# Patient Record
Sex: FEMALE | Race: WHITE | NOT HISPANIC OR LATINO | Employment: FULL TIME | URBAN - METROPOLITAN AREA
[De-identification: names, ages, dates, MRNs, and addresses within clinical notes are randomized per-mention and may not be internally consistent; named-entity substitution may affect disease eponyms.]

---

## 2014-07-11 LAB
HPV ABSTRACT: NORMAL
PAP SMEAR - HIM PATIENT REPORTED: NORMAL

## 2015-07-20 LAB — PAP-ABSTRACT: NORMAL

## 2017-01-04 ENCOUNTER — TRANSFERRED RECORDS (OUTPATIENT)
Dept: HEALTH INFORMATION MANAGEMENT | Facility: CLINIC | Age: 22
End: 2017-01-04

## 2017-01-04 LAB
HEP C HIM: NORMAL
PAP-ABSTRACT: NORMAL

## 2018-01-18 LAB — PHQ9 SCORE: 0

## 2018-01-22 ENCOUNTER — TRANSFERRED RECORDS (OUTPATIENT)
Dept: HEALTH INFORMATION MANAGEMENT | Facility: CLINIC | Age: 23
End: 2018-01-22

## 2019-01-23 ENCOUNTER — OFFICE VISIT (OUTPATIENT)
Dept: OBGYN | Facility: CLINIC | Age: 24
End: 2019-01-23
Payer: COMMERCIAL

## 2019-01-23 VITALS
DIASTOLIC BLOOD PRESSURE: 60 MMHG | WEIGHT: 128 LBS | HEART RATE: 59 BPM | SYSTOLIC BLOOD PRESSURE: 96 MMHG | TEMPERATURE: 98.6 F

## 2019-01-23 DIAGNOSIS — Z00.00 ANNUAL PHYSICAL EXAM: Primary | ICD-10-CM

## 2019-01-23 PROCEDURE — 99385 PREV VISIT NEW AGE 18-39: CPT | Performed by: OBSTETRICS & GYNECOLOGY

## 2019-01-23 PROCEDURE — G0145 SCR C/V CYTO,THINLAYER,RESCR: HCPCS | Performed by: OBSTETRICS & GYNECOLOGY

## 2019-01-23 SDOH — HEALTH STABILITY: MENTAL HEALTH: HOW MANY STANDARD DRINKS CONTAINING ALCOHOL DO YOU HAVE ON A TYPICAL DAY?: 1 OR 2

## 2019-01-23 SDOH — HEALTH STABILITY: MENTAL HEALTH: HOW OFTEN DO YOU HAVE A DRINK CONTAINING ALCOHOL?: MONTHLY OR LESS

## 2019-01-23 SDOH — HEALTH STABILITY: MENTAL HEALTH: HOW OFTEN DO YOU HAVE 6 OR MORE DRINKS ON ONE OCCASION?: NEVER

## 2019-01-23 NOTE — NURSING NOTE
Chief Complaint   Patient presents with     Physical       Initial BP 96/60 (BP Location: Left arm, Patient Position: Sitting, Cuff Size: Adult Regular)   Pulse 59   Temp 98.6  F (37  C) (Oral)   Wt 58.1 kg (128 lb)  There is no height or weight on file to calculate BMI.  BP completed using cuff size: regular    Questioned patient about current smoking habits.  Pt. has never smoked.          The following HM Due: Pap Smear    The following patient reported/Care Every where data was sent to:  P ABSTRACT QUALITY INITIATIVES [08606]    ANJELICA Burnette

## 2019-01-23 NOTE — LETTER
January 31, 2019      Melody Cash  [address], [place] [address]    Dear MsPrabhakarShailesh,      I am happy to inform you that your recent cervical cancer screening test (PAP smear) was normal.      Preventative screenings such as this help to ensure your health for years to come. You should repeat a pap smear in 3 years, unless otherwise directed.      You will still need to return to the clinic every year for your annual exam and other preventive tests.     If you have additional questions regarding this result, please call our registered nurse, Iraida at [phone].      Sincerely,      Aarti Flowers MD/[facility]

## 2019-01-23 NOTE — PROGRESS NOTES
Melody is a 23 year old  female who presents for annual exam.     Menses are irregular paitient have IUD lasting 0 days.  Menses flow: normal and 0.  No LMP recorded.. Using IUD for contraception.  She is not currently considering pregnancy.  Besides routine health maintenance, she has no other health concerns today .  Mirena IUD placed 2015.  Aware it's due to be replaced next year.    Got Gardasil x3  in high school.  GYNECOLOGIC HISTORY:  Menarche: 12  Age at first intercourse: 17 Number of lifetime partners: less than 6  Melody is not sexually active with 0male partner(s) and is not currently in monogamous relationship .    History sexually transmitted infections:No STD history  STI testing offered?  Declined  SIDNEY exposure: No  History of abnormal Pap smear: NO - age 21-29 PAP every 3 years recommended  Family history of breast CA: No  Family history of uterine/ovarian CA: No    Family history of colon CA: Yes (Please explain): Maternal Grandfather    HEALTH MAINTENANCE:  Cholesterol: (No results found for: CHOL History of abnormal lipids: No  Mammo: 0 . History of abnormal Mammo: Not applicable, 0.  Regular Self Breast Exams: Yes  Calcium/Vitamin D intake: source:  dietary supplement(s) Adequate? Yes  TSH: (No results found for: TSH )  Pap; (No results found for: PAP )    HISTORY:  Obstetric History       T0      L0     SAB0   TAB0   Ectopic0   Multiple0   Live Births0         No past medical history on file.  Past Surgical History:   Procedure Laterality Date     widsom teeth       Family History   Problem Relation Age of Onset     Dementia Mother      Hypertension Maternal Grandfather      Hyperlipidemia Maternal Grandfather      Blood Disease Maternal Grandfather      Cancer Maternal Grandfather      Colon Cancer Maternal Grandfather      Cancer Paternal Grandmother      Coronary Artery Disease Other         PGFA     Diabetes Other         PGFA     Social History     Socioeconomic  History     Marital status: Single     Spouse name: None     Number of children: None     Years of education: None     Highest education level: None   Social Needs     Financial resource strain: None     Food insecurity - worry: None     Food insecurity - inability: None     Transportation needs - medical: None     Transportation needs - non-medical: None   Occupational History     None   Tobacco Use     Smoking status: Never Smoker     Smokeless tobacco: Never Used   Substance and Sexual Activity     Alcohol use: Yes     Frequency: Monthly or less     Drinks per session: 1 or 2     Binge frequency: Never     Comment: socially     Drug use: No     Sexual activity: Not Currently     Partners: Male   Other Topics Concern     None   Social History Narrative     None       Current Outpatient Medications:      calcium carbonate-vitamin D (OS-LIN) 500-400 MG-UNIT tablet, Take 1 tablet by mouth daily, Disp: , Rfl:      levonorgestrel (MIRENA) 20 MCG/24HR IUD, 1 each by Intrauterine route once, Disp: , Rfl:    No Known Allergies    Past medical, surgical, social and family history were reviewed and updated in EPIC.    ROS:   C:     NEGATIVE for fever, chills, change in weight  I:       NEGATIVE for worrisome rashes, moles or lesions  E:     NEGATIVE for vision changes or irritation  E/M: NEGATIVE for ear, mouth and throat problems  R:     NEGATIVE for significant cough or SOB  CV:   NEGATIVE for chest pain, palpitations or peripheral edema  GI:     NEGATIVE for nausea, abdominal pain, heartburn, or change in bowel habits  :   NEGATIVE for frequency, dysuria, hematuria, vaginal discharge, or irregular bleeding  M:     NEGATIVE for significant arthralgias or myalgia  N:      NEGATIVE for weakness, dizziness or paresthesias  E:      NEGATIVE for temperature intolerance, skin/hair changes  P:      NEGATIVE for changes in mood or affect.    EXAM:  BP 96/60 (BP Location: Left arm, Patient Position: Sitting, Cuff Size: Adult  Regular)   Pulse 59   Temp 98.6  F (37  C) (Oral)   Wt 58.1 kg (128 lb)    BMI: There is no height or weight on file to calculate BMI.  Constitutional: healthy, alert and no distress  Head: Normocephalic. No masses, lesions, tenderness or abnormalities  Neck: Neck supple. Trachea midline. No adenopathy. Thyroid symmetric, normal size.   Cardiovascular: RRR.   Respiratory: Negative.   Breast: No nodularity, asymmetry or nipple discharge bilaterally.  Gastrointestinal: Abdomen soft, non-tender, non-distended. No masses, organomegaly.  :  Vulva:  No external lesions, normal female hair distribution, no inguinal adenopathy.    Urethra:  Midline, non-tender, well supported, no discharge  Vagina:  Moist, pink, no abnormal discharge, no lesions  Uterus:  Normal size, non-tender, freely mobile  Ovaries:  No masses appreciated, non-tender, mobile  Rectal Exam: deferred  Musculoskeletal: extremities normal  Skin: no suspicious lesions or rashes  Psychiatric: Affect appropriate, cooperative,mentation appears normal.     COUNSELING:   Reviewed preventive health counseling, as reflected in patient instructions       Contraception       Family planning       Safe sex practices/STD prevention   reports that  has never smoked. she has never used smokeless tobacco.    There is no height or weight on file to calculate BMI.   Normal BMI  FRAX Risk Assessment    ASSESSMENT:  23 year old female with satisfactory annual exam  (Z00.00) Annual physical exam  (primary encounter diagnosis)  Comment: normal exam.  Pap updated.  Declines STI screening.    Plan: Pap imaged thin layer screen reflex to HPV if         ASCUS - recommend age 25 - 29            Plan to replace IUD with annual next Jacob.    Aarti Flowers MD

## 2019-01-25 ENCOUNTER — OFFICE VISIT (OUTPATIENT)
Dept: DERMATOLOGY | Facility: CLINIC | Age: 24
End: 2019-01-25
Payer: COMMERCIAL

## 2019-01-25 DIAGNOSIS — D22.9 MULTIPLE BENIGN NEVI: ICD-10-CM

## 2019-01-25 DIAGNOSIS — Z80.8 FAMILY HISTORY OF MELANOMA: ICD-10-CM

## 2019-01-25 DIAGNOSIS — L81.4 SOLAR LENTIGINOSIS: ICD-10-CM

## 2019-01-25 DIAGNOSIS — Z12.83 SKIN CANCER SCREENING: Primary | ICD-10-CM

## 2019-01-25 ASSESSMENT — PAIN SCALES - GENERAL: PAINLEVEL: NO PAIN (0)

## 2019-01-25 NOTE — LETTER
1/25/2019       RE: Melody Cash  [address]  [place] [place] [facility] [address]     Dear Colleague,    Thank you for referring your patient, Melody Cash, to the [facility] DERMATOLOGY at [facility]. Please see a copy of my visit note below.    [facility] Dermatology Note      Dermatology Problem List:  1. Family hx melanoma, 1/25/19   2. Skin cancer screening, 1/25/19    Encounter Date: Jan 25, 2019    CC:  Chief Complaint   Patient presents with     Skin Check     Melody is here today for a skin cancer exam. She does not have any lesions of concern at this time.     History of Present Illness:  Ms. Melody Cash is a 23 year old female who presents today in self referral regarding skin check as a new patient. There is a family history of melanoma in her paternal uncle. She admits to an average amount of sun exposure and regular sun screen use over her lifetime. Denies tanning bed use. She had a skin check about 3 years ago, during which no concerning lesions were noted. Today she is feeling well, with no specific skin concerns. She would like reassurance on her existing moles.     Past Medical History:   There is no problem list on file for this patient.    No past medical history on file.  Past Surgical History:   Procedure Laterality Date     widsom teeth         Social History:  Patient reports that  has never smoked. she has never used smokeless tobacco. She reports that she drinks alcohol. She reports that she does not use drugs.   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     Family History:  Family History   Problem Relation Age of Onset     Dementia Mother      Hypertension Maternal Grandfather      Hyperlipidemia Maternal Grandfather      Blood Disease Maternal Grandfather      Cancer Maternal Grandfather      Colon Cancer Maternal Grandfather      Cancer Paternal Grandmother      Coronary Artery Disease Other         PGFA     Diabetes Other          PGFA       Medications:  Current Outpatient Medications   Medication Sig Dispense Refill     calcium carbonate-vitamin D (OS-LIN) 500-400 MG-UNIT tablet Take 1 tablet by mouth daily       levonorgestrel (MIRENA) 20 MCG/24HR IUD 1 each by Intrauterine route once         No Known Allergies    Review of Systems:  -Skin/Heme New Pt: The patient denies frequent sun exposure. The patient denies excessive scarring or problems healing except as per HPI. The patient denies excessive bleeding. Skin as per HPI. No additional skin concerns.   -Constitutional: Otherwise feeling well today, in usual state of health.    Physical exam:  Vitals: There were no vitals taken for this visit.  GEN: This is a well developed, well-nourished female in no acute distress, in a pleasant mood.    SKIN: Full skin, which includes the head/face, both arms, chest, back, abdomen,both legs, genitalia and/or groin buttocks, digits and/or nails, was examined. Significant for:   -Few regular brown pigmented macules and papules are identified on the trunk and extremities.   -Rare scattered brown macules on sun exposed areas.  -No other lesions of concern on areas examined.       Impression/Plan:  1. Family hx melanoma     Recommend regular skin exams    2. Solar lentigines    Sun precaution was advised including the use of sun screens of SPF 30 or higher, sun protective clothing, and avoidance of tanning beds.    3. Few clinically benign nevi on the trunk and extremities     ABCDs of melanoma were discussed and self skin checks were advised.     Follow-up in 2 years, earlier for new or changing lesions.     Staff Involved:  Scribe/Staff    Scribe Disclosure:   MELANIE, Maria Isabel Jordan, am serving as a scribe to document services personally performed by Wendy Howell PA-C, based on data collection and the provider's statements to me.    Provider Disclosure:   The documentation recorded by the scribe accurately reflects the services I personally performed  and the decisions made by me.    All risks, benefits and alternatives were discussed with patient.  Patient is in agreement and understands the assessment and plan.  All questions were answered.  Sun Screen Education was given.   Return to Clinic in 2 yrs or sooner as needed.   Wendy Howell PA-C   [facility]erm[facility]       Again, thank you for allowing me to participate in the care of your patient.      Sincerely,    Wendy Howell PA-C

## 2019-01-25 NOTE — LETTER
Date:January 28, 2019      Patient was self referred, no letter generated. Do not send.        [facility] Information

## 2019-01-25 NOTE — NURSING NOTE
Chief Complaint   Patient presents with     Skin Check     Melody is here today for a skin cancer exam. She does not have any lesions of concern at this time.     Deisy Mckay CMA

## 2019-01-25 NOTE — PROGRESS NOTES
[facility] Dermatology Note      Dermatology Problem List:  1. Family hx melanoma, 1/25/19   2. Skin cancer screening, 1/25/19    Encounter Date: Jan 25, 2019    CC:  Chief Complaint   Patient presents with     Skin Check     Melody is here today for a skin cancer exam. She does not have any lesions of concern at this time.     History of Present Illness:  Ms. Melody Cash is a 23 year old female who presents today in self referral regarding skin check as a new patient. There is a family history of melanoma in her paternal uncle. She admits to an average amount of sun exposure and regular sun screen use over her lifetime. Denies tanning bed use. She had a skin check about 3 years ago, during which no concerning lesions were noted. Today she is feeling well, with no specific skin concerns. She would like reassurance on her existing moles.     Past Medical History:   There is no problem list on file for this patient.    No past medical history on file.  Past Surgical History:   Procedure Laterality Date     widsom teeth         Social History:  Patient reports that  has never smoked. she has never used smokeless tobacco. She reports that she drinks alcohol. She reports that she does not use drugs.   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     Family History:  Family History   Problem Relation Age of Onset     Dementia Mother      Hypertension Maternal Grandfather      Hyperlipidemia Maternal Grandfather      Blood Disease Maternal Grandfather      Cancer Maternal Grandfather      Colon Cancer Maternal Grandfather      Cancer Paternal Grandmother      Coronary Artery Disease Other         PGFA     Diabetes Other         PGFA       Medications:  Current Outpatient Medications   Medication Sig Dispense Refill     calcium carbonate-vitamin D (OS-LIN) 500-400 MG-UNIT tablet Take 1 tablet by mouth daily       levonorgestrel (MIRENA) 20 MCG/24HR IUD 1 each by Intrauterine route once          No Known Allergies    Review of Systems:  -Skin/Heme New Pt: The patient denies frequent sun exposure. The patient denies excessive scarring or problems healing except as per HPI. The patient denies excessive bleeding. Skin as per HPI. No additional skin concerns.   -Constitutional: Otherwise feeling well today, in usual state of health.    Physical exam:  Vitals: There were no vitals taken for this visit.  GEN: This is a well developed, well-nourished female in no acute distress, in a pleasant mood.    SKIN: Full skin, which includes the head/face, both arms, chest, back, abdomen,both legs, genitalia and/or groin buttocks, digits and/or nails, was examined. Significant for:   -Few regular brown pigmented macules and papules are identified on the trunk and extremities.   -Rare scattered brown macules on sun exposed areas.  -No other lesions of concern on areas examined.       Impression/Plan:  1. Family hx melanoma     Recommend regular skin exams    2. Solar lentigines    Sun precaution was advised including the use of sun screens of SPF 30 or higher, sun protective clothing, and avoidance of tanning beds.    3. Few clinically benign nevi on the trunk and extremities     ABCDs of melanoma were discussed and self skin checks were advised.     Follow-up in 2 years, earlier for new or changing lesions.     Staff Involved:  Scribe/Staff    Scribe Disclosure:   MELANIE, Maria Isabel Jordan, am serving as a scribe to document services personally performed by Wendy Howell PA-C, based on data collection and the provider's statements to me.    Provider Disclosure:   The documentation recorded by the scribe accurately reflects the services I personally performed and the decisions made by me.    All risks, benefits and alternatives were discussed with patient.  Patient is in agreement and understands the assessment and plan.  All questions were answered.  Sun Screen Education was given.   Return to Clinic in 2 yrs or sooner as  needed.   Wendy Howell PA-C   [facility] Dermat[facility]

## 2019-01-28 LAB
COPATH REPORT: NORMAL
PAP: NORMAL

## 2019-03-28 ENCOUNTER — DOCUMENTATION ONLY (OUTPATIENT)
Dept: CARE COORDINATION | Facility: CLINIC | Age: 24
End: 2019-03-28

## 2019-04-10 ENCOUNTER — OFFICE VISIT (OUTPATIENT)
Dept: DERMATOLOGY | Facility: CLINIC | Age: 24
End: 2019-04-10
Payer: COMMERCIAL

## 2019-04-10 DIAGNOSIS — D48.5 NEOPLASM OF UNCERTAIN BEHAVIOR OF SKIN: Primary | ICD-10-CM

## 2019-04-10 DIAGNOSIS — Z80.8 FAMILY HISTORY OF MELANOMA: ICD-10-CM

## 2019-04-10 ASSESSMENT — PAIN SCALES - GENERAL: PAINLEVEL: NO PAIN (0)

## 2019-04-10 NOTE — LETTER
4/10/2019       RE: Melody Cash  [address]  [place] [facility] [address]     Dear Colleague,    Thank you for referring your patient, Melody Cash, to the [facility] DERMATOLOGY at [facility]. Please see a copy of my visit note below.    [facility] Dermatology Note      Dermatology Problem List:  0. NUB, left helix- s/p bx 4/10/19  1. Family hx melanoma  2. Skin cancer screening, 1/25/19    Encounter Date: Apr 10, 2019    CC:  Chief Complaint   Patient presents with     Derm Problem     Spot check, Melody has one lesion on her ear she would like checked.      History of Present Illness:  Ms. Melody Cash is a 23 year old female, with a FMH of melanoma, who presents today for a skin check. The patient was last seen in the dermatology clinic on 01/25/19 during which her total body skin exam was unremarkable.    Today she reports a mole of concern behind her left ear. This lesion was previously noted to her by another provider. This mole has a more irregular shape than her other moles. She would like reassurance on this spot, due to her family history of melanoma.     Otherwise she is feeling well, without additional skin concerns.     Past Medical History:   There is no problem list on file for this patient.    No past medical history on file.  Past Surgical History:   Procedure Laterality Date     widsom teeth         Social History:  Patient reports that she has never smoked. She has never used smokeless tobacco. She reports that she drinks alcohol. She reports that she does not use drugs.   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     Family History:  Family History   Problem Relation Age of Onset     Dementia Mother      Hypertension Maternal Grandfather      Hyperlipidemia Maternal Grandfather      Blood Disease Maternal Grandfather      Cancer Maternal Grandfather      Colon Cancer Maternal Grandfather      Cancer Paternal Grandmother       Coronary Artery Disease Other         PGFA     Diabetes Other         PGFA       Medications:  Current Outpatient Medications   Medication Sig Dispense Refill     calcium carbonate-vitamin D (OS-LIN) 500-400 MG-UNIT tablet Take 1 tablet by mouth daily       Fe Bisgly-Succ-C-Thre-B12-FA (IRON-150 PO)        levonorgestrel (MIRENA) 20 MCG/24HR IUD 1 each by Intrauterine route once         No Known Allergies    Review of Systems:  -Skin/Heme New Pt: The patient denies frequent sun exposure. The patient denies excessive scarring or problems healing except as per HPI. The patient denies excessive bleeding. Skin as per HPI. No additional skin concerns.   -Constitutional: Otherwise feeling well today, in usual state of health.    Physical exam:  Vitals: There were no vitals taken for this visit.  GEN: This is a well developed, well-nourished female in no acute distress, in a pleasant mood.    SKIN: A focused examination of the face, neck bilateral ears was performed. Significant for:   - There is a  3 mm variegated slightly macule on the left helix.   -No other lesions of concern on areas examined.       Impression/Plan:  1. Family hx melanoma     Recommend regular skin exams    Pt to monitor existing lesions and report any changes.     2. Neoplasm of uncertain behavior to the left helix. Differential diagnosis to r/o dyplasia   Shave biopsy: After discussion of benefits and risks including but not limited to bleeding, infection, scar, incomplete removal, recurrence, and non-diagnostic biopsy, written consent and photographs were obtained. The area was cleaned with isopropyl alcohol. 0.4 mL of 1% lidocaine with epinephrine was injected to obtain adequate anesthesia of the lesion on the left helix. A shave biopsy was performed. Hemostasis was achieved with aluminium chloride. Vaseline and a sterile dressing were applied. The patient tolerated the procedure and no complications were noted. The patient was provided with  verbal and written post care instructions.     Follow-up in 1 year, earlier pending biopsy results or for new or changing lesions.     Staff Involved:  Scribe/Staff    Scribe Disclosure:   I, Maria Isabel Jordan, am serving as a scribe to document services personally performed by Wendy Howell PA-C, based on data collection and the provider's statements to me.    Provider Disclosure:   The documentation recorded by the scribe accurately reflects the services I personally performed and the decisions made by me.    All risks, benefits and alternatives were discussed with patient.  Patient is in agreement and understands the assessment and plan.  All questions were answered.  Sun Screen Education was given.   Return to Clinic in 12 months or sooner as needed.   Wendy Howell PA-C   [facility] Dermatology [facility]       Again, thank you for allowing me to participate in the care of your patient.      Sincerely,    Wendy Howell PA-C

## 2019-04-10 NOTE — PROGRESS NOTES
[facility] Dermatology Note      Dermatology Problem List:  0. NUB, left helix- s/p bx 4/10/19  1. Family hx melanoma  2. Skin cancer screening, 1/25/19    Encounter Date: Apr 10, 2019    CC:  Chief Complaint   Patient presents with     Derm Problem     Spot check, Melody has one lesion on her ear she would like checked.      History of Present Illness:  Ms. Melody Cash is a 23 year old female, with a H of melanoma, who presents today for a skin check. The patient was last seen in the dermatology clinic on 01/25/19 during which her total body skin exam was unremarkable.    Today she reports a mole of concern behind her left ear. This lesion was previously noted to her by another provider. This mole has a more irregular shape than her other moles. She would like reassurance on this spot, due to her family history of melanoma.     Otherwise she is feeling well, without additional skin concerns.     Past Medical History:   There is no problem list on file for this patient.    No past medical history on file.  Past Surgical History:   Procedure Laterality Date     widsom teeth         Social History:  Patient reports that she has never smoked. She has never used smokeless tobacco. She reports that she drinks alcohol. She reports that she does not use drugs.   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     Family History:  Family History   Problem Relation Age of Onset     Dementia Mother      Hypertension Maternal Grandfather      Hyperlipidemia Maternal Grandfather      Blood Disease Maternal Grandfather      Cancer Maternal Grandfather      Colon Cancer Maternal Grandfather      Cancer Paternal Grandmother      Coronary Artery Disease Other         PGFA     Diabetes Other         PGFA       Medications:  Current Outpatient Medications   Medication Sig Dispense Refill     calcium carbonate-vitamin D (OS-LIN) 500-400 MG-UNIT tablet Take 1 tablet by mouth daily       Fe  Bisgly-Succ-C-Thre-B12-FA (IRON-150 PO)        levonorgestrel (MIRENA) 20 MCG/24HR IUD 1 each by Intrauterine route once         No Known Allergies    Review of Systems:  -Skin/Heme New Pt: The patient denies frequent sun exposure. The patient denies excessive scarring or problems healing except as per HPI. The patient denies excessive bleeding. Skin as per HPI. No additional skin concerns.   -Constitutional: Otherwise feeling well today, in usual state of health.    Physical exam:  Vitals: There were no vitals taken for this visit.  GEN: This is a well developed, well-nourished female in no acute distress, in a pleasant mood.    SKIN: A focused examination of the face, neck bilateral ears was performed. Significant for:   - There is a  3 mm variegated slightly macule on the left helix.   -No other lesions of concern on areas examined.       Impression/Plan:  1. Family hx melanoma     Recommend regular skin exams    Pt to monitor existing lesions and report any changes.     2. Neoplasm of uncertain behavior to the left helix. Differential diagnosis to r/o dyplasia   Shave biopsy: After discussion of benefits and risks including but not limited to bleeding, infection, scar, incomplete removal, recurrence, and non-diagnostic biopsy, written consent and photographs were obtained. The area was cleaned with isopropyl alcohol. 0.4 mL of 1% lidocaine with epinephrine was injected to obtain adequate anesthesia of the lesion on the left helix. A shave biopsy was performed. Hemostasis was achieved with aluminium chloride. Vaseline and a sterile dressing were applied. The patient tolerated the procedure and no complications were noted. The patient was provided with verbal and written post care instructions.     Follow-up in 1 year, earlier pending biopsy results or for new or changing lesions.     Staff Involved:  Scribe/Staff    Scribe Disclosure:   Maria Isabel NAVARRO, am serving as a scribe to document services personally  performed by Wendy Howell PA-C, based on data collection and the provider's statements to me.    Provider Disclosure:   The documentation recorded by the scribe accurately reflects the services I personally performed and the decisions made by me.    All risks, benefits and alternatives were discussed with patient.  Patient is in agreement and understands the assessment and plan.  All questions were answered.  Sun Screen Education was given.   Return to Clinic in 12 months or sooner as needed.   Wendy Howell PA-C   [facility]ermat[facility]

## 2019-04-10 NOTE — NURSING NOTE
Dermatology Rooming Note    Melody Cash's goals for this visit include:   Chief Complaint   Patient presents with     Derm Problem     Spot check, Melody has one lesion on her ear she would like checked.      Noemi Odonnell LPN

## 2019-04-10 NOTE — PATIENT INSTRUCTIONS

## 2019-04-10 NOTE — NURSING NOTE
Lidocaine-epinephrine 1-1:605613 % injection   0.4mL once for one use, starting 4/10/2019 ending 4/10/2019,  2mL disp, R-0, injection  Injected by Elizabeth Padilla

## 2019-04-10 NOTE — LETTER
Date:April 11, 2019      Patient was self referred, no letter generated. Do not send.        [facility] Health Information

## 2019-04-12 LAB — COPATH REPORT: NORMAL

## 2019-09-28 ENCOUNTER — HEALTH MAINTENANCE LETTER (OUTPATIENT)
Age: 24
End: 2019-09-28

## 2020-01-06 ENCOUNTER — OFFICE VISIT (OUTPATIENT)
Dept: DERMATOLOGY | Facility: CLINIC | Age: 25
End: 2020-01-06
Payer: COMMERCIAL

## 2020-01-06 DIAGNOSIS — Z80.8 FAMILY HISTORY OF MELANOMA: ICD-10-CM

## 2020-01-06 DIAGNOSIS — Z12.83 SKIN CANCER SCREENING: Primary | ICD-10-CM

## 2020-01-06 DIAGNOSIS — D22.9 MULTIPLE BENIGN NEVI: ICD-10-CM

## 2020-01-06 ASSESSMENT — PAIN SCALES - GENERAL: PAINLEVEL: NO PAIN (0)

## 2020-01-06 NOTE — LETTER
1/6/2020       RE: Melody Cash  [address]  [place] [facility] [address]     Dear Colleague,    Thank you for referring your patient, Melody Cash, to the [facility] DERMATOLOGY at [facility]. Please see a copy of my visit note below.    [facility] Dermatology Note      Dermatology Problem List:  1. Family hx melanoma, paternal uncle  2. Compound melanocytic nevus, left helix s/p Bx 4/10/19  3. Skin cancer screening, 1/6/2020, unremarkable    Encounter Date: Jan 6, 2020    CC:  Chief Complaint   Patient presents with     Skin Check     Melody is here for a skin check. No concerns.      History of Present Illness:  Ms. Melody Cash is a 24 year old female, with a family history of melanoma who presents today for a skin cancer screening. She was last seen in the dermatology clinic on 4/10/19 for a spot check when she had a neoplasm of uncertain behavior biopsied on the left helix which returned as a compound melanocytic nevus.    Today she reports that she has no particular lesions of concern today and that her left helix healed well from the biopsy in April 2019. Denies changing, bleeding, or non-healing lesions anywhere on the body.    Otherwise she is feeling well, without additional skin concerns at this time.    Past Medical History:   There is no problem list on file for this patient.    No past medical history on file.  Past Surgical History:   Procedure Laterality Date     widsom teeth         Social History:  Patient reports that she has never smoked. She has never used smokeless tobacco. She reports current alcohol use. She reports that she does not use drugs.   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     Family History:  Family History   Problem Relation Age of Onset     Dementia Mother      Hypertension Maternal Grandfather      Hyperlipidemia Maternal Grandfather      Blood Disease Maternal Grandfather      Cancer Maternal  Grandfather      Colon Cancer Maternal Grandfather      Cancer Paternal Grandmother      Coronary Artery Disease Other         PGFA     Diabetes Other         PGFA       Medications:  Current Outpatient Medications   Medication Sig Dispense Refill     calcium carbonate-vitamin D (OS-LIN) 500-400 MG-UNIT tablet Take 1 tablet by mouth daily       Fe Bisgly-Succ-C-Thre-B12-FA (IRON-150 PO)        levonorgestrel (MIRENA) 20 MCG/24HR IUD 1 each by Intrauterine route once         No Known Allergies    Review of Systems:  -Skin/Heme New Pt: The patient denies frequent sun exposure. The patient denies excessive scarring or problems healing except as per HPI. The patient denies excessive bleeding. Skin as per HPI. No additional skin concerns.   -Constitutional: Otherwise feeling well today, in usual state of health.    Physical exam:  Vitals: There were no vitals taken for this visit.  GEN: This is a well developed, well-nourished female in no acute distress, in a pleasant mood.    SKIN: Full skin, which includes the head/face, both arms, chest, back, abdomen,both legs, genitalia and/or groin buttocks, digits and/or nails, was examined.    - Multiple regular brown pigmented macules and papules are identified on the face, trunk, and extremities.   - No other lesions of concern on areas examined.       Impression/Plan:  1. Family hx melanoma (paternal uncle)    Recommend regular skin exams     Pt to monitor existing lesions and report any changes.     2. Multiple clinically benign nevi, face, trunk, and extremities    ABCD's of melanoma were reviewed with patient and handout provided.     Recommend sunscreens SPF #30 or greater, protective clothing and avoidance of tanning beds.    Benign nature reassured, no further intervention required at this time.     Follow-up in 2 years, earlier for new or changing lesions.    Staff Involved:  Scribe/Staff    Scribe Disclosure:   IJose, am serving as a scribe to document  services personally performed by Wendy Howell PA-C, based on data collection and the provider's statements to me.    Provider Disclosure:   The documentation recorded by the scribe accurately reflects the services I personally performed and the decisions made by me.    All risks, benefits and alternatives were discussed with patient.  Patient is in agreement and understands the assessment and plan.  All questions were answered.  Sun Screen Education was given.   Return to Clinic in 1-2 yrs or sooner as needed.   Wendy Howell PA-C   [facility]

## 2020-01-06 NOTE — NURSING NOTE
Chief Complaint   Patient presents with     Skin Check     Melody is here for a skin check. No concerns.      Tiffany Unger LPN

## 2020-01-06 NOTE — PROGRESS NOTES
[facility] Dermatology Note      Dermatology Problem List:  1. Family hx melanoma, paternal uncle  2. Compound melanocytic nevus, left helix s/p Bx 4/10/19  3. Skin cancer screening, 1/6/2020, unremarkable    Encounter Date: Jan 6, 2020    CC:  Chief Complaint   Patient presents with     Skin Check     Melody is here for a skin check. No concerns.      History of Present Illness:  Ms. Melody Cash is a 24 year old female, with a family history of melanoma who presents today for a skin cancer screening. She was last seen in the dermatology clinic on 4/10/19 for a spot check when she had a neoplasm of uncertain behavior biopsied on the left helix which returned as a compound melanocytic nevus.    Today she reports that she has no particular lesions of concern today and that her left helix healed well from the biopsy in April 2019. Denies changing, bleeding, or non-healing lesions anywhere on the body.    Otherwise she is feeling well, without additional skin concerns at this time.    Past Medical History:   There is no problem list on file for this patient.    No past medical history on file.  Past Surgical History:   Procedure Laterality Date     widsom teeth         Social History:  Patient reports that she has never smoked. She has never used smokeless tobacco. She reports current alcohol use. She reports that she does not use drugs.   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     Family History:  Family History   Problem Relation Age of Onset     Dementia Mother      Hypertension Maternal Grandfather      Hyperlipidemia Maternal Grandfather      Blood Disease Maternal Grandfather      Cancer Maternal Grandfather      Colon Cancer Maternal Grandfather      Cancer Paternal Grandmother      Coronary Artery Disease Other         PGFA     Diabetes Other         PGFA       Medications:  Current Outpatient Medications   Medication Sig Dispense Refill     calcium carbonate-vitamin  D (OS-LIN) 500-400 MG-UNIT tablet Take 1 tablet by mouth daily       Fe Bisgly-Succ-C-Thre-B12-FA (IRON-150 PO)        levonorgestrel (MIRENA) 20 MCG/24HR IUD 1 each by Intrauterine route once         No Known Allergies    Review of Systems:  -Skin/Heme New Pt: The patient denies frequent sun exposure. The patient denies excessive scarring or problems healing except as per HPI. The patient denies excessive bleeding. Skin as per HPI. No additional skin concerns.   -Constitutional: Otherwise feeling well today, in usual state of health.    Physical exam:  Vitals: There were no vitals taken for this visit.  GEN: This is a well developed, well-nourished female in no acute distress, in a pleasant mood.    SKIN: Full skin, which includes the head/face, both arms, chest, back, abdomen,both legs, genitalia and/or groin buttocks, digits and/or nails, was examined.    - Multiple regular brown pigmented macules and papules are identified on the face, trunk, and extremities.   - No other lesions of concern on areas examined.       Impression/Plan:  1. Family hx melanoma (paternal uncle)    Recommend regular skin exams     Pt to monitor existing lesions and report any changes.     2. Multiple clinically benign nevi, face, trunk, and extremities    ABCD's of melanoma were reviewed with patient and handout provided.     Recommend sunscreens SPF #30 or greater, protective clothing and avoidance of tanning beds.    Benign nature reassured, no further intervention required at this time.     Follow-up in 2 years, earlier for new or changing lesions.    Staff Involved:  Scribe/Staff    Scribe Disclosure:   I, Jose Ruano, am serving as a scribe to document services personally performed by Wendy Howell PA-C, based on data collection and the provider's statements to me.    Provider Disclosure:   The documentation recorded by the scribe accurately reflects the services I personally performed and the decisions made by me.    All  risks, benefits and alternatives were discussed with patient.  Patient is in agreement and understands the assessment and plan.  All questions were answered.  Sun Screen Education was given.   Return to Clinic in 1-2 yrs or sooner as needed.   Wendy Howell PA-C   [facility]

## 2020-01-09 ENCOUNTER — OFFICE VISIT (OUTPATIENT)
Dept: OBGYN | Facility: CLINIC | Age: 25
End: 2020-01-09
Payer: COMMERCIAL

## 2020-01-09 VITALS
DIASTOLIC BLOOD PRESSURE: 61 MMHG | WEIGHT: 125 LBS | HEART RATE: 65 BPM | SYSTOLIC BLOOD PRESSURE: 98 MMHG | TEMPERATURE: 97 F

## 2020-01-09 DIAGNOSIS — Z00.00 ANNUAL PHYSICAL EXAM: Primary | ICD-10-CM

## 2020-01-09 DIAGNOSIS — Z30.433 ENCOUNTER FOR REMOVAL AND REINSERTION OF IUD: ICD-10-CM

## 2020-01-09 PROCEDURE — 58301 REMOVE INTRAUTERINE DEVICE: CPT | Performed by: OBSTETRICS & GYNECOLOGY

## 2020-01-09 PROCEDURE — 99395 PREV VISIT EST AGE 18-39: CPT | Mod: 25 | Performed by: OBSTETRICS & GYNECOLOGY

## 2020-01-09 PROCEDURE — 58300 INSERT INTRAUTERINE DEVICE: CPT | Performed by: OBSTETRICS & GYNECOLOGY

## 2020-01-09 NOTE — NURSING NOTE
No chief complaint on file.  IUD Removal and Replacment Mirena NDC:[id number] LOT:[id number]  EXP:2022    Initial BP 98/61 (BP Location: Left arm, Patient Position: Sitting, Cuff Size: Adult Regular)   Pulse 65   Temp 97  F (36.1  C) (Oral)   Wt 56.7 kg (125 lb)  There is no height or weight on file to calculate BMI.  BP completed using cuff size: regular    Questioned patient about current smoking habits.  Pt. has never smoked.          The following HM Due: NONE      The following patient reported/Care Every where data was sent to:  P ABSTRACT QUALITY INITIATIVES [[id number]]  ANJELICA Burnette MA

## 2020-01-09 NOTE — PROGRESS NOTES
Melody is a 24 year old  female who presents for annual exam.     Menses are irregular and normal lasting 4-5 days.  Menses flow: normal.  No LMP recorded.. Using IUD for contraception.  She is not currently considering pregnancy.  Besides routine health maintenance, she has no other health concerns today .  Doing well in the last year.  Work is going well, busy. Happy with IUD.  Would like Mirena again.  GYNECOLOGIC HISTORY:  Menarche: 11  Age at first intercourse: 18 Number of lifetime partners: less than 6  Melody is sexually active with 1male partner(s) and is currently in monogamous relationship with starla.    History sexually transmitted infections:No STD history  STI testing offered?  Declined  SIDNEY exposure: No  History of abnormal Pap smear: NO - age 21-29 PAP every 3 years recommended  Family history of breast CA: No  Family history of uterine/ovarian CA: No    Family history of colon CA: Yes (Please explain): maternal grandfather    HEALTH MAINTENANCE:  Cholesterol: (No results found for: CHOL History of abnormal lipids: No  Mammo: 0 . History of abnormal Mammo: Not applicable.  Regular Self Breast Exams: Yes  Calcium/Vitamin D intake: source:  dairy, dietary supplement(s) Adequate? Yes  TSH: (No results found for: TSH )  Pap; (  Lab Results   Component Value Date    PAP NIL 2019    )    HISTORY:  OB History    Para Term  AB Living   0 0 0 0 0 0   SAB TAB Ectopic Multiple Live Births   0 0 0 0 0     No past medical history on file.  Past Surgical History:   Procedure Laterality Date     widsom teeth       Family History   Problem Relation Age of Onset     Dementia Mother      Hypertension Maternal Grandfather      Hyperlipidemia Maternal Grandfather      Blood Disease Maternal Grandfather      Cancer Maternal Grandfather      Colon Cancer Maternal Grandfather      Cancer Paternal Grandmother      Coronary Artery Disease Other         PGFA     Diabetes Other         PGFA      Social History     Socioeconomic History     Marital status: Single     Spouse name: None     Number of children: None     Years of education: None     Highest education level: None   Occupational History     None   Social Needs     Financial resource strain: None     Food insecurity:     Worry: None     Inability: None     Transportation needs:     Medical: None     Non-medical: None   Tobacco Use     Smoking status: Never Smoker     Smokeless tobacco: Never Used   Substance and Sexual Activity     Alcohol use: Yes     Frequency: Monthly or less     Drinks per session: 1 or 2     Binge frequency: Never     Comment: socially     Drug use: No     Sexual activity: Not Currently     Partners: Male   Lifestyle     Physical activity:     Days per week: None     Minutes per session: None     Stress: None   Relationships     Social connections:     Talks on phone: None     Gets together: None     Attends Gnosticism service: None     Active member of club or organization: None     Attends meetings of clubs or organizations: None     Relationship status: None     Intimate partner violence:     Fear of current or ex partner: None     Emotionally abused: None     Physically abused: None     Forced sexual activity: None   Other Topics Concern     None   Social History Narrative     None       Current Outpatient Medications:      calcium carbonate-vitamin D (OS-LIN) 500-400 MG-UNIT tablet, Take 1 tablet by mouth daily, Disp: , Rfl:      Fe Bisgly-Succ-C-Thre-B12-FA (IRON-150 PO), , Disp: , Rfl:      levonorgestrel (MIRENA) 20 MCG/24HR IUD, 1 each by Intrauterine route once, Disp: , Rfl:    No Known Allergies    Past medical, surgical, social and family history were reviewed and updated in EPIC.    ROS:   C:     NEGATIVE for fever, chills, change in weight  I:       NEGATIVE for worrisome rashes, moles or lesions  E:     NEGATIVE for vision changes or irritation  E/M: NEGATIVE for ear, mouth and throat problems  R:      NEGATIVE for significant cough or SOB  CV:   NEGATIVE for chest pain, palpitations or peripheral edema  GI:     NEGATIVE for nausea, abdominal pain, heartburn, or change in bowel habits  :   NEGATIVE for frequency, dysuria, hematuria, vaginal discharge, or irregular bleeding  M:     NEGATIVE for significant arthralgias or myalgia  N:      NEGATIVE for weakness, dizziness or paresthesias  E:      NEGATIVE for temperature intolerance, skin/hair changes  P:      NEGATIVE for changes in mood or affect.    EXAM:  BP 98/61 (BP Location: Left arm, Patient Position: Sitting, Cuff Size: Adult Regular)   Pulse 65   Temp 97  F (36.1  C) (Oral)   Wt 56.7 kg (125 lb)    BMI: There is no height or weight on file to calculate BMI.  Constitutional: healthy, alert and no distress  Head: Normocephalic. No masses, lesions  Cardiovascular: normal pulses  Respiratory: Negative.   Gastrointestinal: Abdomen soft, non-tender, non-distended. No masses, organomegaly.  :  Vulva:  No external lesions, normal female hair distribution, no inguinal adenopathy.    Urethra:  Midline, non-tender, well supported, no discharge  Vagina:  Moist, pink, no abnormal discharge, no lesions  Uterus:  Normal size  Rectal Exam: deferred  Musculoskeletal: extremities normal  Skin: no suspicious lesions or rashes  Psychiatric: Affect appropriate, cooperative,mentation appears normal.     COUNSELING:   Reviewed preventive health counseling, as reflected in patient instructions       Contraception       Family planning   reports that she has never smoked. She has never used smokeless tobacco.    There is no height or weight on file to calculate BMI.    FRAX Risk Assessment    ASSESSMENT:  24 year old female with satisfactory annual exam  (Z00.00) Annual physical exam  (primary encounter diagnosis)  Comment: Normal exam today.    (Z30.433) Encounter for removal and reinsertion of IUD  Comment: Uncomplicated IUD removal and replacement.  Plan: INSERTION  INTRAUTERINE DEVICE, REMOVE         INTRAUTERINE DEVICE, HC LEVONORGESTREL IU 52MG         5 YR, levonorgestrel (MIRENA, 52 MG,) 20         MCG/24HR IUD, levonorgestrel (MIRENA) 20         MCG/24HR IUD 20 mcg          Aarti Flowers MD

## 2020-01-09 NOTE — PROGRESS NOTES
PROCEDURE: IUD removal and replacement.    Patient has verbalized understanding of risks and benefits. She was counseled on risks of infection, bleeding, uterine perforation, cervical laceration, expulsion, overall risk of pregnancy 2 in 1000, if she does get pregnant that there is an increased risk of ectopic pregnancy. All questions answered. She has signed the consent form.          A regular Susan speculum was placed in the vagina with good visualization of the cervix.  The IUD strings were visualized at cervical os.  IUD strings grasped with sterile ring forceps. Gentle traction applied and IUD removed intact without difficulty. The cervix was then swabbed with a betadine x3.  Tenaculum was placed at the 12 o'clock position on the cervix and the uterus sounded to 7cm.  The Mirena  IUD was then placed in the usual fashion under sterile technique without difficulty.  Strings were clipped about 2-3 cm from the cervical os.  Tenaculum was removed and cervix was hemostatic. There were no complications. The patient tolerated the procedure well.    Bleeding pattern of this particular IUD was discussed with the patient. She is aware that the IUD will need to be removed in 5 years or PRN.  She is to return to clinic for her next annual or PRN.        Aarti Flowers MD

## 2020-10-16 ENCOUNTER — VIRTUAL VISIT (OUTPATIENT)
Dept: FAMILY MEDICINE | Facility: OTHER | Age: 25
End: 2020-10-16
Payer: COMMERCIAL

## 2020-10-16 PROCEDURE — 99421 OL DIG E/M SVC 5-10 MIN: CPT | Performed by: PHYSICIAN ASSISTANT

## 2020-10-16 NOTE — PROGRESS NOTES
"Date: 10/16/2020 17:16:33  Clinician: Maria T Wilson  Clinician NPI: [id number]  Patient: Melody Cash  Patient : 1995  Patient Address: [address] [place] [address]  Patient Phone: [phone]  Visit Protocol: URI  Patient Summary:  Melody is a 25 year old ( : 1995 ) female who initiated a [facility] Visit for COVID-19 (Coronavirus) evaluation and screening. When asked the question \"Please sign me up to receive news, health information and promotions from [facility].\", Melody responded \"No\".    When asked when her symptoms started, Melody reported that she does not have any symptoms.   She denies taking antibiotic medication in the past month and having recent facial or sinus surgery in the past 60 days.    Pertinent COVID-19 (Coronavirus) information  In the past 14 days, Melody has not worked in a congregate living setting.   She either works or volunteers as a healthcare worker or a , or works or volunteers in a healthcare facility. She provides direct patient care. 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 She lives with a healthcare worker.   Melody has had a close contact with a laboratory-confirmed COVID-19 patient in the last 14 days. She was exposed at her work. Additional information about contact with COVID-19 (Coronavirus) patient as reported by the patient (free text): Provided care for a patient 10/13-10/15 whose caregiver was present in the room at all times, not always wearing a mask, who then started showing symptoms and tested positive for coronavirus    Patient reported they are not living in the same household with a COVID-19 positive patient.  Patient was in an enclosed space for greater than 15 minutes with a COVID-19 patient.  Since 2019, Melody and has not had upper respiratory " infection or influenza-like illness. Has not been diagnosed with lab-confirmed COVID-19 test   Pertinent medical history  Melody does not get yeast infections when she takes antibiotics.   Melody needs a return to work/school note.   Weight: 125 lbs   Melody does not smoke or use smokeless tobacco.   She denies pregnancy and denies breastfeeding. She does not menstruate.   Additional information as reported by the patient (free text): History of celiac disease   Weight: 125 lbs    MEDICATIONS: No current medications, ALLERGIES: NKDA  Clinician Response:  Dear Melody,   Based on your exposure to COVID-19 (coronavirus), we would like to test you for this virus.  1. Please call [phone] to schedule your visit. Explain that you were referred by [facility] to have a COVID-19 test. Be ready to share your [facility] visit ID number.  The following will serve as your written order for this COVID Test, ordered by me, for the indication of suspected COVID [Z20.828]: The test will be ordered in [employer], our electronic health record, after you are scheduled. It will show as ordered and authorized by Miki Barry MD.  Order: COVID-19 (coronavirus) PCR for ASYMPTOMATIC EXPOSURE testing from [facility].  If you know you have had close contact with someone who tested positive, you should be quarantined for 14 days after this exposure. You should stay in quarantine for the14 days even if the covid test is negative, the optimal time to test after exposure is 5-7 days from the exposure  Quarantine means   What should I do?  For safety, it's very important to follow these rules. Do this for 14 days after the date you were last exposed to the virus..  Stay home and away from others. Don't go to school or anywhere else. Generally quarantine means staying home from work but there are some exceptions to this. Please contact your workplace.   No hugging, kissing or shaking hands.  Don't let anyone visit.  Cover your mouth and nose with a mask, tissue or  washcloth to avoid spreading germs.  Wash your hands and face often. Use soap and water.  What are the symptoms of COVID-19?  The most common symptoms are cough, fever and trouble breathing. Less common symptoms include headache, body aches, fatigue (feeling very tired), chills, sore throat, stuffy or runny nose, diarrhea (loose poop), loss of taste or smell, belly pain, and nausea or vomiting (feeling sick to your stomach or throwing up).  After 14 days, if you have still don't have symptoms, you likely don't have this virus.  If you develop symptoms, follow these guidelines.  If you're normally healthy: Please start another [facility] visit to report your symptoms. Go to [email or web address].  If you have a serious health problem (like cancer, heart failure, an organ transplant or kidney disease): Call your specialty clinic. Let them know that you might have COVID-19.  2. When it's time for your COVID test:  Stay at least 6 feet away from others. (If someone will drive you to your test, stay in the backseat, as far away from the  as you can.)  Cover your mouth and nose with a mask, tissue or washcloth.  Go straight to the testing site. Don't make any stops on the way there or back.  Please note  Caregivers in these groups are at risk for severe illness due to COVID-19:  o People 65 years and older  o People who live in a nursing home or long-term care facility  o People with chronic disease (lung, heart, cancer, diabetes, kidney, liver, immunologic)  o People who have a weakened immune system, including those who:  Are in cancer treatment  Take medicine that weakens the immune system, such as corticosteroids  Had a bone marrow or organ transplant  Have an immune deficiency  Have poorly controlled HIV or AIDS  Are obese (body mass index of 40 or higher)  Smoke regularly  Where can I get more information?   [facility] -- About COVID-19: [email or web address]  [facility] -- What to Do If You're Sick:  www.cdc.gov/coronavirus/2019-ncov/about/steps-when-sick.html  CDC -- Ending Home Isolation: www.cdc.gov/coronavirus/2019-ncov/hcp/disposition-in-home-patients.html  [facility] -- Caring for Someone: www.cdc.gov/coronavirus/2019-ncov/if-you-are-sick/care-for-someone.html  [facility] -- Interim Guidance for Hospital Discharge to Home: www.[facility] access hospital.mn./diseases/coronavirus/hcp/hospdischarge.pdf  [facility] clinical trials (COVID-19 research studies): clinicalaffairs.[facility]-clinical-trials  Below are the COVID-19 hotlines at the [place] Department of Health ([facility]). Interpreters are available.  For health questions: Call 897-670-7898 or 1-252.283.8265 (7 a.m. to 7 p.m.)  For questions about schools and childcare: Call 546-047-9607 or 1-508.131.5088 (7 a.m. to 7 p.m.)    Diagnosis: Contact with and (suspected) exposure to other viral communicable diseases  Diagnosis ICD: Z20.828

## 2020-11-21 ENCOUNTER — E-VISIT (OUTPATIENT)
Dept: URGENT CARE | Facility: URGENT CARE | Age: 25
End: 2020-11-21
Payer: COMMERCIAL

## 2020-11-21 DIAGNOSIS — Z20.822 CLOSE EXPOSURE TO 2019 NOVEL CORONAVIRUS: Primary | ICD-10-CM

## 2020-11-21 PROCEDURE — 99421 OL DIG E/M SVC 5-10 MIN: CPT | Performed by: PHYSICIAN ASSISTANT

## 2020-11-21 NOTE — PATIENT INSTRUCTIONS
Dear Melody Cash,    Based on your exposure to COVID-19 (coronavirus), we would like to test you for this virus. I have placed an order for this test.    For all employees or close contacts (except [employer] and [employer] - see below), go to your [employer] home page and scroll down to the section that says  You have an appointment that needs to be scheduled  and click the large green button that says  Schedule Now  and follow the steps to find the next available opening.     If you are unable to complete these steps or if you cannot find any available times, please call [phone] to schedule employee testing.       [employer] employees or close contacts, please call [phone].   [employer] ([employer]) employees or close contacts call [phone].      If you know you have had close contact with someone who tested positive, you should be quarantined for 14 days after this exposure. You should stay in quarantine for the14 days even if the covid test is negative.     Quarantine means:  Stay home and away from others. Don't go to school or anywhere else. Generally quarantine means staying home from work but there are some exceptions to this. Please contact your workplace.  No hugging, kissing or shaking hands.  Don't let anyone visit.  Cover your mouth and nose with a mask, tissue or washcloth to avoid spreading germs.  Wash your hands and face often. Use soap and water.    What are the symptoms of COVID-19?  The most common symptoms are cough, fever and trouble breathing. Less common symptoms include headache, body aches, fatigue (feeling very tired), chills, sore throat, stuffy or runny nose, diarrhea (loose poop), loss of taste or smell, belly pain, and nausea or vomiting (feeling sick to your stomach or throwing up).  After 14 days, if you have still don't have symptoms, you likely don't have this virus.  If you develop symptoms, follow these guidelines.  If you're normally healthy: Please start another eVisit.  If  you have a serious health problem (like cancer, heart failure, an organ transplant or kidney disease): Call your specialty clinic. Let them know that you might have COVID-19.    When it's time for your COVID test:  Stay at least 6 feet away from others. (If someone will drive you to your test, stay in the backseat, as far away from the  as you can.)  Cover your mouth and nose with a mask, tissue or washcloth.  Go straight to the testing site. Don't make any stops on the way there or back.    Please note  Patients in these groups are at risk for severe illness due to COVID-19:    People 65 years and older    People who live in a nursing home or long-term care facility    People with chronic disease (lung, heart, cancer, diabetes, kidney, liver, immunologic)    People who have a weakened immune system, including those who:  o Are in cancer treatment  o Take medicine that weakens the immune system, such as corticosteroids  o Had a bone marrow or organ transplant  o Have an immune deficiency  o Have poorly controlled HIV or AIDS  o Are obese (body mass index of 40 or higher)  o Smoke regularly    Where can I get more information?  [facility] - About COVID-19: [email or web address]  CDC - What to Do If You're Sick: www.cdc.gov/coronavirus/2019-ncov/about/steps-when-sick.html  CDC - Ending Home Isolation: www.cdc.gov/coronavirus/2019-ncov/hcp/disposition-in-home-patients.html  CDC - Caring for Someone: www.cdc.gov/coronavirus/2019-ncov/if-you-are-sick/care-for-someone.html  [facility] - Interim Guidance for Hospital Discharge to Home: www.health.[facility].mn.us/diseases/coronavirus/hcp/hospdischarge.pdf  [facility] clinical trials (COVID-19 research studies): clinicalaffairs.[facility]/n-clinical-trials  Below are the COVID-19 hotlines at the [place] Department of Health ([facility]). Interpreters are available.  For health questions: Call 179-667-8497 or 1-301.464.2539 (7 a.m. to 7 p.m.)  For  questions about schools and childcare: Call 711-267-2879 or 1-619.233.5156 (7 a.m. to 7 p.m.)    November 21, 2020    RE:  Melody Cash                                                                                                                                                       [address] [address]        To whom it may concern:    I evaluated Melody Cash on November 21, 2020. Melody Cash should be excused from work/school.    If you were exposed to someone who has tested positive for COVID-19, you can return to work 14 days after your last contact with the positive individual, provided you do not have symptoms at all during that time. In some cases, your manager may ask you to come back sooner than 14 days.     Note: If you have ongoing exposure to the covid positive person, this quarantine period may be more than 14 days. (For example, if you are still being exposed to your child and cannot isolate from them, then the quarantine of 14 days can't start until your child is no longer contagious. This is typically 10 day from onset of the child's symptoms. So the total duration is 24 days in this case.)       Sincerely,  Melvin Hummel PA-C

## 2020-11-23 ENCOUNTER — VIRTUAL VISIT (OUTPATIENT)
Dept: FAMILY MEDICINE | Facility: OTHER | Age: 25
End: 2020-11-23
Payer: COMMERCIAL

## 2020-11-23 PROCEDURE — 99421 OL DIG E/M SVC 5-10 MIN: CPT | Performed by: PHYSICIAN ASSISTANT

## 2020-11-23 NOTE — PROGRESS NOTES
"Date: 2020 10:06:53  Clinician: Melvin Hummel  Clinician NPI: [id number]  Patient: Melody Cash  Patient : 1995  Patient Address: [address] [place] [address]  Patient Phone: [phone]  Visit Protocol: URI  Patient Summary:  Melody is a 25 year old ( : 1995 ) female who initiated a [facility] Visit for COVID-19 (Coronavirus) evaluation and screening. When asked the question \"Please sign me up to receive news, health information and promotions from [facility].\", Melody responded \"No\".    When asked when her symptoms started, Melody reported that she does not have any symptoms.   She denies taking antibiotic medication in the past month and having recent facial or sinus surgery in the past 60 days.    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 She provides direct patient care. In the past 14 days, Melody has worked or volunteered at a hospital or a clinic. Additional job details as reported by the patient (free text): Registered Nurse on pediatric med/surg at [employer]   In the past 14 days, she has not lived in a congregate living setting.   Melody has had a close contact with a laboratory-confirmed COVID-19 patient in the last 14 days. Date Melody was exposed to the laboratory-confirmed COVID-19 patient: 2020   Additional information about contact with COVID-19 (Coronavirus) patient as reported by the patient (free text): Roommate and I share a bathroom and I was in the living room with her for about 30 minutes unmasked the day before she started showing symptoms of COVID and then tested positive   Melody is living in the same household with the COVID-19 positive patient. She was in an enclosed space for greater than 15 minutes with the COVID-19 patient.   During the encounter, neither were wearing masks.   Since 2019, Melody has been tested for COVID-19 and has not had upper " respiratory infection or influenza-like illness.      Result of COVID-19 test: Negative     Date of her COVID-19 test: 10/19/2020      Pertinent medical history  Melody does not get yeast infections when she takes antibiotics.   Melody does not need a return to work/school note.   Weight: 125 lbs   Melody does not smoke or use smokeless tobacco.   She denies pregnancy and denies breastfeeding. She does not menstruate.   Weight: 125 lbs    MEDICATIONS: calcium-multivitamin with iron oral, Mirena intrauterine, ALLERGIES: NKDA  Clinician Response:  Dear Melody,   Based on your exposure to COVID-19 (coronavirus), we would like to test you for this virus.  1. Please call [phone] to schedule your visit. Explain that you were referred by [facility] to have a COVID-19 test. Be ready to share your [facility] visit ID number.  * If you need to schedule in [facility] please call [phone] or for [employer] employees please call [phone].   * If you need to schedule in the [place] area please call [phone]. [employer] employees call [phone].   The following will serve as your written order for this COVID Test, ordered by me, for the indication of suspected COVID [Z20.828]: The test will be ordered in [employer], our electronic health record, after you are scheduled. It will show as ordered and authorized by Miki Barry MD.  Order: COVID-19 (coronavirus) PCR for ASYMPTOMATIC EXPOSURE testing from [facility].   If you know you have had close contact with someone who tested positive, you should be quarantined for 14 days after this exposure. You should stay in quarantine for the14 days even if the covid test is negative, the optimal time to test after exposure is 5-7 days from the exposure  Quarantine means   What should I do?  For safety, it's very important to follow these rules. Do this for 14 days after the date you were last exposed to the virus..  Stay home and away from others. Don't go to school or anywhere else. Generally  quarantine means staying home from work but there are some exceptions to this. Please contact your workplace.   No hugging, kissing or shaking hands.  Don't let anyone visit.  Cover your mouth and nose with a mask, tissue or washcloth to avoid spreading germs.  Wash your hands and face often. Use soap and water.  What are the symptoms of COVID-19?  The most common symptoms are cough, fever and trouble breathing. Less common symptoms include headache, body aches, fatigue (feeling very tired), chills, sore throat, stuffy or runny nose, diarrhea (loose poop), loss of taste or smell, belly pain, and nausea or vomiting (feeling sick to your stomach or throwing up).  After 14 days, if you have still don't have symptoms, you likely don't have this virus.  If you develop symptoms, follow these guidelines.  If you're normally healthy: Please start another [facility] visit to report your symptoms. Go to [email or web address].  If you have a serious health problem (like cancer, heart failure, an organ transplant or kidney disease): Call your specialty clinic. Let them know that you might have COVID-19.  2. When it's time for your COVID test:  Stay at least 6 feet away from others. (If someone will drive you to your test, stay in the backseat, as far away from the  as you can.)  Cover your mouth and nose with a mask, tissue or washcloth.  Go straight to the testing site. Don't make any stops on the way there or back.  Please note  Caregivers in these groups are at risk for severe illness due to COVID-19:  o People 65 years and older  o People who live in a nursing home or long-term care facility  o People with chronic disease (lung, heart, cancer, diabetes, kidney, liver, immunologic)  o People who have a weakened immune system, including those who:  Are in cancer treatment  Take medicine that weakens the immune system, such as corticosteroids  Had a bone marrow or organ transplant  Have an immune deficiency  Have poorly controlled HIV  or AIDS  Are obese (body mass index of 40 or higher)  Smoke regularly  Where can I get more information?   [facility] -- About COVID-19: [email or web address]  CDC -- What to Do If You're Sick: www.cdc.gov/coronavirus/2019-ncov/about/steps-when-sick.html  CDC -- Ending Home Isolation: www.cdc.gov/coronavirus/2019-ncov/hcp/disposition-in-home-patients.html  [facility] -- Caring for Someone: www.cdc.gov/coronavirus/2019-ncov/if-you-are-sick/care-for-someone.html  [facility] -- Interim Guidance for Hospital Discharge to Home: www.[facility].mn./diseases/coronavirus/hcp/hospdischarge.pdf  [facility] clinical trials (COVID-19 research studies): clinicalaffairs.[facility]-clinical-trials  Below are the COVID-19 hotlines at the [facility] of Health ([facility]). Interpreters are available.  For health questions: Call 218-224-0384 or 1-445.199.8518 (7 a.m. to 7 p.m.)  For questions about schools and childcare: Call 878-944-3180 or 1-384.727.8110 (7 a.m. to 7 p.m.)    Diagnosis: Contact with and (suspected) exposure to other viral communicable diseases  Diagnosis ICD: Z20.828

## 2020-11-24 DIAGNOSIS — Z20.822 CLOSE EXPOSURE TO 2019 NOVEL CORONAVIRUS: ICD-10-CM

## 2020-11-24 PROCEDURE — U0003 INFECTIOUS AGENT DETECTION BY NUCLEIC ACID (DNA OR RNA); SEVERE ACUTE RESPIRATORY SYNDROME CORONAVIRUS 2 (SARS-COV-2) (CORONAVIRUS DISEASE [COVID-19]), AMPLIFIED PROBE TECHNIQUE, MAKING USE OF HIGH THROUGHPUT TECHNOLOGIES AS DESCRIBED BY CMS-2020-01-R: HCPCS | Performed by: PHYSICIAN ASSISTANT

## 2020-11-25 LAB
SARS-COV-2 RNA SPEC QL NAA+PROBE: NOT DETECTED
SPECIMEN SOURCE: NORMAL

## 2021-01-10 ENCOUNTER — HEALTH MAINTENANCE LETTER (OUTPATIENT)
Age: 26
End: 2021-01-10

## 2021-02-04 ENCOUNTER — OFFICE VISIT (OUTPATIENT)
Dept: OBGYN | Facility: CLINIC | Age: 26
End: 2021-02-04
Payer: COMMERCIAL

## 2021-02-04 VITALS
TEMPERATURE: 98.5 F | HEART RATE: 62 BPM | DIASTOLIC BLOOD PRESSURE: 62 MMHG | WEIGHT: 128 LBS | SYSTOLIC BLOOD PRESSURE: 100 MMHG

## 2021-02-04 DIAGNOSIS — Z00.00 ANNUAL PHYSICAL EXAM: Primary | ICD-10-CM

## 2021-02-04 DIAGNOSIS — Z30.431 IUD CHECK UP: ICD-10-CM

## 2021-02-04 DIAGNOSIS — Z23 NEED FOR TDAP VACCINATION: ICD-10-CM

## 2021-02-04 LAB — HIV 1+2 AB+HIV1 P24 AG SERPL QL IA: NONREACTIVE

## 2021-02-04 PROCEDURE — 87491 CHLMYD TRACH DNA AMP PROBE: CPT | Performed by: OBSTETRICS & GYNECOLOGY

## 2021-02-04 PROCEDURE — 36415 COLL VENOUS BLD VENIPUNCTURE: CPT | Performed by: OBSTETRICS & GYNECOLOGY

## 2021-02-04 PROCEDURE — 90471 IMMUNIZATION ADMIN: CPT | Performed by: OBSTETRICS & GYNECOLOGY

## 2021-02-04 PROCEDURE — 87389 HIV-1 AG W/HIV-1&-2 AB AG IA: CPT | Performed by: OBSTETRICS & GYNECOLOGY

## 2021-02-04 PROCEDURE — 99395 PREV VISIT EST AGE 18-39: CPT | Mod: 25 | Performed by: OBSTETRICS & GYNECOLOGY

## 2021-02-04 PROCEDURE — 99212 OFFICE O/P EST SF 10 MIN: CPT | Mod: 25 | Performed by: OBSTETRICS & GYNECOLOGY

## 2021-02-04 PROCEDURE — 90715 TDAP VACCINE 7 YRS/> IM: CPT | Performed by: OBSTETRICS & GYNECOLOGY

## 2021-02-04 PROCEDURE — 87591 N.GONORRHOEAE DNA AMP PROB: CPT | Performed by: OBSTETRICS & GYNECOLOGY

## 2021-02-04 NOTE — NURSING NOTE
Chief Complaint   Patient presents with     Physical       Initial /62 (BP Location: Left arm, Patient Position: Sitting, Cuff Size: Adult Regular)   Pulse 62   Temp 98.5  F (36.9  C) (Oral)   Wt 58.1 kg (128 lb)  There is no height or weight on file to calculate BMI.  BP completed using cuff size: regular    Questioned patient about current smoking habits.  Pt. has never smoked.          The following HM Due: NONE      The following patient reported/Care Every where data was sent to:  P ABSTRACT QUALITY INITIATIVES [[id number]]  ANJELICA Burnette MA

## 2021-02-04 NOTE — PROGRESS NOTES
Melody is a 25 year old  female who presents for annual exam.     Menses are irregular and normal lasting 4-5 days.  Menses flow: normal.  No LMP recorded.. Using IUD for contraception.  She is not currently considering pregnancy.  Besides routine health maintenance, she has no other health concerns today .  Has some cramping with IUD.  It's better than it was when she reached out in , but still happens on occasion.  GYNECOLOGIC HISTORY:  Menarche: 11  Age at first intercourse: 18 Number of lifetime partners: less than 6  Melody is sexually active with 1male partner(s) and is currently in monogamous relationship with boyfriend.    History sexually transmitted infections:No STD history  STI testing offered?  Accepted  SIDNEY exposure: No  History of abnormal Pap smear: NO -   Family history of breast CA: No  Family history of uterine/ovarian CA: No    Family history of colon CA: Yes (Please explain): Materna Grandfather    HEALTH MAINTENANCE:  Cholesterol: (No results found for: CHOL History of abnormal lipids: No  Mammo: 0 . History of abnormal Mammo: Not applicable.  Regular Self Breast Exams: Yes  Calcium/Vitamin D intake: source:  dairy, dietary supplement(s) Adequate? Yes  TSH: (No results found for: TSH )  Pap; (  Lab Results   Component Value Date    PAP NIL 2019    )    HISTORY:  OB History    Para Term  AB Living   0 0 0 0 0 0   SAB TAB Ectopic Multiple Live Births   0 0 0 0 0     No past medical history on file.  Past Surgical History:   Procedure Laterality Date     widsom teeth       Family History   Problem Relation Age of Onset     Dementia Mother      Hypertension Maternal Grandfather      Hyperlipidemia Maternal Grandfather      Blood Disease Maternal Grandfather      Cancer Maternal Grandfather      Colon Cancer Maternal Grandfather      Cancer Paternal Grandmother      Coronary Artery Disease Other         PGFA     Diabetes Other         PGFA     Social History      Socioeconomic History     Marital status: Single     Spouse name: None     Number of children: None     Years of education: None     Highest education level: None   Occupational History     None   Social Needs     Financial resource strain: None     Food insecurity     Worry: None     Inability: None     Transportation needs     Medical: None     Non-medical: None   Tobacco Use     Smoking status: Never Smoker     Smokeless tobacco: Never Used   Substance and Sexual Activity     Alcohol use: Yes     Frequency: Monthly or less     Drinks per session: 1 or 2     Binge frequency: Never     Comment: socially     Drug use: No     Sexual activity: Not Currently     Partners: Male   Lifestyle     Physical activity     Days per week: None     Minutes per session: None     Stress: None   Relationships     Social connections     Talks on phone: None     Gets together: None     Attends Worship service: None     Active member of club or organization: None     Attends meetings of clubs or organizations: None     Relationship status: None     Intimate partner violence     Fear of current or ex partner: None     Emotionally abused: None     Physically abused: None     Forced sexual activity: None   Other Topics Concern     None   Social History Narrative     None       Current Outpatient Medications:      calcium carbonate-vitamin D (OS-LIN) 500-400 MG-UNIT tablet, Take 1 tablet by mouth daily, Disp: , Rfl:      Fe Bisgly-Succ-C-Thre-B12-FA (IRON-150 PO), , Disp: , Rfl:      levonorgestrel (MIRENA) 20 MCG/24HR IUD, 1 each by Intrauterine route once, Disp: , Rfl:      levonorgestrel (MIRENA, 52 MG,) 20 MCG/24HR IUD, 1 each (20 mcg) by Intrauterine route once for 1 dose, Disp: 1 each, Rfl: 0   No Known Allergies    Past medical, surgical, social and family history were reviewed and updated in EPIC.    ROS:   C:     NEGATIVE for fever, chills, change in weight  I:       NEGATIVE for worrisome rashes, moles or lesions  E:      NEGATIVE for vision changes or irritation  E/M: NEGATIVE for ear, mouth and throat problems  R:     NEGATIVE for significant cough or SOB  CV:   NEGATIVE for chest pain, palpitations or peripheral edema  GI:     NEGATIVE for nausea, abdominal pain, heartburn, or change in bowel habits  :   Occ cramping.  NEGATIVE for frequency, dysuria, hematuria, vaginal discharge, or irregular bleeding  M:     NEGATIVE for significant arthralgias or myalgia  N:      NEGATIVE for weakness, dizziness or paresthesias  E:      NEGATIVE for temperature intolerance, skin/hair changes  P:      NEGATIVE for changes in mood or affect.    EXAM:  /62 (BP Location: Left arm, Patient Position: Sitting, Cuff Size: Adult Regular)   Pulse 62   Temp 98.5  F (36.9  C) (Oral)   Wt 58.1 kg (128 lb)    BMI: There is no height or weight on file to calculate BMI.  Constitutional: healthy, alert and no distress  Head: Normocephalic. No masses, lesions, tenderness or abnormalities  Neck: Neck supple. Trachea midline. No adenopathy. Thyroid symmetric, normal size.   Cardiovascular: RRR.   Respiratory: Negative.   Breast: No nodularity, asymmetry or nipple discharge bilaterally.  Gastrointestinal: Abdomen soft, non-tender, non-distended. No masses, organomegaly.  :  Vulva:  No external lesions, normal female hair distribution, no inguinal adenopathy.    Urethra:  Midline, non-tender, well supported, no discharge  Vagina:  Moist, pink, no abnormal discharge, no lesions.  IUD strings visible in cervical os and appear appropriate in length  Uterus:  Normal size, anteverted , non-tender, freely mobile  Ovaries:  No masses appreciated, non-tender, mobile  Rectal Exam: deferred  Musculoskeletal: extremities normal  Skin: no suspicious lesions or rashes  Psychiatric: Affect appropriate, cooperative,mentation appears normal.     COUNSELING:   Reviewed preventive health counseling, as reflected in patient instructions       Contraception       Family  planning   reports that she has never smoked. She has never used smokeless tobacco.    There is no height or weight on file to calculate BMI.    FRAX Risk Assessment    ASSESSMENT:  25 year old female with satisfactory annual exam  (Z00.00) Annual physical exam  (primary encounter diagnosis)  Comment: Normal exam today.  STI screening collected per patient request.  HIV drawn due to recommendation for HIV test at least once in lifetime  Plan: NEISSERIA GONORRHOEA PCR, CHLAMYDIA TRACHOMATIS        PCR, HIV Antigen Antibody Combo            (Z30.431) IUD check up  Comment: Will confirm IUD placement with ultrasound.  Plan: US Transvaginal Non OB            (Z23) Need for Tdap vaccination  Comment: routine vaccination  Plan: TDAP VACCINE (Adacel, Boostrix)  [[id number]]          Aarti Flowers MD

## 2021-02-05 LAB
C TRACH DNA SPEC QL NAA+PROBE: NEGATIVE
N GONORRHOEA DNA SPEC QL NAA+PROBE: NEGATIVE
SPECIMEN SOURCE: NORMAL
SPECIMEN SOURCE: NORMAL

## 2021-02-05 NOTE — PATIENT INSTRUCTIONS
Patient Education     Prevention Guidelines, Women Ages 18 to 39  Screening tests and vaccines are an important part of managing your health. A screening test is done to find possible disorders or diseases in people who don't have any symptoms. The goal is to find a disease early so lifestyle changes can be made and you can be watched more closely to reduce the risk of disease, or to detect it early enough to treat it most effectively. Screening tests are not considered diagnostic, but are used to determine if more testing is needed. Health counseling is essential, too. Below are guidelines for these, for women ages 18 to 39. Talk with your healthcare provider to make sure you re up-to-date on what you need.   Screening Who needs it How often   Alcohol misuse All women in this age group  At routine exams   Blood pressure All women in this age group  Yearly checkup if your blood pressure is normal   Normal blood pressure is less than 120/80 mm Hg   If your blood pressure reading is higher than normal, follow the advice of your healthcare provider    Breast cancer All women in this age group should talk with their healthcare providers about the need for clinical breast exams (CBE)1  Clinical breast exam every 3 years1    Cervical cancer Women ages 21 and older  Women between ages 21 and 29 should have a Pap test every 3 years; women between ages 30 and 65 are advised to have a Pap test plus an HPV test every 5 years    Chlamydia Sexually active women ages 24 and younger, and women at increased risk for infection  Every 3 years if you're at risk or have symptoms    Depression All women in this age group  At routine exams   Diabetes mellitus, type 2  Adults with no symptoms who are overweight or obese and have 1 or more other risk factors for diabetes  At least every 3 years. Also, testing for diabetes during pregnancy after the 24th week.     Gonorrhea Sexually active women at increased risk for infection  At routine  exams   Hepatitis C Anyone at increased risk  At routine exams   HIV All women At routine exams3     Obesity All women in this age group  At routine exams   Syphilis Women at increased risk for infection should talk with their healthcare provider  At routine exams   Tuberculosis Women at increased risk for infection should talk with their healthcare provider  Ask your healthcare provider    Vision All women in this age group  At least 1 complete exam in your 20s, and 2 in your 30s    Vaccine Who needs it How often   Chickenpox (varicella)  All women in this age group who have no record of this infection or vaccine  2 doses; the second dose should be given 4 to 8 weeks after the first dose    Hepatitis A Women at increased risk for infection should talk with their healthcare provider  2 doses given at least 6 months apart    Hepatitis B Women at increased risk for infection should talk with their healthcare provider  3 doses over 6 months; second dose should be given 1 month after the first dose; the third dose should be given at least 2 months after the second dose and at least 4 months after the first dose    Haemophilus influenzae  Type B (HIB)  Women at increased risk for infection should talk with their healthcare provider  1 to 3 doses   Human papillomavirus (HPV)  All women in this age group up to age 26  3 doses; the second dose should be given 1 to 2 months after the first dose and the third dose given 6 months after the first dose    Influenza (flu) All women in this age group  Once a year   Measles, mumps, rubella (MMR)  All women in this age group who have no record of these infections or vaccines  1 or 2 doses   Meningococcal Women at increased risk for infection should talk with their healthcare provider  1 or more doses   Pneumococcal conjugate vaccine (PCV13) and pneumococcal polysaccharide vaccine (PPSV23)  Women at increased risk for infection should talk with their healthcare provider  PCV13: 1  dose ages 19 to 65 (protects against 13 types of pneumococcal bacteria)   PPSV23: 1 to 2 doses through age 64, or 1 dose at 65 or older (protects against 23 types of pneumococcal bacteria)    Tetanus/diphtheria/pertussis (Td/Tdap) booster All women in this age group  Td every 10 years, or a one-time dose of Tdap instead of a Td booster after age 18, then Td every 10 years    Counseling Who needs it How often   BRCA gene mutation testing for breast and ovarian cancer susceptibility  Women with increased risk for having gene mutation  When your risk is known    Breast cancer and chemoprevention  Women at high risk for breast cancer  When your risk is known    Diet and exercise Women who are overweight or obese  When diagnosed, and then at routine exams    Domestic violence Women at the age in which they are able to have children  At routine exams   Sexually transmitted infection prevention  Women who are sexually active  At routine exams   Skin cancer Prevention of skin cancer in fair-skinned adults  At routine exams   Use of tobacco and the health effects it can cause  All women in this age group  Every visit   1 According to the ACS, women ages 20 to 39 years should have a clinical breast exam (CBE) as part of their routine health exam every 3 years. Breast self-exams are an option for women starting in their 20s. But the U.S. Preventive Services Task Force (USPSTF) does not recommend CBE.   2 Those who are 18 years old and not up-to-date on their childhood vaccines should get all appropriate catch-up vaccines recommended by the CDC.   3 The USPSTF recommends that all people ages 15 to 65 years be screened for HIV and those younger or older people at increased risk. The CDC recommends that everyone between the ages of 13 and 64 get tested for HIV at least once as part of routine health care.   [employer] last reviewed this educational content on 10/1/2017    3892-6310 [employer] [facility]. [address] [facility],  [place] [address]. All rights reserved. This information is not intended as a substitute for professional medical care. Always follow your healthcare professional's instructions.

## 2021-02-18 ENCOUNTER — ANCILLARY PROCEDURE (OUTPATIENT)
Dept: ULTRASOUND IMAGING | Facility: CLINIC | Age: 26
End: 2021-02-18
Attending: OBSTETRICS & GYNECOLOGY
Payer: COMMERCIAL

## 2021-02-18 DIAGNOSIS — Z30.431 IUD CHECK UP: ICD-10-CM

## 2021-02-18 PROCEDURE — 76830 TRANSVAGINAL US NON-OB: CPT | Performed by: OBSTETRICS & GYNECOLOGY

## 2021-10-18 ENCOUNTER — ANCILLARY PROCEDURE (OUTPATIENT)
Dept: BONE DENSITY | Facility: CLINIC | Age: 26
End: 2021-10-18
Attending: PHYSICIAN ASSISTANT
Payer: COMMERCIAL

## 2021-10-18 DIAGNOSIS — K90.0 CELIAC DISEASE: ICD-10-CM

## 2021-10-18 PROCEDURE — 77080 DXA BONE DENSITY AXIAL: CPT | Performed by: INTERNAL MEDICINE

## 2022-01-24 ENCOUNTER — OFFICE VISIT (OUTPATIENT)
Dept: OBGYN | Facility: CLINIC | Age: 27
End: 2022-01-24
Payer: COMMERCIAL

## 2022-01-24 VITALS
HEIGHT: 65 IN | DIASTOLIC BLOOD PRESSURE: 63 MMHG | HEART RATE: 89 BPM | WEIGHT: 127 LBS | BODY MASS INDEX: 21.16 KG/M2 | OXYGEN SATURATION: 96 % | SYSTOLIC BLOOD PRESSURE: 101 MMHG

## 2022-01-24 DIAGNOSIS — Z00.00 ANNUAL PHYSICAL EXAM: Primary | ICD-10-CM

## 2022-01-24 PROCEDURE — G0145 SCR C/V CYTO,THINLAYER,RESCR: HCPCS | Performed by: OBSTETRICS & GYNECOLOGY

## 2022-01-24 PROCEDURE — 99395 PREV VISIT EST AGE 18-39: CPT | Performed by: OBSTETRICS & GYNECOLOGY

## 2022-01-24 ASSESSMENT — MIFFLIN-ST. JEOR: SCORE: 1306.95

## 2022-01-24 NOTE — PROGRESS NOTES
Melody is a 26 year old  female who presents for annual exam.     Menses are rare and variable lasting 5 days.  Menses flow: normal and light.  No LMP recorded. (Menstrual status: IUD).. Using IUD for contraception.  She is not currently considering pregnancy.  Besides routine health maintenance, she has no other health concerns today .  Mirena.  Some cramping with cycles.  Has period every other month, sometimes monthly.  No concerns or complaints about IUD.  Has noticed breasts feel more dense in last few months, but no discrete masses.  No skin changes or nipple discharge.  GYNECOLOGIC HISTORY:  Menarche: 12  Age at first intercourse: 18 Number of lifetime partners: less than 6  Melody is sexually active with 1male partner(s) and is currently in monogamous relationship with boyfriend.    History sexually transmitted infections:No STD history  STI testing offered?  Declined  SIDNEY exposure: No  History of abnormal Pap smear: NO - age  recommended  Family history of breast CA: No  Family history of uterine/ovarian CA: No    Family history of colon CA: m grandpa    HEALTH MAINTENANCE:  Cholesterol: (No results found for: CHOL History of abnormal lipids: No  Mammo:  . History of abnormal Mammo: Not applicable.  Regular Self Breast Exams: Yes  Calcium/Vitamin D intake: source:  dairy, dietary supplement(s) Adequate? Yes  TSH: (No results found for: TSH )  Pap; (  Lab Results   Component Value Date    PAP NIL 2019    )    HISTORY:  OB History    Para Term  AB Living   0 0 0 0 0 0   SAB IAB Ectopic Multiple Live Births   0 0 0 0 0     No past medical history on file.  Past Surgical History:   Procedure Laterality Date     widsom teeth       Family History   Problem Relation Age of Onset     Dementia Mother      Hypertension Maternal Grandfather      Hyperlipidemia Maternal Grandfather      Blood Disease Maternal Grandfather      Cancer Maternal Grandfather      Colon Cancer Maternal Grandfather       Cancer Paternal Grandmother      Coronary Artery Disease Other         PGFA     Diabetes Other         PGFA     Social History     Socioeconomic History     Marital status: Single     Spouse name: None     Number of children: None     Years of education: None     Highest education level: None   Occupational History     None   Tobacco Use     Smoking status: Never Smoker     Smokeless tobacco: Never Used   Substance and Sexual Activity     Alcohol use: Yes     Comment: socially     Drug use: No     Sexual activity: Not Currently     Partners: Male   Other Topics Concern     None   Social History Narrative     None     Social Determinants of Health     Financial Resource Strain: Not on file   Food Insecurity: Not on file   Transportation Needs: Not on file   Physical Activity: Not on file   Stress: Not on file   Social Connections: Not on file   Intimate Partner Violence: Not on file   Housing Stability: Not on file       Current Outpatient Medications:      calcium carbonate-vitamin D (OS-LIN) 500-400 MG-UNIT tablet, Take 1 tablet by mouth daily, Disp: , Rfl:    No Known Allergies    Past medical, surgical, social and family history were reviewed and updated in EPIC.    ROS:   C:     NEGATIVE for fever, chills, change in weight  I:       NEGATIVE for worrisome rashes, moles or lesions  E:     NEGATIVE for vision changes or irritation  E/M: NEGATIVE for ear, mouth and throat problems  R:     NEGATIVE for significant cough or SOB  CV:   NEGATIVE for chest pain, palpitations or peripheral edema  GI:     NEGATIVE for nausea, abdominal pain, heartburn, or change in bowel habits  :   NEGATIVE for frequency, dysuria, hematuria, vaginal discharge, or irregular bleeding  M:     NEGATIVE for significant arthralgias or myalgia  N:      NEGATIVE for weakness, dizziness or paresthesias  E:      NEGATIVE for temperature intolerance, skin/hair changes  P:      NEGATIVE for changes in mood or affect.    EXAM:  /63   Pulse  "89   Ht 1.651 m (5' 5\")   Wt 57.6 kg (127 lb)   SpO2 96%   Breastfeeding No   BMI 21.13 kg/m     BMI: Body mass index is 21.13 kg/m .  Constitutional: healthy, alert and no distress  Head: Normocephalic. No masses, lesions, tenderness or abnormalities  Neck: Neck supple. Trachea midline. No adenopathy. Thyroid symmetric, normal size.   Cardiovascular: RRR.   Respiratory: Negative.   Breast: Breasts reveal mild symmetric fibrocystic densities, but there are no dominant, discrete, fixed or suspicious masses found.  Gastrointestinal: Abdomen soft, non-tender, non-distended. No masses, organomegaly.  :  Vulva:  No external lesions, normal female hair distribution, no inguinal adenopathy.    Urethra:  Midline, non-tender, well supported, no discharge  Vagina:  Moist, pink, no abnormal discharge, no lesions  Uterus:  Normal size, non-tender, freely mobile  Ovaries:  No masses appreciated, non-tender, mobile  Rectal Exam: deferred  Musculoskeletal: extremities normal  Skin: no suspicious lesions or rashes  Psychiatric: Affect appropriate, cooperative,mentation appears normal.     COUNSELING:   Reviewed preventive health counseling, as reflected in patient instructions   reports that she has never smoked. She has never used smokeless tobacco.    Body mass index is 21.13 kg/m .    FRAX Risk Assessment    ASSESSMENT:  26 year old female with satisfactory annual exam  (Z00.00) Annual physical exam  (primary encounter diagnosis)  Comment: Normal exam today.  Discussed no concerns regarding breast exam.  No changes that would suggest getting imaging.  Happy with IUD  Pap updated.  Declines STI screening  Plan: Pap screen reflex to HPV if ASCUS - recommend         age 25 - 29          Aarti Flowers MD        "

## 2022-01-26 LAB
BKR LAB AP GYN ADEQUACY: NORMAL
BKR LAB AP GYN INTERPRETATION: NORMAL
BKR LAB AP HPV REFLEX: NORMAL
BKR LAB AP PREVIOUS ABNORMAL: NORMAL
PATH REPORT.COMMENTS IMP SPEC: NORMAL
PATH REPORT.COMMENTS IMP SPEC: NORMAL
PATH REPORT.RELEVANT HX SPEC: NORMAL

## 2022-03-21 ENCOUNTER — OFFICE VISIT (OUTPATIENT)
Dept: OPTOMETRY | Facility: CLINIC | Age: 27
End: 2022-03-21
Payer: COMMERCIAL

## 2022-03-21 DIAGNOSIS — H52.13 MYOPIA OF BOTH EYES: Primary | ICD-10-CM

## 2022-03-21 PROCEDURE — 92004 COMPRE OPH EXAM NEW PT 1/>: CPT | Performed by: OPTOMETRIST

## 2022-03-21 PROCEDURE — 92015 DETERMINE REFRACTIVE STATE: CPT | Performed by: OPTOMETRIST

## 2022-03-21 PROCEDURE — 92310 CONTACT LENS FITTING OU: CPT | Mod: GA | Performed by: OPTOMETRIST

## 2022-03-21 ASSESSMENT — VISUAL ACUITY
VA_OR_OD_CURRENT_RX: 20/20
VA_OR_OS_CURRENT_RX: 20/20
CORRECTION_TYPE: CONTACTS
OD_CC: 20/20
OS_CC: 20/20
METHOD: SNELLEN - LINEAR
OS_CC: 20/20
OD_CC+: -1
OD_CC: 20/20

## 2022-03-21 ASSESSMENT — REFRACTION_CURRENTRX
OS_BRAND: ALCON AIR OPTIX AQUA BC 8.6, D 14.2
OD_SPHERE: -2.00
OS_CYLINDER: -1.25
OS_BRAND: ALCON AIR OPTIX PLUS HYDRAGLYDE BC 8.6, D 14.2
OD_SPHERE: -1.75
OS_AXIS: 170
OD_BRAND: ALCON AIR OPTIX AQUA BC 8.6, D 14.2
OS_CYLINDER: -1.25
OS_SPHERE: -0.75
OD_BRAND: ALCON AIR OPTIX PLUS HYDRAGLYDE BC 8.6, D 14.2
OS_AXIS: 170
OS_SPHERE: -1.00

## 2022-03-21 ASSESSMENT — KERATOMETRY
OD_AXISANGLE_DEGREES: 87
OS_K2POWER_DIOPTERS: 46.00
OS_AXISANGLE_DEGREES: 86
OD_K2POWER_DIOPTERS: 45.75
OS_K1POWER_DIOPTERS: 43.25
OD_K1POWER_DIOPTERS: 43.75
OS_AXISANGLE2_DEGREES: 176
OD_AXISANGLE2_DEGREES: 177

## 2022-03-21 ASSESSMENT — REFRACTION_MANIFEST
OD_CYLINDER: +0.50
OD_SPHERE: -2.25
OD_AXIS: 085
METHOD_AUTOREFRACTION: 1
OD_SPHERE: -2.00
OS_AXIS: 080
OD_CYLINDER: +0.50
OS_CYLINDER: +2.00
OD_AXIS: 083
OS_CYLINDER: +1.25
OS_SPHERE: -2.25
OS_SPHERE: -2.00
OS_AXIS: 082

## 2022-03-21 ASSESSMENT — CUP TO DISC RATIO
OS_RATIO: 0.25
OD_RATIO: 0.25

## 2022-03-21 ASSESSMENT — CONF VISUAL FIELD
OD_NORMAL: 1
OS_NORMAL: 1

## 2022-03-21 ASSESSMENT — TONOMETRY
OS_IOP_MMHG: 16
OD_IOP_MMHG: 15
IOP_METHOD: APPLANATION

## 2022-03-21 ASSESSMENT — SLIT LAMP EXAM - LIDS
COMMENTS: NORMAL
COMMENTS: NORMAL

## 2022-03-21 ASSESSMENT — EXTERNAL EXAM - RIGHT EYE: OD_EXAM: NORMAL

## 2022-03-21 ASSESSMENT — EXTERNAL EXAM - LEFT EYE: OS_EXAM: NORMAL

## 2022-03-21 NOTE — PATIENT INSTRUCTIONS
Myopia is a result of long eyes. It is commonly referred to as near-sightedness. Seeing clearly in the distance is the main challenge.    Eyeglass prescription given.    The affects of the dilating drops last for 4- 6 hours.  You will be more sensitive to light and vision will be blurry up close.  Do not drive if you do not feel comfortable.  Mydriatic sunglasses were given if needed.      Annual Eye examinations recommended    Lena Ruano O.D.  [address] [address] [phone]

## 2022-03-21 NOTE — PROGRESS NOTES
Chief Complaint   Patient presents with     Annual Eye Exam     Would like contacts- $75 fit fee OK      Noticing that at times the vision isn't as clear with contacts. Likes Air Optix but is open to the possibility of switching     Last Eye Exam: 2021  Dilated Previously: Yes, side effects of dilation explained today    What are you currently using to see?  glasses and contacts       Distance Vision Acuity: Satisfied with vision    Near Vision Acuity: Satisfied with vision while reading and using computer with glasses and contacts    Eye Comfort: good  Do you use eye drops? : No  Occupation or Hobbies: Nurse    Sydney Mcghee - Optometric Assistant          Medical, surgical and family histories reviewed and updated 3/21/2022.       OBJECTIVE: See Ophthalmology exam    ASSESSMENT:    ICD-10-CM    1. Myopia of both eyes - Both Eyes  H52.13 EYE EXAM (SIMPLE-NONBILLABLE)     REFRACTION     CONTACT LENS FITTING,BILAT w/ signed waiver      PLAN:     Patient Instructions   Myopia is a result of long eyes. It is commonly referred to as near-sightedness. Seeing clearly in the distance is the main challenge.    Eyeglass prescription given.    The affects of the dilating drops last for 4- 6 hours.  You will be more sensitive to light and vision will be blurry up close.  Do not drive if you do not feel comfortable.  Mydriatic sunglasses were given if needed.      Annual Eye examinations recommended    Lena Ruano O.D.  [address] [address]    [phone]

## 2022-03-21 NOTE — LETTER
3/21/2022         RE: Melody Cash  [address]  [address]  [place] [facility] [address]        Dear Colleague,    Thank you for referring your patient, Melody Cash, to the [facility]. Please see a copy of my visit note below.    Chief Complaint   Patient presents with     Annual Eye Exam     Would like contacts- $75 fit fee OK      Noticing that at times the vision isn't as clear with contacts. Likes Air Optix but is open to the possibility of switching     Last Eye Exam: 2021  Dilated Previously: Yes, side effects of dilation explained today    What are you currently using to see?  glasses and contacts       Distance Vision Acuity: Satisfied with vision    Near Vision Acuity: Satisfied with vision while reading and using computer with glasses and contacts    Eye Comfort: good  Do you use eye drops? : No  Occupation or Hobbies: Nurse    Sydney Mcghee - Optometric Assistant          Medical, surgical and family histories reviewed and updated 3/21/2022.       OBJECTIVE: See Ophthalmology exam    ASSESSMENT:    ICD-10-CM    1. Myopia of both eyes - Both Eyes  H52.13 EYE EXAM (SIMPLE-NONBILLABLE)     REFRACTION     CONTACT LENS FITTING,BILAT w/ signed waiver      PLAN:     Patient Instructions   Myopia is a result of long eyes. It is commonly referred to as near-sightedness. Seeing clearly in the distance is the main challenge.    Eyeglass prescription given.    The affects of the dilating drops last for 4- 6 hours.  You will be more sensitive to light and vision will be blurry up close.  Do not drive if you do not feel comfortable.  Mydriatic sunglasses were given if needed.      Annual Eye examinations recommended    Lena Ruano O.D.  [facility]   [address] [place] [address]    [phone]             Again, thank you for allowing me to participate in the care of your patient.        Sincerely,        Lena Ruano OD

## 2022-03-24 ENCOUNTER — TELEPHONE (OUTPATIENT)
Dept: OPTOMETRY | Facility: CLINIC | Age: 27
End: 2022-03-24
Payer: COMMERCIAL

## 2022-03-24 NOTE — TELEPHONE ENCOUNTER
Brand Sphere Cylinder [place] [facility] Over-Sphere Over-Cyl Over-Cushman   Right Moises Air Optix Aqua BC 8.6, D 14.2 -2.00          Left Moises Air Optix Aqua BC 8.6, D 14.2 -1.00 -1.25 170            Ordered trials for  3/24/22 DP

## 2022-04-11 ENCOUNTER — OFFICE VISIT (OUTPATIENT)
Dept: OPTOMETRY | Facility: CLINIC | Age: 27
End: 2022-04-11
Payer: COMMERCIAL

## 2022-04-11 DIAGNOSIS — H52.13 MYOPIA OF BOTH EYES: Primary | ICD-10-CM

## 2022-04-11 PROCEDURE — 99207 PR NO BILLABLE SERVICE THIS VISIT: CPT | Performed by: OPTOMETRIST

## 2022-04-11 ASSESSMENT — REFRACTION_CURRENTRX
OS_SPHERE: -1.00
OS_CYLINDER: -1.25
OD_BRAND: ALCON AIR OPTIX AQUA BC 8.6, D 14.2
OD_SPHERE: -2.00
OS_BRAND: ALCON AIR OPTIX FOR ASTIGMATISM BC 8.7, D 14.5
OS_AXIS: 170

## 2022-04-11 ASSESSMENT — SLIT LAMP EXAM - LIDS
COMMENTS: NORMAL
COMMENTS: NORMAL

## 2022-04-11 ASSESSMENT — VISUAL ACUITY
OD_CC+: -1
METHOD: SNELLEN - LINEAR
OS_CC: 20/20
VA_OR_OS_CURRENT_RX: 20/20
OS_CC+: -1
VA_OR_OD_CURRENT_RX: 20/20
OS_CC: 20/20
OD_CC: 20/20
CORRECTION_TYPE: CONTACTS
OD_CC: 20/20

## 2022-04-11 ASSESSMENT — EXTERNAL EXAM - RIGHT EYE: OD_EXAM: NORMAL

## 2022-04-11 ASSESSMENT — EXTERNAL EXAM - LEFT EYE: OS_EXAM: NORMAL

## 2022-04-11 NOTE — PROGRESS NOTES
Chief Complaint   Patient presents with     Contact Lens Follow Up     Satisfied with contacts:  Yes    Good comfort:  Yes  Clear vision:     Yes    Denelle Litzy - Optometric Assistant          Medical, surgical and family histories reviewed and updated 4/11/2022.       OBJECTIVE: See Ophthalmology exam    ASSESSMENT:  No diagnosis found.   PLAN:    There are no Patient Instructions on file for this visit.

## 2022-04-11 NOTE — LETTER
4/11/2022         RE: Melody Cash  [address]  [address]  [place] [address]        Dear Colleague,    Thank you for referring your patient, Melody Cash, to the [facility]. Please see a copy of my visit note below.    Chief Complaint   Patient presents with     Contact Lens Follow Up     Satisfied with contacts:  Yes    Good comfort:  Yes  Clear vision:     Yes    Denty Litzy - Optometric Assistant          Medical, surgical and family histories reviewed and updated 4/11/2022.       OBJECTIVE: See Ophthalmology exam    ASSESSMENT:  No diagnosis found.   PLAN:    There are no Patient Instructions on file for this visit.                     Again, thank you for allowing me to participate in the care of your patient.        Sincerely,        Lena Ruano OD

## 2022-05-05 NOTE — PROGRESS NOTES
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  ____________________________________________    Assessment & Plan:    # Multiple benign nevi  - No concerning lesions today  - Monitor for ABCDEs of melanoma   - Continue sun protection - recommend SPF 30 or higher with frequent application   - Return sooner if noticing changing or symptomatic lesions     Procedures Performed:   None    Follow-up: 3 year(s) in-person, or earlier for new or changing lesions    Staff and Scribe:     Scribe Disclosure:  I, Kalyan Finch, am serving as a scribe to document services personally performed by Sanaz Hayes MD based on data collection and the provider's statements to me.     Provider Disclosure:   The documentation recorded by the scribe accurately reflects the services I personally performed and the decisions made by me.    Sanaz Hayes MD    Department of Dermatology  [facility]: Phone: [phone], Fax: [phone]  5/6/2022     ____________________________________________    CC: Skin Check (States she has some concerns about right arm and abdomen)    HPI:  Ms. Melody Cash is a(n) 26 year old female who presents today as a return patient for FBSE. Last seen in dermatology by Wendy Howell PA-C, on 1/6/2020, at which time no lesions of concern were noted.    Today, patient denies new spots of concern on her skin since last visit. Denies personal history of skin cancer. Reports history of blistering sunburns during childhood. Denies history of tanning bed use.    Patient is otherwise feeling well, without additional skin concerns.    Labs  Reviewed:  N/A    Physical Exam:  Vitals: There were no vitals taken for this visit.  SKIN: Total skin excluding the undergarment areas was performed. The exam included the head/face, neck, both arms, chest, back, abdomen, both legs, digits and/or nails.   - Multiple regular brown pigmented macules and papules are identified on the trunk and extremities.   - No other lesions of concern on areas examined.     Medications:  Current Outpatient Medications   Medication     calcium carbonate-vitamin D (OS-LIN) 500-400 MG-UNIT tablet     No current facility-administered medications for this visit.      Past Medical History:   There is no problem list on file for this patient.    History reviewed. No pertinent past medical history.     CC Referred Self, MD  No address on file on close of this encounter.

## 2022-05-06 ENCOUNTER — OFFICE VISIT (OUTPATIENT)
Dept: DERMATOLOGY | Facility: CLINIC | Age: 27
End: 2022-05-06
Payer: COMMERCIAL

## 2022-05-06 DIAGNOSIS — Z80.8 FAMILY HISTORY OF MELANOMA: ICD-10-CM

## 2022-05-06 DIAGNOSIS — D22.9 MULTIPLE BENIGN NEVI: Primary | ICD-10-CM

## 2022-05-06 PROCEDURE — 99212 OFFICE O/P EST SF 10 MIN: CPT | Performed by: DERMATOLOGY

## 2022-05-06 ASSESSMENT — PAIN SCALES - GENERAL: PAINLEVEL: NO PAIN (0)

## 2022-05-06 NOTE — LETTER
Date:May 6, 2022      Patient was self referred, no letter generated. Do not send.        [facility] Health Information       Billing Type: Third-Party Bill

## 2022-05-06 NOTE — NURSING NOTE
Dermatology Rooming Note    Melody Cash's goals for this visit include:   Chief Complaint   Patient presents with     Skin Check     States she has some concerns about right arm and abdomen     Eda Frost, Visit Facilitator

## 2022-05-06 NOTE — PATIENT INSTRUCTIONS
Patient Education     Checking for Skin Cancer  You can find cancer early by checking your skin each month. There are 3 kinds of skin cancer. They are melanoma, basal cell carcinoma, and squamous cell carcinoma. Doing monthly skin checks is the best way to find new marks or skin changes. Follow the instructions below for checking your skin.   The ABCDEs of checking moles for melanoma   Check your moles or growths for signs of melanoma using ABCDE:   Asymmetry: the sides of the mole or growth don t match  Border: the edges are ragged, notched, or blurred  Color: the color within the mole or growth varies  Diameter: the mole or growth is larger than 6 mm (size of a pencil eraser)  Evolving: the size, shape, or color of the mole or growth is changing (evolving is not shown in the images below)    Checking for other types of skin cancer  Basal cell carcinoma or squamous cell carcinoma have symptoms such as:     A spot or mole that looks different from all other marks on your skin  Changes in how an area feels, such as itching, tenderness, or pain  Changes in the skin's surface, such as oozing, bleeding, or scaliness  A sore that does not heal  New swelling or redness beyond the border of a mole    Who s at risk?  Anyone can get skin cancer. But you are at greater risk if you have:   Fair skin, light-colored hair, or light-colored eyes  Many moles or abnormal moles on your skin  A history of sunburns from sunlight or tanning beds  A family history of skin cancer  A history of exposure to radiation or chemicals  A weakened immune system  If you have had skin cancer in the past, you are at risk for recurring skin cancer.   How to check your skin  Do your monthly skin checkups in front of a full-length mirror. Check all parts of your body, including your:   Head (ears, face, neck, and scalp)  Torso (front, back, and sides)  Arms (tops, undersides, upper, and lower armpits)  Hands (palms, backs, and fingers, including  under the nails)  Buttocks and genitals  Legs (front, back, and sides)  Feet (tops, soles, toes, including under the nails, and between toes)  If you have a lot of moles, take digital photos of them each month. Make sure to take photos both up close and from a distance. These can help you see if any moles change over time.   Most skin changes are not cancer. But if you see any changes in your skin, call your doctor right away. Only he or she can diagnose a problem. If you have skin cancer, seeing your doctor can be the first step toward getting the treatment that could save your life.   [employer] last reviewed this educational content on 4/1/2019 2000-2020 [employer]. [address] [place] [address], [place] [address]. All rights reserved. This information is not intended as a substitute for professional medical care. Always follow your healthcare professional's instructions.       When should I call my doctor?  If you are worsening or not improving, please, contact us or seek urgent care as noted below.     Who should I call with questions (adults)?  [facility] (adult and pediatric): [phone]  [facility] (adult): [phone]  For urgent needs outside of business hours call the [facility] at [phone] and ask for the dermatology resident on call to be paged  If this is a medical emergency and you are unable to reach an ER, Call [phone]    Who should I call with questions (pediatric)?  [facility]- Pediatric Dermatology  Dr. Heather Greene, Dr. Gurpreet Jackson, Dr. Harriet Elena, LARRY Rivera, Dr. Paige Gr, Dr. Rach Solorzano & Dr. Giles Mcneill  Non-urgent nurse triage line; [phone]- Billie and Manasa KRAUSE Care Coordinatormilka Roblero (/Complex ) [phone]    If you need a prescription refill, please contact your pharmacy. Refills are approved or denied by our  Physicians during normal business hours, Monday through Fridays  Per office policy, refills will not be granted if you have not been seen within the past year (or sooner depending on your child's condition)    Scheduling Information:  Pediatric Appointment Scheduling and Call Center [phone]  Radiology Scheduling- [phone]  Sedation Unit Scheduling- [phone]  [facility] Scheduling- General [phone]; Pediatric Dermatology [phone]  Main  Services: [phone]  Serbian: [phone]  Eritrean: [phone]  Hmong/Ethiopian/Macedonian: [phone]  Preadmission Nursing Department Fax Number: [phone] (Fax all pre-operative paperwork to this number)    For urgent matters arising during evenings, weekends, or holidays that cannot wait for normal business hours please call [phone] and ask for the dermatology resident on call to be paged.

## 2022-05-06 NOTE — LETTER
5/6/2022       RE: Melody Cash  [address]  [address]  [place] [address]     Dear Colleague,    Thank you for referring your patient, Melody Cash, to the [facility] [place] [facility]. Please see a copy of my visit note below.    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  ____________________________________________    Assessment & Plan:    # Multiple benign nevi  - No concerning lesions today  - Monitor for ABCDEs of melanoma   - Continue sun protection - recommend SPF 30 or higher with frequent application   - Return sooner if noticing changing or symptomatic lesions     Procedures Performed:   None    Follow-up: 3 year(s) in-person, or earlier for new or changing lesions    Staff and Scribe:     Scribe Disclosure:  I, Kalyan Finch, am serving as a scribe to document services personally performed by Sanaz Hayes MD based on data collection and the provider's statements to me.     Provider Disclosure:   The documentation recorded by the scribe accurately reflects the services I personally performed and the decisions made by me.    Sanaz Hayes MD    Department of Dermatology  [facility]: Phone: [phone], Fax: [phone]  5/6/2022     ____________________________________________    CC: Skin Check (States she has some concerns about right arm and abdomen)    HPI:  Ms. Melody Cash is a(n) 26 year old female who presents today as a return patient for FBSE. Last seen in dermatology by Wendy Howell PA-C, on 1/6/2020, at which time no  lesions of concern were noted.    Today, patient denies new spots of concern on her skin since last visit. Denies personal history of skin cancer. Reports history of blistering sunburns during childhood. Denies history of tanning bed use.    Patient is otherwise feeling well, without additional skin concerns.    Labs Reviewed:  N/A    Physical Exam:  Vitals: There were no vitals taken for this visit.  SKIN: Total skin excluding the undergarment areas was performed. The exam included the head/face, neck, both arms, chest, back, abdomen, both legs, digits and/or nails.   - Multiple regular brown pigmented macules and papules are identified on the trunk and extremities.   - No other lesions of concern on areas examined.     Medications:  Current Outpatient Medications   Medication     calcium carbonate-vitamin D (OS-LIN) 500-400 MG-UNIT tablet     No current facility-administered medications for this visit.      Past Medical History:   There is no problem list on file for this patient.    History reviewed. No pertinent past medical history.     CC Referred Self, MD  No address on file on close of this encounter.      Again, thank you for allowing me to participate in the care of your patient.      Sincerely,    Sanaz Hayes MD

## 2022-07-26 ENCOUNTER — TRANSFERRED RECORDS (OUTPATIENT)
Dept: HEALTH INFORMATION MANAGEMENT | Facility: CLINIC | Age: 27
End: 2022-07-26

## 2022-09-14 ENCOUNTER — TRANSFERRED RECORDS (OUTPATIENT)
Dept: HEALTH INFORMATION MANAGEMENT | Facility: CLINIC | Age: 27
End: 2022-09-14

## 2022-10-10 ENCOUNTER — HEALTH MAINTENANCE LETTER (OUTPATIENT)
Age: 27
End: 2022-10-10

## 2023-03-25 ENCOUNTER — HEALTH MAINTENANCE LETTER (OUTPATIENT)
Age: 28
End: 2023-03-25

## 2023-04-17 ENCOUNTER — OFFICE VISIT (OUTPATIENT)
Dept: OBGYN | Facility: CLINIC | Age: 28
End: 2023-04-17
Payer: COMMERCIAL

## 2023-04-17 VITALS
OXYGEN SATURATION: 98 % | DIASTOLIC BLOOD PRESSURE: 63 MMHG | HEART RATE: 61 BPM | HEIGHT: 65 IN | BODY MASS INDEX: 21.99 KG/M2 | WEIGHT: 132 LBS | SYSTOLIC BLOOD PRESSURE: 98 MMHG

## 2023-04-17 DIAGNOSIS — Z00.00 ANNUAL PHYSICAL EXAM: Primary | ICD-10-CM

## 2023-04-17 PROCEDURE — 99395 PREV VISIT EST AGE 18-39: CPT | Performed by: OBSTETRICS & GYNECOLOGY

## 2023-04-17 NOTE — PROGRESS NOTES
Melody is a 27 year old  female who presents for annual exam.     Menses are rare and variable lasting 1 days.  Menses flow: normal and light.  No LMP recorded. (Menstrual status: IUD).. Using IUD for contraception.  She is not currently considering pregnancy.  Besides routine health maintenance, she has no other health concerns today.  Wants to talk about IUD.  Gets period about every 2 months.  Mood when getting period is really intense.  Really sad, unable to get herself out of it, lasting a day or two.  Wasn't sure if it was related to IUD or stress initially, but since it's gone on so long, thinks it may be IUD.  Didn't have any of these issues before.  Has taken LILLY before, but stopped because it was hard to take at the same time due to changing shifts at work.  Got engaged in   Planning May 2024 wedding and may consider tryin for pregnancy at the end of .  GYNECOLOGIC HISTORY:  Menarche: 11  Age at first intercourse: 18 Number of lifetime partners: less than 6  Melody is sexually active with 1male partner(s) and is currently in monogamous relationship with deyanira.    History sexually transmitted infections:No STD history  STI testing offered?  Declined  SIDNEY exposure: No  History of abnormal Pap smear: NO - 29 years younger  Family history of breast CA: No  Family history of uterine/ovarian CA: Yes (Please explain): jose great great grandma     Family history of colon CA: Yes (Please explain): m grandfather    HEALTH MAINTENANCE:  Cholesterol: (No results found for: CHOL History of abnormal lipids: No  Mammo:  . History of abnormal Mammo: Not applicable.  Regular Self Breast Exams: Yes  Calcium/Vitamin D intake: source:  dairy, dietary supplement(s) Adequate? Yes  TSH: (No results found for: TSH )  Pap; (  Lab Results   Component Value Date    PAP NIL 2019    )    HISTORY:  OB History    Para Term  AB Living   0 0 0 0 0 0   SAB IAB Ectopic Multiple Live Births   0 0 0 0 0     No  past medical history on file.  Past Surgical History:   Procedure Laterality Date     widsom teeth       Family History   Problem Relation Age of Onset     Dementia Mother      Prostate Cancer Father      Glaucoma Maternal Grandmother      Hypertension Maternal Grandfather      Hyperlipidemia Maternal Grandfather      Blood Disease Maternal Grandfather      Cancer Maternal Grandfather      Colon Cancer Maternal Grandfather      Cancer Paternal Grandmother      Melanoma Other      Coronary Artery Disease Other         PGFA     Diabetes Other         PGFA     Social History     Socioeconomic History     Marital status: Single     Spouse name: None     Number of children: None     Years of education: None     Highest education level: None   Tobacco Use     Smoking status: Never     Smokeless tobacco: Never   Substance and Sexual Activity     Alcohol use: Yes     Comment: socially     Drug use: No     Sexual activity: Not Currently     Partners: Male       Current Outpatient Medications:      calcium carbonate-vitamin D (OS-LIN) 500-400 MG-UNIT tablet, Take 1 tablet by mouth daily, Disp: , Rfl:      Allergies   Allergen Reactions     Gluten Meal GI Disturbance and Other (See Comments)     Chlorhexidine Itching     Tegaderm Transparent Dressing (Informational Only) Itching       Past medical, surgical, social and family history were reviewed and updated in EPIC.    ROS:   C:     NEGATIVE for fever, chills, change in weight  I:       NEGATIVE for worrisome rashes, moles or lesions  E:     NEGATIVE for vision changes or irritation  E/M: NEGATIVE for ear, mouth and throat problems  R:     NEGATIVE for significant cough or SOB  CV:   NEGATIVE for chest pain, palpitations or peripheral edema  GI:     NEGATIVE for nausea, abdominal pain, heartburn, or change in bowel habits  :   NEGATIVE for frequency, dysuria, hematuria, vaginal discharge, or irregular bleeding  M:     NEGATIVE for significant arthralgias or myalgia  N:      " NEGATIVE for weakness, dizziness or paresthesias  E:      NEGATIVE for temperature intolerance, skin/hair changes  P:      NEGATIVE for changes in mood or affect.    EXAM:  BP 98/63   Pulse 61   Ht 1.651 m (5' 5\")   Wt 59.9 kg (132 lb)   SpO2 98%   BMI 21.97 kg/m     BMI: Body mass index is 21.97 kg/m .  Constitutional: healthy, alert and no distress  Head: Normocephalic. No masses, lesions, tenderness or abnormalities  Neck: Neck supple. Trachea midline. No adenopathy. Thyroid symmetric, normal size.   Cardiovascular: RRR.   Respiratory: Negative.   Breast: Breasts reveal mild symmetric fibrocystic densities, but there are no dominant, discrete, fixed or suspicious masses found.  Gastrointestinal: Abdomen soft, non-tender, non-distended. No masses, organomegaly.  :  Vulva:  No external lesions, normal female hair distribution, no inguinal adenopathy.    Urethra:  Midline, non-tender, well supported, no discharge  Vagina:  Moist, pink, no abnormal discharge, no lesions  Cervix visually normal.  IUD strings visualized  Rectal Exam: deferred  Musculoskeletal: extremities normal  Skin: no suspicious lesions or rashes  Psychiatric: Affect appropriate, cooperative,mentation appears normal.     COUNSELING:   Reviewed preventive health counseling, as reflected in patient instructions  Special attention given to:        Contraception       Family planning   reports that she has never smoked. She has never used smokeless tobacco.    Body mass index is 21.97 kg/m .    FRAX Risk Assessment    ASSESSMENT:  27 year old female with satisfactory annual exam  (Z00.00) Annual physical exam  (primary encounter diagnosis)  Comment: Pap due Jan 2025.  HM UTD.  Will consider what she wants to do with IUD (leave in place, switch for different, lower dose IUD, try LILLY in addition to IUD, etc.) and let me know.    RTC 1 year and prn.    Aarti Flowers MD    "

## 2023-05-01 ASSESSMENT — ENCOUNTER SYMPTOMS
COUGH: 0
ABDOMINAL PAIN: 0
PARESTHESIAS: 0
HEADACHES: 1
BREAST MASS: 0
CONSTIPATION: 0
HEMATOCHEZIA: 0
CHILLS: 0
SORE THROAT: 0
HEARTBURN: 0
FEVER: 0
DIZZINESS: 0
PALPITATIONS: 1
DYSURIA: 0
DIARRHEA: 0
JOINT SWELLING: 0
FREQUENCY: 0
SHORTNESS OF BREATH: 0
ARTHRALGIAS: 0
WEAKNESS: 0
EYE PAIN: 0
NAUSEA: 0
NERVOUS/ANXIOUS: 0
HEMATURIA: 0

## 2023-05-04 ENCOUNTER — OFFICE VISIT (OUTPATIENT)
Dept: FAMILY MEDICINE | Facility: CLINIC | Age: 28
End: 2023-05-04
Payer: COMMERCIAL

## 2023-05-04 VITALS
TEMPERATURE: 97.9 F | DIASTOLIC BLOOD PRESSURE: 66 MMHG | SYSTOLIC BLOOD PRESSURE: 101 MMHG | RESPIRATION RATE: 14 BRPM | BODY MASS INDEX: 21.58 KG/M2 | HEIGHT: 65 IN | OXYGEN SATURATION: 100 % | HEART RATE: 73 BPM | WEIGHT: 129.5 LBS

## 2023-05-04 DIAGNOSIS — Z13.1 SCREENING FOR DIABETES MELLITUS: ICD-10-CM

## 2023-05-04 DIAGNOSIS — Z13.0 SCREENING, ANEMIA, DEFICIENCY, IRON: ICD-10-CM

## 2023-05-04 DIAGNOSIS — M54.50 ACUTE BILATERAL LOW BACK PAIN WITHOUT SCIATICA: ICD-10-CM

## 2023-05-04 DIAGNOSIS — R00.2 PALPITATIONS: ICD-10-CM

## 2023-05-04 DIAGNOSIS — Z13.29 SCREENING FOR THYROID DISORDER: ICD-10-CM

## 2023-05-04 DIAGNOSIS — Z00.00 ROUTINE GENERAL MEDICAL EXAMINATION AT A HEALTH CARE FACILITY: Primary | ICD-10-CM

## 2023-05-04 PROBLEM — Z80.8 FAMILY HISTORY OF MELANOMA: Status: ACTIVE | Noted: 2017-03-30

## 2023-05-04 LAB
ALBUMIN SERPL BCG-MCNC: 4.5 G/DL (ref 3.5–5.2)
ALP SERPL-CCNC: 51 U/L (ref 35–104)
ALT SERPL W P-5'-P-CCNC: <5 U/L (ref 10–35)
ANION GAP SERPL CALCULATED.3IONS-SCNC: 11 MMOL/L (ref 7–15)
AST SERPL W P-5'-P-CCNC: 16 U/L (ref 10–35)
BASOPHILS # BLD AUTO: 0 10E3/UL (ref 0–0.2)
BASOPHILS NFR BLD AUTO: 0 %
BILIRUB SERPL-MCNC: 0.6 MG/DL
BUN SERPL-MCNC: 10.7 MG/DL (ref 6–20)
CALCIUM SERPL-MCNC: 9.5 MG/DL (ref 8.6–10)
CHLORIDE SERPL-SCNC: 105 MMOL/L (ref 98–107)
CREAT SERPL-MCNC: 0.69 MG/DL (ref 0.51–0.95)
DEPRECATED HCO3 PLAS-SCNC: 25 MMOL/L (ref 22–29)
EOSINOPHIL # BLD AUTO: 0.1 10E3/UL (ref 0–0.7)
EOSINOPHIL NFR BLD AUTO: 1 %
ERYTHROCYTE [DISTWIDTH] IN BLOOD BY AUTOMATED COUNT: 13.2 % (ref 10–15)
FERRITIN SERPL-MCNC: 30 NG/ML (ref 6–175)
GFR SERPL CREATININE-BSD FRML MDRD: >90 ML/MIN/1.73M2
GLUCOSE SERPL-MCNC: 80 MG/DL (ref 70–99)
HCT VFR BLD AUTO: 42.6 % (ref 35–47)
HGB BLD-MCNC: 14.4 G/DL (ref 11.7–15.7)
IMM GRANULOCYTES # BLD: 0 10E3/UL
IMM GRANULOCYTES NFR BLD: 0 %
IRON BINDING CAPACITY (ROCHE): 285 UG/DL (ref 240–430)
IRON SATN MFR SERPL: 41 % (ref 15–46)
IRON SERPL-MCNC: 117 UG/DL (ref 37–145)
LYMPHOCYTES # BLD AUTO: 1.7 10E3/UL (ref 0.8–5.3)
LYMPHOCYTES NFR BLD AUTO: 26 %
MCH RBC QN AUTO: 31.7 PG (ref 26.5–33)
MCHC RBC AUTO-ENTMCNC: 33.8 G/DL (ref 31.5–36.5)
MCV RBC AUTO: 94 FL (ref 78–100)
MONOCYTES # BLD AUTO: 0.5 10E3/UL (ref 0–1.3)
MONOCYTES NFR BLD AUTO: 7 %
NEUTROPHILS # BLD AUTO: 4.3 10E3/UL (ref 1.6–8.3)
NEUTROPHILS NFR BLD AUTO: 66 %
PLATELET # BLD AUTO: 197 10E3/UL (ref 150–450)
POTASSIUM SERPL-SCNC: 4.3 MMOL/L (ref 3.4–5.3)
PROT SERPL-MCNC: 6.6 G/DL (ref 6.4–8.3)
RBC # BLD AUTO: 4.54 10E6/UL (ref 3.8–5.2)
SODIUM SERPL-SCNC: 141 MMOL/L (ref 136–145)
TSH SERPL DL<=0.005 MIU/L-ACNC: 0.85 UIU/ML (ref 0.3–4.2)
WBC # BLD AUTO: 6.6 10E3/UL (ref 4–11)

## 2023-05-04 PROCEDURE — 80050 GENERAL HEALTH PANEL: CPT | Performed by: PHYSICIAN ASSISTANT

## 2023-05-04 PROCEDURE — 83540 ASSAY OF IRON: CPT | Performed by: PHYSICIAN ASSISTANT

## 2023-05-04 PROCEDURE — 82728 ASSAY OF FERRITIN: CPT | Performed by: PHYSICIAN ASSISTANT

## 2023-05-04 PROCEDURE — 83550 IRON BINDING TEST: CPT | Performed by: PHYSICIAN ASSISTANT

## 2023-05-04 PROCEDURE — 99385 PREV VISIT NEW AGE 18-39: CPT | Performed by: PHYSICIAN ASSISTANT

## 2023-05-04 PROCEDURE — 36415 COLL VENOUS BLD VENIPUNCTURE: CPT | Performed by: PHYSICIAN ASSISTANT

## 2023-05-04 ASSESSMENT — ENCOUNTER SYMPTOMS
FEVER: 0
JOINT SWELLING: 0
SORE THROAT: 0
ABDOMINAL PAIN: 0
DYSURIA: 0
SHORTNESS OF BREATH: 0
PARESTHESIAS: 0
ARTHRALGIAS: 0
BREAST MASS: 0
HEARTBURN: 0
NAUSEA: 0
WEAKNESS: 0
EYE PAIN: 0
CONSTIPATION: 0
PALPITATIONS: 1
CHILLS: 0
HEMATURIA: 0
HEMATOCHEZIA: 0
HEADACHES: 1
DIARRHEA: 0
COUGH: 0
DIZZINESS: 0
FREQUENCY: 0
NERVOUS/ANXIOUS: 0

## 2023-05-04 ASSESSMENT — PAIN SCALES - GENERAL: PAINLEVEL: MILD PAIN (3)

## 2023-05-04 NOTE — PROGRESS NOTES
SUBJECTIVE:   CC: Melody is an 27 year old who presents for preventive health visit.       5/4/2023     9:20 AM   Additional Questions   Roomed by Breann   Patient has been advised of split billing requirements and indicates understanding: Yes  Healthy Habits:     Getting at least 3 servings of Calcium per day:  NO    Bi-annual eye exam:  Yes    Dental care twice a year:  Yes    Sleep apnea or symptoms of sleep apnea:  None and Daytime drowsiness    Diet:  Gluten-free/reduced    Frequency of exercise:  2-3 days/week    Duration of exercise:  45-60 minutes    Taking medications regularly:  Yes    Medication side effects:  Not applicable    PHQ-2 Total Score: 0    Additional concerns today:  Yes    Patient would like to discuss worsening back pain - Has been ongoing for the past couple of years but has noticed that in the past 6-9 months, back pain has worsened    at worst - 8/10     Palpitation at night most evening with palpitations/fluttering    Today's PHQ-2 Score:       5/1/2023     6:17 PM   PHQ-2 ( 1999 [employer])   Q1: Little interest or pleasure in doing things 0   Q2: Feeling down, depressed or hopeless 0   PHQ-2 Score 0   Q1: Little interest or pleasure in doing things Not at all    Not at all   Q2: Feeling down, depressed or hopeless Not at all    Not at all   PHQ-2 Score 0    0       Have you ever done Advance Care Planning? (For example, a Health Directive, POLST, or a discussion with a medical provider or your loved ones about your wishes): No, advance care planning information given to patient to review.  Patient declined advance care planning discussion at this time.    Social History     Tobacco Use     Smoking status: Never     Smokeless tobacco: Never   Vaping Use     Vaping status: Not on file   Substance Use Topics     Alcohol use: Yes     Comment: socially           5/1/2023     6:17 PM   Alcohol Use   Prescreen: >3 drinks/day or >7 drinks/week? No          View : No data to display.               Reviewed orders with patient.  Reviewed health maintenance and updated orders accordingly - Yes  Labs reviewed in EPIC  BP Readings from Last 3 Encounters:   05/04/23 101/66   04/17/23 98/63   01/24/22 101/63    Wt Readings from Last 3 Encounters:   05/04/23 58.7 kg (129 lb 8 oz)   04/17/23 59.9 kg (132 lb)   01/24/22 57.6 kg (127 lb)                  Patient Active Problem List   Diagnosis     Celiac disease     Family history of melanoma     Past Surgical History:   Procedure Laterality Date     widsom teeth         Social History     Tobacco Use     Smoking status: Never     Smokeless tobacco: Never   Vaping Use     Vaping status: Not on file   Substance Use Topics     Alcohol use: Yes     Comment: socially     Family History   Problem Relation Age of Onset     Dementia Mother      Anxiety Disorder Mother      Prostate Cancer Father      Glaucoma Maternal Grandmother      Hypertension Maternal Grandfather      Hyperlipidemia Maternal Grandfather      Blood Disease Maternal Grandfather      Cancer Maternal Grandfather      Colon Cancer Maternal Grandfather      Cancer Paternal Grandmother      Melanoma Other      Coronary Artery Disease Other         PGFA     Diabetes Other         PGFA         Current Outpatient Medications   Medication Sig Dispense Refill     calcium carbonate-vitamin D (OS-LIN) 500-400 MG-UNIT tablet Take 1 tablet by mouth daily       levonorgestrel (MIRENA) 20 MCG/24HR IUD 1 each by Intrauterine route once       Allergies   Allergen Reactions     Gluten Meal GI Disturbance and Other (See Comments)     Chlorhexidine Itching     Tegaderm Transparent Dressing (Informational Only) Itching     No lab results found.     Breast Cancer Screening:    FHS-7:       5/1/2023     6:20 PM   Breast CA Risk Assessment (FHS-7)   Did any of your first-degree relatives have breast or ovarian cancer? No   Did any of your relatives have bilateral breast cancer? No   Did any man in your family have breast  cancer? No   Did any woman in your family have breast and ovarian cancer? No   Did any woman in your family have breast cancer before age 50 y? No   Do you have 2 or more relatives with breast and/or ovarian cancer? No   Do you have 2 or more relatives with breast and/or bowel cancer? No     Patient under 40 years of age: Routine Mammogram Screening not recommended.   Pertinent mammograms are reviewed under the imaging tab.    History of abnormal Pap smear: NO - age 21-29 PAP every 3 years recommended  NO - age 30- 65 PAP every 3 years recommended      1/24/2022     9:59 AM 1/23/2019     5:23 PM   PAP / HPV   PAP Negative for Intraepithelial Lesion or Malignancy (NILM)      PAP (Historical)  NIL       Reviewed and updated as needed this visit by clinical staff   Tobacco  Allergies  Meds  Problems  Med Hx  Surg Hx  Fam Hx          Reviewed and updated as needed this visit by Provider   Tobacco  Allergies  Meds  Problems  Med Hx  Surg Hx  Fam Hx           History reviewed. No pertinent past medical history.   Past Surgical History:   Procedure Laterality Date     widsom teeth         Review of Systems   Constitutional: Negative for chills and fever.   HENT: Negative for congestion, ear pain, hearing loss and sore throat.    Eyes: Negative for pain and visual disturbance.   Respiratory: Negative for cough and shortness of breath.    Cardiovascular: Positive for palpitations. Negative for chest pain and peripheral edema.   Gastrointestinal: Negative for abdominal pain, constipation, diarrhea, heartburn, hematochezia and nausea.   Breasts:  Negative for tenderness, breast mass and discharge.   Genitourinary: Negative for dysuria, frequency, genital sores, hematuria, pelvic pain, urgency, vaginal bleeding and vaginal discharge.   Musculoskeletal: Negative for arthralgias and joint swelling.   Skin: Negative for rash.   Neurological: Positive for headaches. Negative for dizziness, weakness and paresthesias.  "  Psychiatric/Behavioral: Negative for mood changes. The patient is not nervous/anxious.         OBJECTIVE:   /66 (BP Location: Left arm, Patient Position: Sitting, Cuff Size: Adult Regular)   Pulse 73   Temp 97.9  F (36.6  C) (Temporal)   Resp 14   Ht 1.654 m (5' 5.1\")   Wt 58.7 kg (129 lb 8 oz)   SpO2 100%   BMI 21.48 kg/m    Physical Exam  GENERAL: healthy, alert and no distress  EYES: Eyes grossly normal to inspection, PERRL and conjunctivae and sclerae normal  HENT: ear canals and TM's normal, nose and mouth without ulcers or lesions  NECK: no adenopathy, no asymmetry, masses, or scars and thyroid normal to palpation  RESP: lungs clear to auscultation - no rales, rhonchi or wheezes  CV: regular rate and rhythm, normal S1 S2, no S3 or S4, no murmur, click or rub, no peripheral edema and peripheral pulses strong  MS: no gross musculoskeletal defects noted, no edema  SKIN: no suspicious lesions or rashes  NEURO: Normal strength and tone, mentation intact and speech normal  PSYCH: mentation appears normal, affect normal/bright    Diagnostic Test Results:  Labs reviewed in Epic    ASSESSMENT/PLAN:       ICD-10-CM    1. Routine general medical examination at a health care facility  Z00.00       2. Acute bilateral low back pain without sciatica  M54.50 Physical Therapy Referral      3. Palpitations  R00.2       4. Screening, anemia, deficiency, iron  Z13.0 CBC with Platelets & Differential     Iron & Iron Binding Capacity     Ferritin     CBC with Platelets & Differential     Iron & Iron Binding Capacity     Ferritin      5. Screening for thyroid disorder  Z13.29 TSH with free T4 reflex     TSH with free T4 reflex      6. Screening for diabetes mellitus  Z13.1 Comprehensive metabolic panel     Comprehensive metabolic panel          Patient has been advised of split billing requirements and indicates understanding: Yes      COUNSELING:  Reviewed preventive health counseling, as reflected in patient " instructions       Regular exercise       Healthy diet/nutrition        She reports that she has never smoked. She has never used smokeless tobacco.          GRACE Rees [facility]

## 2023-05-04 NOTE — RESULT ENCOUNTER NOTE
"Lainey Oliver  Your attached labs are normal. Keep up the good work!  Please contact the office with any questions or concerns.    Amy Khan \"Adryan\" GRACE Harman    "

## 2023-05-05 ENCOUNTER — MYC MEDICAL ADVICE (OUTPATIENT)
Dept: FAMILY MEDICINE | Facility: CLINIC | Age: 28
End: 2023-05-05
Payer: COMMERCIAL

## 2023-05-05 DIAGNOSIS — R00.2 PALPITATIONS: Primary | ICD-10-CM

## 2023-05-11 ENCOUNTER — MYC MEDICAL ADVICE (OUTPATIENT)
Dept: FAMILY MEDICINE | Facility: CLINIC | Age: 28
End: 2023-05-11

## 2023-05-11 ENCOUNTER — THERAPY VISIT (OUTPATIENT)
Dept: PHYSICAL THERAPY | Facility: CLINIC | Age: 28
End: 2023-05-11
Attending: PHYSICIAN ASSISTANT
Payer: COMMERCIAL

## 2023-05-11 DIAGNOSIS — M54.50 ACUTE BILATERAL LOW BACK PAIN WITHOUT SCIATICA: ICD-10-CM

## 2023-05-11 DIAGNOSIS — M54.9 UPPER BACK PAIN: ICD-10-CM

## 2023-05-11 DIAGNOSIS — M54.89 OTHER ACUTE BACK PAIN: Primary | ICD-10-CM

## 2023-05-11 PROCEDURE — 97161 PT EVAL LOW COMPLEX 20 MIN: CPT | Mod: GP | Performed by: PHYSICAL THERAPIST

## 2023-05-11 PROCEDURE — 97112 NEUROMUSCULAR REEDUCATION: CPT | Mod: GP | Performed by: PHYSICAL THERAPIST

## 2023-05-11 NOTE — PROGRESS NOTES
Physical Therapy Initial Evaluation  Subjective:    Patient Health History  Symptom: May 2022.          Pain is reported as 2/10 on pain scale.  General health as reported by patient is good.   Other medical history details: Celiac disease, intermittent PVCs (heart).     Medical allergies: adhesives (tegrader ).   Surgeries include:  None.    Current medications:  None.    Current occupation is Registered Nurse.   Primary job tasks include:  Lifting/carrying, prolonged standing, pushing/pulling and repetitive tasks.                  Therapist Generated HPI Evaluation         Type of problem:  Lumbar.      Condition occurred with:  Insidious onset.  Where condition occurred: for unknown reasons.  Patient reports pain:  Lumbar spine left and lumbar spine right.  Pain is described as aching (2/10) and is intermittent.  Pain radiates to:  No radiation. Pain is worse during the day.  Since onset symptoms are unchanged.  Associated with: none. Symptoms are exacerbated by sitting and bending  Relieved by: rest, heat   Imaging testing: none.  There was none improvement following previous treatment.  Restrictions due to condition include:  Working in normal job without restrictions ([employer]- 12 hour day).  Barriers include:  None as reported by patient.                        Objective:  System    Physical Exam    General     ROS  Mmt: gluteus medius: 3+/5 (B), hip extensors: 4+/5 (L), 5-/5 (R), poor gluteus susan contraction left and good right, posterior right PSIS, left FRS L-L5, (B) lumbar erector spinae muscle tightness, no pain provocation with spring testing L1-L5, increased lumbar lordosis with genu recurvatum in standing, faulty abdominal recruitment pattern with compensatory upper and lower abdominal muscle bulging   Assessment/Plan:    Patient is a 27 year old female with lumbar complaints.    Patient has the following significant findings with corresponding treatment plan.                Diagnosis 1:  Acute  bilateral low back pain without sciatica   Pain -  hot/cold therapy, self management, education, directional preference exercise and home program  Decreased strength - therapeutic exercise, therapeutic activities and home program  Impaired muscle performance - neuro re-education and home program  Decreased function - therapeutic activities and home program  Instability -  Therapeutic Activity  Therapeutic Exercise  Neuromuscular Re-education  home program    Therapy Evaluation Codes:     1) Clinical presentation characteristics are:   Stable/Uncomplicated.  2) Decision-Making    Low complexity using standardized patient assessment instrument and/or measureable assessment of functional outcome.  Cumulative Therapy Evaluation is: Low complexity.    Previous and current functional limitations:  (See Goal Flow Sheet for this information)    Short term and Long term goals: (See Goal Flow Sheet for this information)     Communication ability:  Patient appears to be able to clearly communicate and understand verbal and written communication and follow directions correctly.  Treatment Explanation - The following has been discussed with the patient:   RX ordered/plan of care  Anticipated outcomes  Possible risks and side effects  This patient would benefit from PT intervention to resume normal activities.   Rehab potential is good.    Frequency:  1 X week, once daily  Duration:  for 6 weeks  Discharge Plan:  Achieve all LTG.  Independent in home treatment program.  Reach maximal therapeutic benefit.    Please refer to the daily flowsheet for treatment today, total treatment time and time spent performing 1:1 timed codes.

## 2023-05-16 NOTE — TELEPHONE ENCOUNTER
MP,  Please see below [phone] message and advise.  Pended PT order if approved (not sure which upper back diagnosis to pick)  Thanks,  Lisa ELIZABETH RN

## 2023-05-22 ENCOUNTER — THERAPY VISIT (OUTPATIENT)
Dept: PHYSICAL THERAPY | Facility: CLINIC | Age: 28
End: 2023-05-22
Attending: PHYSICIAN ASSISTANT
Payer: COMMERCIAL

## 2023-05-22 DIAGNOSIS — M54.9 UPPER BACK PAIN: ICD-10-CM

## 2023-05-22 PROBLEM — M54.50 ACUTE LOW BACK PAIN WITHOUT SCIATICA: Status: ACTIVE | Noted: 2023-05-22

## 2023-05-22 PROCEDURE — 97161 PT EVAL LOW COMPLEX 20 MIN: CPT | Mod: GP | Performed by: PHYSICAL THERAPIST

## 2023-05-22 PROCEDURE — 97112 NEUROMUSCULAR REEDUCATION: CPT | Mod: GP | Performed by: PHYSICAL THERAPIST

## 2023-05-22 NOTE — PROGRESS NOTES
PHYSICAL THERAPY EVALUATION  Type of Visit: Evaluation    See electronic medical record for Abuse and Falls Screening details.    Subjective      Presenting condition or subjective complaint: central upper pain  Date of onset: 03/01/22    Relevant medical history: -- (good, celiac)   Dates & types of surgery: gen silva    Prior diagnostic imaging/testing results: -- (none) DEXA SCAN- unremarkable   Prior therapy history for the same diagnosis, illness or injury: No      Prior Level of Function   Transfers: Independent  Ambulation: Independent  ADL: Independent  IADL: WFLS    Living Environment  Social support: With a significant other or spouse   Type of home: Apartment/condo   Stairs to enter the home: Yes 20 Is there a railing: Yes   Ramp: No   Stairs inside the home: No 0 Is there a railing: No   Help at home: None  Equipment owned:       Employment: Yes RN  Hobbies/Interests: RUN    Patient goals for therapy: walking pain free    Pain assessment: Pain present  rates pain  2-8/10, central upper back, radation (B) low back, (B) occiput, achey and cramping      Objective   CERVICAL SPINE EVALUATION  PAIN: Pain Level at Rest: 2/10, activity: 2-8/10   INTEGUMENTARY (edema, incisions): NA  POSTURE: Standing Posture: Rounded shoulders, Lordosis decreased,  right elevated scapula  GAIT:   Weightbearing Status: FWB  Assistive Device(s): None  Gait Deviations: WNL  Balance/Proprioception: NA  Weight Bearing Alignment: NA  ROM:   (Degrees) Left AROM Right AROM    Cervical Flexion WNL    Cervical Extension WNL with central neck pain     Cervical Side bend WNL WNL    Cervical Rotation WNL WNL    Cervical Protrusion     Cervical Retraction WNL     Thoracic Flexion WNL with central Upper back pain with radiation to side     Thoracic Extension     Thoracic Rotation 45% 50%     Left AROM Left PROM Right AROM Right PROM   Shoulder Flexion       Shoulder Extension       Shoulder Abduction       Shoulder Adduction       Shoulder  IR       Shoulder ER       Shoulder Horiz Abduction       Shoulder Horiz Adduction             PMYOTOMES: NA  DTR S: NA  CORD SIGNS: NA  DERMATOMES: NA    NEURAL TENSION:   FLEXIBILITY: NA   Special Tests: NA  PALPATION:  Left rhomboid  and left greater than right thoracic erector spinae muscle tightness   SPINAL SEGMENTAL CONCLUSIONS: Decreased OA mobility, minimal loss of mid cervical/ upper to mid  thoracic mobility (B)      Strength: middle trapezius: 3+/5 (B), rhomboids: 4/5 (L), 4+/5 (R), no pain provocation noted with scapular retraction and depression       Assessment & Plan   CLINICAL IMPRESSIONS   Medical Diagnosis: upper back pain    Treatment Diagnosis: postural dysfunction   Impression/Assessment: Patient is a 27 year old female with  Central upper back complaints.  The following significant findings have been identified: Pain, Decreased ROM/flexibility, Decreased joint mobility, Decreased strength, Impaired muscle performance and Impaired posture. These impairments interfere with their ability to perform walking as compared to previous level of function.     Clinical Decision Making (Complexity):   Clinical Presentation: Stable/Uncomplicated  Clinical Presentation Rationale: based on medical and personal factors listed in PT evaluation  Clinical Decision Making (Complexity): Low complexity    PLAN OF CARE  Treatment Interventions:  Modalities:  heat and ice    Long Term Goals     PT Goal 1  Goal Identifier: Walking  Goal Description: Walking 1 1/2 hours with pain level 5/10 and goal to walk 1 1/2 hours without pain  Rationale:  (pain free walking)  Target Date: 06/29/23      Frequency of Treatment: one time per week  Duration of Treatment: 8 week    Recommended Referrals to Other Professionals:  None  Education Assessment:        Risks and benefits of evaluation/treatment have been explained.   Patient/Family/caregiver agrees with Plan of Care.     Evaluation Time:            Signing Clinician:  Carolyn Montiel, PT

## 2023-06-01 ENCOUNTER — THERAPY VISIT (OUTPATIENT)
Dept: PHYSICAL THERAPY | Facility: CLINIC | Age: 28
End: 2023-06-01
Attending: PHYSICIAN ASSISTANT
Payer: COMMERCIAL

## 2023-06-01 DIAGNOSIS — M54.9 UPPER BACK PAIN: Primary | ICD-10-CM

## 2023-06-01 DIAGNOSIS — M62.81 MUSCLE WEAKNESS (GENERALIZED): ICD-10-CM

## 2023-06-01 PROCEDURE — 97530 THERAPEUTIC ACTIVITIES: CPT | Mod: GP | Performed by: PHYSICAL THERAPIST

## 2023-06-01 PROCEDURE — 97112 NEUROMUSCULAR REEDUCATION: CPT | Mod: GP | Performed by: PHYSICAL THERAPIST

## 2023-06-20 ENCOUNTER — OFFICE VISIT (OUTPATIENT)
Dept: CARDIOLOGY | Facility: CLINIC | Age: 28
End: 2023-06-20
Attending: PHYSICIAN ASSISTANT
Payer: COMMERCIAL

## 2023-06-20 VITALS
OXYGEN SATURATION: 98 % | BODY MASS INDEX: 21.41 KG/M2 | SYSTOLIC BLOOD PRESSURE: 103 MMHG | HEIGHT: 65 IN | DIASTOLIC BLOOD PRESSURE: 66 MMHG | WEIGHT: 128.5 LBS | HEART RATE: 69 BPM

## 2023-06-20 DIAGNOSIS — R00.2 PALPITATIONS: ICD-10-CM

## 2023-06-20 PROCEDURE — G0463 HOSPITAL OUTPT CLINIC VISIT: HCPCS | Performed by: INTERNAL MEDICINE

## 2023-06-20 PROCEDURE — 99204 OFFICE O/P NEW MOD 45 MIN: CPT | Performed by: INTERNAL MEDICINE

## 2023-06-20 ASSESSMENT — PAIN SCALES - GENERAL: PAINLEVEL: NO PAIN (0)

## 2023-06-20 NOTE — PROGRESS NOTES
SUBJECTIVE:  Melody Cash is a 27 year old female who presents for evaluation of palpitations.  Patient is a nurse at pediatric ICU cardiology.  A month ago had recurrent skipped beats for 3 weeks.  As patient was in the ICU put herself on the monitor and noticed that every fourth beat was a PVC.  No fast arrhythmia or other symptoms.  Patient has no symptoms though becomes anxious with this ectopy.  No family history of arrhythmia.  No excess alcohol caffeine or caffeinated drinks.  Patient has no significant cardiac risk factors.  Only other diagnosis is celiac disease.  Patient had similar symptoms in 2017 and was evaluated.  Since past 1 month all her symptoms subsided.    Patient Active Problem List    Diagnosis Date Noted     Muscle weakness (generalized) 06/01/2023     Priority: Medium     Acute low back pain without sciatica 05/22/2023     Priority: Medium     Upper back pain 05/22/2023     Priority: Medium     Family history of melanoma 03/30/2017     Priority: Medium     Formatting of this note might be different from the original.  Paternal uncle       Celiac disease 11/29/2013     Priority: Medium     Formatting of this note might be different from the original.  Small bowel biopsy markedly positive. Tissue transglutaminase antibody positive; skin testing negative      .  Current Outpatient Medications   Medication Sig     calcium carbonate-vitamin D (OS-LIN) 500-400 MG-UNIT tablet Take 1 tablet by mouth daily     levonorgestrel (MIRENA) 20 MCG/24HR IUD 1 each by Intrauterine route once     No current facility-administered medications for this visit.     No past medical history on file.  Past Surgical History:   Procedure Laterality Date     widsom teeth       Allergies   Allergen Reactions     Gluten Meal GI Disturbance and Other (See Comments)     Chlorhexidine Itching     Tegaderm Transparent Dressing (Informational Only) Itching     Social History     Socioeconomic History     Marital status:  "Single     Spouse name: Not on file     Number of children: Not on file     Years of education: Not on file     Highest education level: Not on file   Occupational History     Not on file   Tobacco Use     Smoking status: Never     Smokeless tobacco: Never   Vaping Use     Vaping status: Not on file   Substance and Sexual Activity     Alcohol use: Yes     Comment: socially     Drug use: No     Sexual activity: Yes     Partners: Male     Birth control/protection: Condom, I.U.D.   Other Topics Concern     Parent/sibling w/ CABG, MI or angioplasty before 65F 55M? No   Social History Narrative     Not on file     Social Determinants of Health     Financial Resource Strain: Not on file   Food Insecurity: Not on file   Transportation Needs: Not on file   Physical Activity: Not on file   Stress: Not on file   Social Connections: Not on file   Intimate Partner Violence: Not on file   Housing Stability: Not on file     Family History   Problem Relation Age of Onset     Dementia Mother      Anxiety Disorder Mother      Prostate Cancer Father      Glaucoma Maternal Grandmother      Hypertension Maternal Grandfather      Hyperlipidemia Maternal Grandfather      Blood Disease Maternal Grandfather      Cancer Maternal Grandfather      Colon Cancer Maternal Grandfather      Cancer Paternal Grandmother      Melanoma Other      Coronary Artery Disease Other         PGFA     Diabetes Other         PGFA          REVIEW OF SYSTEMS:  General: negative, fever, chills, night sweats  Skin: negative, acne, rash and scaling  Eyes: negative, double vision, eye pain and photophobia  Ears/Nose/Throat: negative, nasal congestion and purulent rhinorrhea  Respiratory: No dyspnea on exertion, No cough, No hemoptysis and negative  Cardiovascular: negative, chest pain, exertional chest pain or pressure, paroxysmal nocturnal dyspnea, dyspnea on exertion and orthopnea         OBJECTIVE:  Blood pressure 103/66, pulse 69, height 1.651 m (5' 5\"), weight " 58.3 kg (128 lb 8 oz), SpO2 98 %, not currently breastfeeding.  General Appearance: healthy, alert, active and no distress  Head: Normocephalic. No masses, lesions, tenderness or abnormalities  Eyes: conjuctiva clear, PERRL, EOM intact  Ears: External ears normal. Canals clear. TM's normal.  Nose: Nares normal  Mouth: normal  Neck: Supple, no cervical adenopathy, no thyromegaly  Lungs: clear to auscultation  Cardiac: regular rate and rhythm, normal S1 and S2, no murmur       ASSESSMENT/PLAN:  Patient here for evaluation of palpitations.  A month ago patient had intermittent skipped beats for about 3 weeks.  She is an ICU/pediatric cardiology nurse.  She reported herself on the monitor and found to have PVCs every fourth beat.  No history of tachyarrhythmia.  No family history of arrhythmia.  No excess alcohol caffeine caffeinated drinks.  She has no significant cardiac risk factors.  Only other diagnosis of celiac disease.  As patient was having PVCs she checked her basic metabolic panel as well as TSH.  All were normal.  Patient had similar symptoms in the past.  She was evaluated in 2017 for palpitations.  Her EKG was normal apart from sinus bradycardia.  Patient had a Zio patch which showed less than 1% PVCs and PACs and no other significant arrhythmia.  Symptoms correlated with ectopy.  In 2018 patient had an exercise stress echo which was completely normal.  As patient has no symptoms at this time repeat monitor may not be nonconclusive.  We will plan for an echocardiogram and patient will be contacted with the test result.  If symptoms recur patient will contact through [employer].  Total visit duration 45 minutes.  This included face-to-face interview, physical exam, chart review, review of care everywhere including prior EKG Zio patch stress echo, monitor tracing brought by patient and documentation.

## 2023-06-20 NOTE — LETTER
6/20/2023      RE: Melody Cash  [address]  [facility] [address]       Dear Colleague,    Thank you for the opportunity to participate in the care of your patient, Melody Cash, at the [facility] [place] [facility]. Please see a copy of my visit note below.       SUBJECTIVE:  Melody Cash is a 27 year old female who presents for evaluation of palpitations.  Patient is a nurse at pediatric ICU cardiology.  A month ago had recurrent skipped beats for 3 weeks.  As patient was in the ICU put herself on the monitor and noticed that every fourth beat was a PVC.  No fast arrhythmia or other symptoms.  Patient has no symptoms though becomes anxious with this ectopy.  No family history of arrhythmia.  No excess alcohol caffeine or caffeinated drinks.  Patient has no significant cardiac risk factors.  Only other diagnosis is celiac disease.  Patient had similar symptoms in 2017 and was evaluated.  Since past 1 month all her symptoms subsided.    Patient Active Problem List    Diagnosis Date Noted    Muscle weakness (generalized) 06/01/2023     Priority: Medium    Acute low back pain without sciatica 05/22/2023     Priority: Medium    Upper back pain 05/22/2023     Priority: Medium    Family history of melanoma 03/30/2017     Priority: Medium     Formatting of this note might be different from the original.  Paternal uncle      Celiac disease 11/29/2013     Priority: Medium     Formatting of this note might be different from the original.  Small bowel biopsy markedly positive. Tissue transglutaminase antibody positive; skin testing negative      .  Current Outpatient Medications   Medication Sig    calcium carbonate-vitamin D (OS-LIN) 500-400 MG-UNIT tablet Take 1 tablet by mouth daily    levonorgestrel (MIRENA) 20 MCG/24HR IUD 1 each by Intrauterine route once     No current facility-administered medications for this visit.     No past medical  history on file.  Past Surgical History:   Procedure Laterality Date    widsom teeth       Allergies   Allergen Reactions    Gluten Meal GI Disturbance and Other (See Comments)    Chlorhexidine Itching    Tegaderm Transparent Dressing (Informational Only) Itching     Social History     Socioeconomic History    Marital status: Single     Spouse name: Not on file    Number of children: Not on file    Years of education: Not on file    Highest education level: Not on file   Occupational History    Not on file   Tobacco Use    Smoking status: Never    Smokeless tobacco: Never   Vaping Use    Vaping status: Not on file   Substance and Sexual Activity    Alcohol use: Yes     Comment: socially    Drug use: No    Sexual activity: Yes     Partners: Male     Birth control/protection: Condom, I.U.D.   Other Topics Concern    Parent/sibling w/ CABG, MI or angioplasty before 65F 55M? No   Social History Narrative    Not on file     Social Determinants of Health     Financial Resource Strain: Not on file   Food Insecurity: Not on file   Transportation Needs: Not on file   Physical Activity: Not on file   Stress: Not on file   Social Connections: Not on file   Intimate Partner Violence: Not on file   Housing Stability: Not on file     Family History   Problem Relation Age of Onset    Dementia Mother     Anxiety Disorder Mother     Prostate Cancer Father     Glaucoma Maternal Grandmother     Hypertension Maternal Grandfather     Hyperlipidemia Maternal Grandfather     Blood Disease Maternal Grandfather     Cancer Maternal Grandfather     Colon Cancer Maternal Grandfather     Cancer Paternal Grandmother     Melanoma Other     Coronary Artery Disease Other         PGFA    Diabetes Other         PGFA          REVIEW OF SYSTEMS:  General: negative, fever, chills, night sweats  Skin: negative, acne, rash and scaling  Eyes: negative, double vision, eye pain and photophobia  Ears/Nose/Throat: negative, nasal congestion and purulent  "rhinorrhea  Respiratory: No dyspnea on exertion, No cough, No hemoptysis and negative  Cardiovascular: negative, chest pain, exertional chest pain or pressure, paroxysmal nocturnal dyspnea, dyspnea on exertion and orthopnea         OBJECTIVE:  Blood pressure 103/66, pulse 69, height 1.651 m (5' 5\"), weight 58.3 kg (128 lb 8 oz), SpO2 98 %, not currently breastfeeding.  General Appearance: healthy, alert, active and no distress  Head: Normocephalic. No masses, lesions, tenderness or abnormalities  Eyes: conjuctiva clear, PERRL, EOM intact  Ears: External ears normal. Canals clear. TM's normal.  Nose: Nares normal  Mouth: normal  Neck: Supple, no cervical adenopathy, no thyromegaly  Lungs: clear to auscultation  Cardiac: regular rate and rhythm, normal S1 and S2, no murmur       ASSESSMENT/PLAN:  Patient here for evaluation of palpitations.  A month ago patient had intermittent skipped beats for about 3 weeks.  She is an ICU/pediatric cardiology nurse.  She reported herself on the monitor and found to have PVCs every fourth beat.  No history of tachyarrhythmia.  No family history of arrhythmia.  No excess alcohol caffeine caffeinated drinks.  She has no significant cardiac risk factors.  Only other diagnosis of celiac disease.  As patient was having PVCs she checked her basic metabolic panel as well as TSH.  All were normal.  Patient had similar symptoms in the past.  She was evaluated in 2017 for palpitations.  Her EKG was normal apart from sinus bradycardia.  Patient had a Zio patch which showed less than 1% PVCs and PACs and no other significant arrhythmia.  Symptoms correlated with ectopy.  In 2018 patient had an exercise stress echo which was completely normal.  As patient has no symptoms at this time repeat monitor may not be nonconclusive.  We will plan for an echocardiogram and patient will be contacted with the test result.  If symptoms recur patient will contact through [employer].  Total visit duration 45 " minutes.  This included face-to-face interview, physical exam, chart review, review of care everywhere including prior EKG Zio patch stress echo, monitor tracing brought by patient and documentation.      Please do not hesitate to contact me if you have any questions/concerns.     Sincerely,     RUI Graves MD

## 2023-06-20 NOTE — NURSING NOTE
Chief Complaint   Patient presents with     Follow Up     referral from PCP Ghazal          Vitals were taken and medications reconciled.     Caryn Chauhan, Visit Facilitator    7:54 AM

## 2023-06-20 NOTE — PATIENT INSTRUCTIONS
Complete an echocardiogram.  As soon as results are compiled and reviewed, you will be notified.

## 2023-07-03 ENCOUNTER — THERAPY VISIT (OUTPATIENT)
Dept: PHYSICAL THERAPY | Facility: CLINIC | Age: 28
End: 2023-07-03
Payer: COMMERCIAL

## 2023-07-03 ENCOUNTER — ANCILLARY PROCEDURE (OUTPATIENT)
Dept: CARDIOLOGY | Facility: CLINIC | Age: 28
End: 2023-07-03
Attending: INTERNAL MEDICINE
Payer: COMMERCIAL

## 2023-07-03 DIAGNOSIS — R00.2 PALPITATIONS: ICD-10-CM

## 2023-07-03 DIAGNOSIS — M54.9 UPPER BACK PAIN: ICD-10-CM

## 2023-07-03 DIAGNOSIS — M62.81 MUSCLE WEAKNESS (GENERALIZED): ICD-10-CM

## 2023-07-03 DIAGNOSIS — M54.50 ACUTE LOW BACK PAIN WITHOUT SCIATICA: Primary | ICD-10-CM

## 2023-07-03 LAB — LVEF ECHO: NORMAL

## 2023-07-03 PROCEDURE — 97110 THERAPEUTIC EXERCISES: CPT | Mod: GP | Performed by: PHYSICAL THERAPIST

## 2023-07-03 PROCEDURE — 93306 TTE W/DOPPLER COMPLETE: CPT | Performed by: INTERNAL MEDICINE

## 2023-07-25 ENCOUNTER — THERAPY VISIT (OUTPATIENT)
Dept: PHYSICAL THERAPY | Facility: CLINIC | Age: 28
End: 2023-07-25
Payer: COMMERCIAL

## 2023-07-25 DIAGNOSIS — M62.81 MUSCLE WEAKNESS (GENERALIZED): ICD-10-CM

## 2023-07-25 DIAGNOSIS — M54.9 UPPER BACK PAIN: Primary | ICD-10-CM

## 2023-07-25 PROCEDURE — 97530 THERAPEUTIC ACTIVITIES: CPT | Mod: GP | Performed by: PHYSICAL THERAPIST

## 2023-07-25 PROCEDURE — 97112 NEUROMUSCULAR REEDUCATION: CPT | Mod: GP | Performed by: PHYSICAL THERAPIST

## 2023-07-27 ENCOUNTER — TRANSFERRED RECORDS (OUTPATIENT)
Dept: HEALTH INFORMATION MANAGEMENT | Facility: CLINIC | Age: 28
End: 2023-07-27
Payer: COMMERCIAL

## 2024-01-24 ENCOUNTER — MYC MEDICAL ADVICE (OUTPATIENT)
Dept: FAMILY MEDICINE | Facility: CLINIC | Age: 29
End: 2024-01-24
Payer: COMMERCIAL

## 2024-03-14 ENCOUNTER — OFFICE VISIT (OUTPATIENT)
Dept: OBGYN | Facility: CLINIC | Age: 29
End: 2024-03-14
Payer: COMMERCIAL

## 2024-03-14 VITALS
OXYGEN SATURATION: 98 % | WEIGHT: 131 LBS | SYSTOLIC BLOOD PRESSURE: 102 MMHG | HEART RATE: 98 BPM | DIASTOLIC BLOOD PRESSURE: 58 MMHG | HEIGHT: 65 IN | BODY MASS INDEX: 21.83 KG/M2

## 2024-03-14 DIAGNOSIS — Z00.00 ANNUAL PHYSICAL EXAM: Primary | ICD-10-CM

## 2024-03-14 DIAGNOSIS — Z30.432 ENCOUNTER FOR IUD REMOVAL: ICD-10-CM

## 2024-03-14 PROCEDURE — 99395 PREV VISIT EST AGE 18-39: CPT | Mod: 25 | Performed by: OBSTETRICS & GYNECOLOGY

## 2024-03-14 PROCEDURE — 58301 REMOVE INTRAUTERINE DEVICE: CPT | Performed by: OBSTETRICS & GYNECOLOGY

## 2024-03-14 NOTE — PROGRESS NOTES
"Melody is a 28 year old  female who presents for annual exam.     Menses are rare and variable lasting  0  days.  Menses flow: light.  No LMP recorded. (Menstrual status: IUD).. Using IUD for contraception.  She is not currently considering pregnancy.  Besides routine health maintenance, she has no other health concerns today.  Getting  at [employer] in May.  Planning pregnancy in .  Would like IUD removed today.  GYNECOLOGIC HISTORY:  Menarche: 12  Age at first intercourse: 18 Number of lifetime partners: less than 6  Melody is sexually active with 1male partner(s) and is currently in monogamous relationship with deyanira.    History sexually transmitted infections:No STD history  STI testing offered?  Declined  SIDNEY exposure: No  History of abnormal Pap smear: NO - age 30- 65 PAP every 3 years recommended  Family history of breast CA: No  Family history of uterine/ovarian CA: No    Family history of colon CA: Yes (Please explain): m grandmother    HEALTH MAINTENANCE:  Cholesterol: (No results found for: \"CHOL\" History of abnormal lipids: No  Mammo:  . History of abnormal Mammo: Not applicable.  Regular Self Breast Exams: Yes  Calcium/Vitamin D intake: source:  dairy, dietary supplement(s) Adequate? Yes  TSH: (  TSH   Date Value Ref Range Status   2023 0.85 0.30 - 4.20 uIU/mL Final    )  Pap; (  Lab Results   Component Value Date    PAP NIL 2019    )    HISTORY:  OB History    Para Term  AB Living   0 0 0 0 0 0   SAB IAB Ectopic Multiple Live Births   0 0 0 0 0     No past medical history on file.  Past Surgical History:   Procedure Laterality Date    widsom teeth       Family History   Problem Relation Age of Onset    Dementia Mother     Anxiety Disorder Mother     Alzheimer Disease Mother     Frontotemporal dementia Mother     Prostate Cancer Father     Glaucoma Maternal Grandmother     Hypertension Maternal Grandfather     Hyperlipidemia Maternal Grandfather     " Blood Disease Maternal Grandfather     Cancer Maternal Grandfather     Colon Cancer Maternal Grandfather     Cancer Paternal Grandmother     Melanoma Other     Coronary Artery Disease Other         PGFA    Diabetes Other         PGFA     Social History     Socioeconomic History    Marital status: Single     Spouse name: None    Number of children: None    Years of education: None    Highest education level: None   Tobacco Use    Smoking status: Never    Smokeless tobacco: Never   Substance and Sexual Activity    Alcohol use: Yes     Comment: socially    Drug use: No    Sexual activity: Yes     Partners: Male     Birth control/protection: Condom, I.U.D.   Other Topics Concern    Parent/sibling w/ CABG, MI or angioplasty before 65F 55M? No       Current Outpatient Medications:     calcium carbonate-vitamin D (OS-LIN) 500-400 MG-UNIT tablet, Take 1 tablet by mouth daily, Disp: , Rfl:     levonorgestrel (MIRENA) 20 MCG/24HR IUD, 1 each by Intrauterine route once, Disp: , Rfl:   No current facility-administered medications for this visit.    Facility-Administered Medications Ordered in Other Visits:     perflutren diluted 1mL to 2mL with saline (OPTISON) diluted injection 6 mL, 6 mL, Intravenous, Once, RUI Graves MD     Allergies   Allergen Reactions    Gluten Meal GI Disturbance and Other (See Comments)    Chlorhexidine Itching    Tegaderm Transparent Dressing (Informational Only) Itching       Past medical, surgical, social and family history were reviewed and updated in EPIC.    ROS:   C:     NEGATIVE for fever, chills, change in weight  I:       NEGATIVE for worrisome rashes, moles or lesions  E:     NEGATIVE for vision changes or irritation  E/M: NEGATIVE for ear, mouth and throat problems  R:     NEGATIVE for significant cough or SOB  CV:   NEGATIVE for chest pain, palpitations or peripheral edema  GI:     NEGATIVE for nausea, abdominal pain, heartburn, or change in bowel habits  :   NEGATIVE for  "frequency, dysuria, hematuria, vaginal discharge, or irregular bleeding  M:     NEGATIVE for significant arthralgias or myalgia  N:      NEGATIVE for weakness, dizziness or paresthesias  E:      NEGATIVE for temperature intolerance, skin/hair changes  P:      NEGATIVE for changes in mood or affect.    EXAM:  /58   Pulse 98   Ht 1.651 m (5' 5\")   Wt 59.4 kg (131 lb)   SpO2 98%   BMI 21.80 kg/m     BMI: Body mass index is 21.8 kg/m .  Constitutional: healthy, alert and no distress  Head: Normocephalic. No masses, lesions, tenderness or abnormalities  Neck: Neck supple. Trachea midline. No adenopathy. Thyroid symmetric, normal size.   Cardiovascular: RRR.   Respiratory: Negative.   Breast: No nodularity, asymmetry or nipple discharge bilaterally.  Gastrointestinal: Abdomen soft, non-tender, non-distended. No masses, organomegaly.  :  Vulva:  No external lesions, normal female hair distribution, no inguinal adenopathy.    Urethra:  Midline, non-tender, well supported, no discharge  Vagina:  Moist, pink, no abnormal discharge, no lesions  Cervix visually normal.  IUD strings visualized  Rectal Exam: deferred  Musculoskeletal: extremities normal  Skin: no suspicious lesions or rashes  Psychiatric: Affect appropriate, cooperative,mentation appears normal.     PROCEDURE: IUD REMOVAL  Patient has verbalized understanding of risks and benefits. All questions answered. She has signed the consent form.      A medium Ussan speculum was placed in the vagina with good visualization of the cervix.  The IUD strings visualized at cervical os. IUD strings grasped with sterile ring forceps. Gentle traction applied and IUD removed intact without difficulty. There were no complications. The patient tolerated the procedure well.       COUNSELING:   Reviewed preventive health counseling, as reflected in patient instructions  Special attention given to:        Family planning   reports that she has never smoked. She has " never used smokeless tobacco.    Body mass index is 21.8 kg/m .    FRAX Risk Assessment    ASSESSMENT:  28 year old female with satisfactory annual exam  (Z00.00) Annual physical exam  (primary encounter diagnosis)  Comment: HM reviewed and UTD    (Z30.432) Encounter for IUD removal  Comment: Uncomplicated IUD removal.  Plans condoms until she starts trying.  Will reach out if she'd like to start something else. Rec starting prenatal vitamins 3-4 months before she starts trying to conceive.  Plan: REMOVE INTRAUTERINE DEVICE          RTC 1 year and prn.    Aarti Flowers MD

## 2024-03-21 ENCOUNTER — OFFICE VISIT (OUTPATIENT)
Dept: FAMILY MEDICINE | Facility: CLINIC | Age: 29
End: 2024-03-21
Payer: COMMERCIAL

## 2024-03-21 VITALS
HEART RATE: 61 BPM | BODY MASS INDEX: 21.67 KG/M2 | DIASTOLIC BLOOD PRESSURE: 63 MMHG | TEMPERATURE: 97.3 F | WEIGHT: 130.1 LBS | HEIGHT: 65 IN | RESPIRATION RATE: 16 BRPM | SYSTOLIC BLOOD PRESSURE: 95 MMHG | OXYGEN SATURATION: 97 %

## 2024-03-21 DIAGNOSIS — Z71.84 TRAVEL ADVICE ENCOUNTER: Primary | ICD-10-CM

## 2024-03-21 PROCEDURE — 99401 PREV MED CNSL INDIV APPRX 15: CPT | Mod: GA | Performed by: NURSE PRACTITIONER

## 2024-03-21 ASSESSMENT — PAIN SCALES - GENERAL: PAINLEVEL: NO PAIN (0)

## 2024-03-21 NOTE — PATIENT INSTRUCTIONS
Thank you for visiting the [facility] : [phone]  Today March 21, 2024 you received the    Future     Ticovac ( Tick Borne Encephaltitis )  Day 0   Second dose 2 weeks or later  Dose three is  6 months or later after dose 1     Follow up vaccine appointments can be made as a NURSE ONLY visit at the Travel Clinic, (BE PREPARED TO WAIT, ) or at designated [place].    If you are receiving the Rabies vaccines series, it is important that you follow the exact schedule ordered.     Pre-travel     We recommend that you purchase Medical Evacuation Insurance prior to your departure.  Https://wwwnc.cdc.gov/travel/page/insurance    [place] your travel plans with the  Department of State through STEP ( Smart Traveler Enrollment Program ) https://step.state.gov.  STEP is a free service to allow U[place] citizens and nationals traveling and living abroad to enroll their trip with the nearest U.S. Embassy or Consulate.    Animal Exposure: Avoid all mammals even if they look healthy.  If there is a bite, scratch or even a lick, wash area immediately with soap and water for 15 minutes and seek medical care within 24 hours for evaluation of Rabies post exposure treatment.  Contact your Medical Evacuation Insurance.    Repiratory illness prevention strategies ( Covid and Influenza ) CDC recommendations:  Consider wearing a mask in crowded or poorly ventilated indoor areas, including on public transportation and in transportation hubs.  Hand washing: frequent, thorough handwashing with soap and water for 20 seconds. Use an alcohol-based hand  with at least 60% alcohol if soap and water are not readily available. Avoid touching face, nose, eyes, mouth unless you have done appropriate hand washing as above.  VACCINES: Staying up to date on COVID-19 vaccines significantly lowers the risk of getting very sick, being hospitalized, or dying from COVID-19. CDC recommends that  everyone stay up to date on their COVID-19 vaccines, especially people with weakened immune systems.    Travel Covid 19 Testing:  updated 12/06/2021  International travelers: Pre-travel:  See country specific Embassy websites or airline websites.    Post travel: CDC recommends getting tested 3-5 days after your trip     Post-travel illness:  Contact your provider or [facility] if you develop a fever, rash, cough, diarrhea or other symptoms for up to 1 year after travel.  Inform your healthcare provider when and where you traveled to.    Please call the [facility] with any questions [phone]  Or send your provider a 'My Chart' note.

## 2024-03-21 NOTE — PROGRESS NOTES
"Nurse Note ( Pre-Travel Consult)    Itinerary: [place], [place]    Departure Date: 6/1/2024    Return Date: 6/15/2024    Length of Trip: 15 days    Reason for Travel: Tourism         Urban or rural: both    Accommodations: Airb    IMMUNIZATION HISTORY  Have you received any immunizations within the past 4 weeks?  No  Have you ever fainted from having your blood drawn or from an injection?  No  Have you ever had a fever reaction to vaccination?  Yes - Pt reports second COVID vaccine (Pfizer)  Have you ever had any bad reaction or side effect from any vaccination?  No  Have you ever had hepatitis A or B vaccine?  Yes - per review of immunization report and Pt's report  Do you live (or work closely) with anyone who has AIDS, an AIDS-like condition, any other immune disorder or who is on chemotherapy for cancer?  No  Do you have a family history of immunodeficiency?  Yes - Pt has personal diagnosis of Celiac's disease per Pt report and chart review  Have you received any injection of immune globulin or any blood products during the past 12 months?  No    Patient roomed by JIMI Squires    Subjective  Melody Cash is a 28 year old female seen today alone for counsultation for international travel.   Patient will be departing in  2.5 month(s) and  traveling with spouse.      Patient itinerary :  will be in the [facility] region of [place] which risk for Tick borne encephalitis. exposure.      Patient's activities will include sightseeing and  hiking in the [place] region     Patient's country of birth is [place]    Special medical concerns: none  Pre-travel questionnaire was completed by patient and reviewed by provider.     Vitals: BP 95/63 (BP Location: Left arm, Patient Position: Sitting, Cuff Size: Adult Regular)   Pulse 61   Temp 97.3  F (36.3  C) (Temporal)   Resp 16   Ht 1.651 m (5' 5\")   Wt 59 kg (130 lb 1.6 oz)   LMP 03/18/2024 (Within Days)   SpO2 97%   BMI 21.65 kg/m    BMI= Body mass index is 21.65 " kg/m .    EXAM:  General:  Well-nourished, well-developed in no acute distress.  Appears to be stated age, interacts appropriately and expresses understanding of information given to patient.    Current Outpatient Medications   Medication Sig Dispense Refill    calcium carbonate-vitamin D (OS-LIN) 500-400 MG-UNIT tablet Take 1 tablet by mouth daily (Patient not taking: Reported on 3/21/2024)      levonorgestrel (MIRENA) 20 MCG/24HR IUD 1 each by Intrauterine route once (Patient not taking: Reported on 3/21/2024)       Patient Active Problem List   Diagnosis    Celiac disease    Family history of melanoma    Acute low back pain without sciatica    Upper back pain    Muscle weakness (generalized)     Allergies   Allergen Reactions    Gluten Meal GI Disturbance and Other (See Comments)    Chlorhexidine Itching    Tegaderm Transparent Dressing (Informational Only) Itching         Immunizations discussed include:   Covid 19: Up to date  Hepatitis A:  Up to date  Hepatitis B: Up to date  Influenza: Up to date  Typhoid: Not indicated  Rabies: Not indicated  Yellow Fever: Not indicated  Japanese Encephalitis: Not indicated  Meningococcus: Not indicated  Tetanus/Diphtheria: Up to date  Measles/Mumps/Rubella: Up to date  Cholera: Not needed  Polio: Not indicated  Pneumococcal: Under age of 65  Varicella: Immune by disease history per patient report  Shingrix: Not indicated  HPV:  Up to date   Tick Born Encephalitis: future order placed, reviewed risks of disease as per CDC. Melody will double check with insurance coverage then make a nurse only visit.    TB: not a risk     Altitude Exposure on this trip: not significant ( 4-5000ft  Past tolerance to Altitude: na    ASSESSMENT/PLAN:  Melody was seen today for travel clinic.    Diagnoses and all orders for this visit:    Travel advice encounter  -     TICK-BORNE ENCEPHALITIS 0.5 ML (TICOVAC)VACCINE; Standing      I have reviewed general recommendations for safe travel   including:  food/water precautions, insect precautions,   roadway safety. Educational materials and [employer] report provided.    Malaraia prophylaxis recommended: NA  Symptomatic treatment for traveler's diarrhea: NA  Altitude illness prevention and treatment: not an issue       Evacuation insurance advised and resources were provided to patient.    Total visit time 20 minutes  with over 50% of time spent counseling patient and shared decision making as detailed above.    Zunilda Truong CNP  Certificate in Travel Health

## 2024-04-16 ENCOUNTER — ALLIED HEALTH/NURSE VISIT (OUTPATIENT)
Dept: FAMILY MEDICINE | Facility: CLINIC | Age: 29
End: 2024-04-16
Payer: COMMERCIAL

## 2024-04-16 VITALS — TEMPERATURE: 97.1 F

## 2024-04-16 DIAGNOSIS — Z23 ENCOUNTER FOR IMMUNIZATION: Primary | ICD-10-CM

## 2024-04-16 DIAGNOSIS — Z71.84 TRAVEL ADVICE ENCOUNTER: ICD-10-CM

## 2024-04-16 PROCEDURE — 90471 IMMUNIZATION ADMIN: CPT | Mod: GA

## 2024-04-16 PROCEDURE — 90627 TIC-BRN ENCEPH VAC 0.5ML IM: CPT | Mod: GA

## 2024-04-16 PROCEDURE — 99207 PR NO CHARGE NURSE ONLY: CPT

## 2024-04-16 NOTE — PROGRESS NOTES
Prior to immunization administration, verified patients identity using patient s name and date of birth. Please see Immunization Activity for additional information.     Screening Questionnaire for Adult Immunization    Are you sick today?   No   Do you have allergies to medications, food, a vaccine component or latex?   No   Have you ever had a serious reaction after receiving a vaccination?   No   Do you have a long-term health problem with heart, lung, kidney, or metabolic disease (e.g., diabetes), asthma, a blood disorder, no spleen, complement component deficiency, a cochlear implant, or a spinal fluid leak?  Are you on long-term aspirin therapy?   No   Do you have cancer, leukemia, HIV/AIDS, or any other immune system problem?   No   Do you have a parent, brother, or sister with an immune system problem?   No   In the past 3 months, have you taken medications that affect  your immune system, such as prednisone, other steroids, or anticancer drugs; drugs for the treatment of rheumatoid arthritis, Crohn s disease, or psoriasis; or have you had radiation treatments?   No   Have you had a seizure, or a brain or other nervous system problem?   No   During the past year, have you received a transfusion of blood or blood    products, or been given immune (gamma) globulin or antiviral drug?   No   For women: Are you pregnant or is there a chance you could become       pregnant during the next month?   No   Have you received any vaccinations in the past 4 weeks?   No     Immunization questionnaire answers were all negative.    I have reviewed the following standing orders: Not Applicable; Order were already placed prior to ancillary visit    Patient instructed to remain in clinic for 15 minutes afterwards, and to report any adverse reactions.     Screening performed by Natalie Zepeda RN on 4/16/2024 at 9:12 AM.

## 2024-04-23 ENCOUNTER — OFFICE VISIT (OUTPATIENT)
Dept: FAMILY MEDICINE | Facility: CLINIC | Age: 29
End: 2024-04-23
Payer: COMMERCIAL

## 2024-04-23 DIAGNOSIS — D22.9 MULTIPLE BENIGN NEVI: Primary | ICD-10-CM

## 2024-04-23 DIAGNOSIS — Z80.8 FAMILY HISTORY OF MELANOMA: ICD-10-CM

## 2024-04-23 PROCEDURE — 99213 OFFICE O/P EST LOW 20 MIN: CPT | Performed by: DERMATOLOGY

## 2024-04-23 RX ORDER — VITAMIN A ACETATE, .BETA.-CAROTENE, ASCORBIC ACID, CHOLECALCIFEROL, .ALPHA.-TOCOPHEROL ACETATE, DL-, THIAMINE MONONITRATE, RIBOFLAVIN, NIACINAMIDE, PYRIDOXINE HYDROCHLORIDE, FOLIC ACID, CYANOCOBALAMIN, CALCIUM CARBONATE, FERROUS FUMARATE, ZINC OXIDE, AND CUPRIC OXIDE 2000; 2000; 120; 400; 22; 1.84; 3; 20; 10; 1; 12; 200; 27; 25; 2 [IU]/1; [IU]/1; MG/1; [IU]/1; MG/1; MG/1; MG/1; MG/1; MG/1; MG/1; UG/1; MG/1; MG/1; MG/1; MG/1
TABLET ORAL
COMMUNITY
Start: 2024-03-26

## 2024-04-23 ASSESSMENT — PAIN SCALES - GENERAL: PAINLEVEL: NO PAIN (0)

## 2024-04-23 NOTE — LETTER
4/23/2024         RE: Melody Cash  [address] [facility] [address]        Dear Colleague,    Thank you for referring your patient, Melody Cash, to the [facility]. Please see a copy of my visit note below.    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  Pertinent PMH: celiac.  ____________________________________________    Assessment & Plan:    # Multiple benign nevi.   # Fhx melanoma.  - No concerning lesions today   - Monitor for ABCDEs of melanoma   - Continue sun protection - recommend SPF 30 or higher with frequent application   - Return sooner if noticing changing or symptomatic lesions    Procedures Performed:   None     Follow-up: 1 - 2 year(s) in-person, or earlier for new or changing lesions    Staff and scribe:    Scribe Disclosure:   I, INNA TORREZ, am serving as a scribe; to document services personally performed by Sanaz Hayes MD -based on data collection and the provider's statements to me.     Provider Disclosure:   The documentation recorded by the scribe accurately reflects the services I personally performed and the decisions made by me.    Sanaz Hayes MD    Department of Dermatology  [facility]: Phone: [phone], Fax: [phone]  4/23/2024     ____________________________________________    CC: RECHECK ([facility])      Reviewed patients past medical history and pertinent chart review prior to patient's visit today.     HPI:  Ms. Melody Cash is a 28 year old female who presents today as a return patient for [facility].     Today patient reports a few spots of concern but has always  had those spots. Has not noticed changes on them.     Has a hx of melanoma in paternal uncle.     Patient is otherwise feeling well, without additional concerns.    Labs:  N/A    Physical Exam:  Vitals: LMP 03/18/2024 (Within Days)   SKIN: Full body skin exam excluding the genitals was performed including face, scalp, neck, ears, chest, back, bilateral arms, hands, bilateral legs, feet, and buttocks.   - Multiple regular brown pigmented macules and papules are identified on the trunk and extremities. .   - No other lesions of concern on areas examined.     Medications:  Current Outpatient Medications   Medication Sig Dispense Refill     Prenatal Vit-Fe Fumarate-FA (PNV PRENATAL PLUS MULTIVITAMIN) 27-1 MG TABS per tablet        calcium carbonate-vitamin D (OS-LIN) 500-400 MG-UNIT tablet Take 1 tablet by mouth daily (Patient not taking: Reported on 3/21/2024)       levonorgestrel (MIRENA) 20 MCG/24HR IUD 1 each by Intrauterine route once (Patient not taking: Reported on 3/21/2024)       No current facility-administered medications for this visit.     Facility-Administered Medications Ordered in Other Visits   Medication Dose Route Frequency Provider Last Rate Last Admin     perflutren diluted 1mL to 2mL with saline (OPTISON) diluted injection 6 mL  6 mL Intravenous Once RUI Graves MD          Past Medical/Surgical History:   Patient Active Problem List   Diagnosis     Celiac disease     Family history of melanoma     Acute low back pain without sciatica     Upper back pain     Muscle weakness (generalized)     History reviewed. No pertinent past medical history.                     Again, thank you for allowing me to participate in the care of your patient.        Sincerely,        Sanaz Hayes MD

## 2024-04-23 NOTE — PROGRESS NOTES
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  Pertinent PMH: celiac.  ____________________________________________    Assessment & Plan:    # Multiple benign nevi.   # Fhx melanoma.  - No concerning lesions today   - Monitor for ABCDEs of melanoma   - Continue sun protection - recommend SPF 30 or higher with frequent application   - Return sooner if noticing changing or symptomatic lesions    Procedures Performed:   None     Follow-up: 1 - 2 year(s) in-person, or earlier for new or changing lesions    Staff and scribe:    Scribe Disclosure:   I, INNA TORREZ, am serving as a scribe; to document services personally performed by Sanaz Hayes MD -based on data collection and the provider's statements to me.     Provider Disclosure:   The documentation recorded by the scribe accurately reflects the services I personally performed and the decisions made by me.    Sanaz Hayes MD    Department of Dermatology  [facility]: Phone: [phone], Fax: [phone]  4/23/2024     ____________________________________________    CC: RECHECK ([facility])      Reviewed patients past medical history and pertinent chart review prior to patient's visit today.     HPI:  Ms. Melody Cash is a 28 year old female who presents today as a return patient for FBS.     Today patient reports a few spots of concern but has always had those spots. Has not noticed changes on them.     Has a hx of melanoma in paternal uncle.     Patient is otherwise feeling well, without additional concerns.    Labs:  N/A    Physical Exam:  Vitals: LMP 03/18/2024 (Within Days)   SKIN: Full body  skin exam excluding the genitals was performed including face, scalp, neck, ears, chest, back, bilateral arms, hands, bilateral legs, feet, and buttocks.   - Multiple regular brown pigmented macules and papules are identified on the trunk and extremities. .   - No other lesions of concern on areas examined.     Medications:  Current Outpatient Medications   Medication Sig Dispense Refill    Prenatal Vit-Fe Fumarate-FA (PNV PRENATAL PLUS MULTIVITAMIN) 27-1 MG TABS per tablet       calcium carbonate-vitamin D (OS-LIN) 500-400 MG-UNIT tablet Take 1 tablet by mouth daily (Patient not taking: Reported on 3/21/2024)      levonorgestrel (MIRENA) 20 MCG/24HR IUD 1 each by Intrauterine route once (Patient not taking: Reported on 3/21/2024)       No current facility-administered medications for this visit.     Facility-Administered Medications Ordered in Other Visits   Medication Dose Route Frequency Provider Last Rate Last Admin    perflutren diluted 1mL to 2mL with saline (OPTISON) diluted injection 6 mL  6 mL Intravenous Once RUI Graves MD          Past Medical/Surgical History:   Patient Active Problem List   Diagnosis    Celiac disease    Family history of melanoma    Acute low back pain without sciatica    Upper back pain    Muscle weakness (generalized)     History reviewed. No pertinent past medical history.

## 2024-04-23 NOTE — PATIENT INSTRUCTIONS
Patient Education       Proper skin care from [facility]:    -Eliminate harsh soaps as they strip the natural oils from the skin, often resulting in dry itchy skin ( i.e. Dial, Zest, [place])  -Use mild soaps such as Cetaphil or Dove Sensitive Skin in the shower. You do not need to use soap on arms, legs, and trunk every time you shower unless visibly soiled.   -Avoid hot or cold showers.  -After showering, lightly dry off and apply moisturizing within 2-3 minutes. This will help trap moisture in the skin.   -Aggressive use of a moisturizer at least 1-2 times a day to the entire body (including -Vanicream, Cetaphil, Aquaphor or Cerave) and moisturize hands after every washing.  -We recommend using moisturizers that come in a tub that needs to be scooped out, not a pump. This has more of an oil base. It will hold moisture in your skin much better than a water base moisturizer. The above recommended are non-pore clogging.      Wear a sunscreen with at least SPF 30 on your face, ears, neck and V of the chest daily. Wear sunscreen on other areas of the body if those areas are exposed to the sun throughout the day. Sunscreens can contain physical and/or chemical blockers. Physical blockers are less likely to clog pores, these include zinc oxide and titanium dioxide. Reapply every two hour and after swimming.     Sunscreen examples: https://www.ewg.org/sunscreen/    UV radiation  UVA radiation remains constant throughout the day and throughout the year. It is a longer wavelength than UVB and therefore penetrates deeper into the skin leading to immediate and delayed tanning, photoaging, and skin cancer. 70-80% of UVA and UVB radiation occurs between the hours of 10am-2pm.  UVB radiation  UVB radiation causes the most harmful effects and is more significant during the summer months. However, snow and ice can reflect UVB radiation leading to skin damage during the winter months as well. UVB radiation is  responsible for tanning, burning, inflammation, delayed erythema (pinkness), pigmentation (brown spots), and skin cancer.     I recommend self monthly full body exams and yearly full body exams with a dermatology provider. If you develop a new or changing lesion please follow up for examination. Most skin cancers are pink and scaly or pink and pearly. However, we do see blue/brown/black skin cancers.  Consider the ABCDEs of melanoma when giving yourself your monthly full body exam ( don't forget the groin, buttocks, feet, toes, etc). A-asymmetry, B-borders, C-color, D-diameter, E-elevation or evolving. If you see any of these changes please follow up in clinic. If you cannot see your back I recommend purchasing a hand held mirror to use with a larger wall mirror.       Checking for Skin Cancer  You can find cancer early by checking your skin each month. There are 3 kinds of skin cancer. They are melanoma, basal cell carcinoma, and squamous cell carcinoma. Doing monthly skin checks is the best way to find new marks or skin changes. Follow the instructions below for checking your skin.   The ABCDEs of checking moles for melanoma   Check your moles or growths for signs of melanoma using ABCDE:   Asymmetry: the sides of the mole or growth don t match  Border: the edges are ragged, notched, or blurred  Color: the color within the mole or growth varies  Diameter: the mole or growth is larger than 6 mm (size of a pencil eraser)  Evolving: the size, shape, or color of the mole or growth is changing (evolving is not shown in the images below)    Checking for other types of skin cancer  Basal cell carcinoma or squamous cell carcinoma have symptoms such as:     A spot or mole that looks different from all other marks on your skin  Changes in how an area feels, such as itching, tenderness, or pain  Changes in the skin's surface, such as oozing, bleeding, or scaliness  A sore that does not heal  New swelling or redness beyond  the border of a mole    Who s at risk?  Anyone can get skin cancer. But you are at greater risk if you have:   Fair skin, light-colored hair, or light-colored eyes  Many moles or abnormal moles on your skin  A history of sunburns from sunlight or tanning beds  A family history of skin cancer  A history of exposure to radiation or chemicals  A weakened immune system  If you have had skin cancer in the past, you are at risk for recurring skin cancer.   How to check your skin  Do your monthly skin checkups in front of a full-length mirror. Check all parts of your body, including your:   Head (ears, face, neck, and scalp)  Torso (front, back, and sides)  Arms (tops, undersides, upper, and lower armpits)  Hands (palms, backs, and fingers, including under the nails)  Buttocks and genitals  Legs (front, back, and sides)  Feet (tops, soles, toes, including under the nails, and between toes)  If you have a lot of moles, take digital photos of them each month. Make sure to take photos both up close and from a distance. These can help you see if any moles change over time.   Most skin changes are not cancer. But if you see any changes in your skin, call your doctor right away. Only he or she can diagnose a problem. If you have skin cancer, seeing your doctor can be the first step toward getting the treatment that could save your life.   VFA last reviewed this educational content on 4/1/2019 2000-2020 [employer]. [address] [place] [address], [place] [address]. All rights reserved. This information is not intended as a substitute for professional medical care. Always follow your healthcare professional's instructions.       When should I call my doctor?  If you are worsening or not improving, please, contact us or seek urgent care as noted below.     Who should I call with questions (adults)?  [facility] (adult and pediatric): [phone]  [facility]  [place] (adult): [phone]  [facility] ([place], [place], [place] and [place]) [phone]  For urgent needs outside of business hours call the [facility] at [phone] and ask for the dermatology resident on call to be paged  If this is a medical emergency and you are unable to reach an ER, Call 911      If you need a prescription refill, please contact your pharmacy. Refills are approved or denied by our Physicians during normal business hours, Monday through Fridays  Per office policy, refills will not be granted if you have not been seen within the past year (or sooner depending on your child's condition) Topical Retinoids    What are topical retinoids?  These are medicines that are related to Vitamin A. They are used on the skin.  Retin-A , Renova , Differin , and Tazorac  are brand names.  Come in creams and clear gels  Used to treat skin conditions like pimples (acne), face wrinkling, or dark-colored sunspots    How do I use these medicines?  Wash face and let dry for 15 to 30 minutes.  Use a large pea-size amount of medicine to cover the whole face. Do not put on close to the eyes and lips. Rub in gently.   Start by using every other day. If you have no irritation after a few days, start to use it daily.   You might have too much irritation with daily use. Use it less often until the irritation goes away. Then try to increase slowly to daily use.   Irritation improves over time.  You may use moisturizer if your skin becomes dry. Look for  non-comedogenic  (non-pore plugging) and oil free products.     What are the side effects?  Dryness   Peeling and flaking   Irritation of the skin   Possible increased chance of sunburns. Protect your skin from sunlight. Wear a hat and use a sunscreen with SPF 30 or higher. Your sunscreen should have both UVA and UVB (broad-spectrum) protection.    Who should I call with questions?  [facility]  [place]: [phone]   [facility]: [phone]  For urgent needs outside of business hours call the [facility] at [phone] and ask for the dermatology resident on call

## 2024-05-06 ENCOUNTER — ALLIED HEALTH/NURSE VISIT (OUTPATIENT)
Dept: FAMILY MEDICINE | Facility: CLINIC | Age: 29
End: 2024-05-06
Payer: COMMERCIAL

## 2024-05-06 DIAGNOSIS — Z23 ENCOUNTER FOR IMMUNIZATION: Primary | ICD-10-CM

## 2024-05-06 DIAGNOSIS — Z71.84 TRAVEL ADVICE ENCOUNTER: ICD-10-CM

## 2024-05-06 PROCEDURE — 90471 IMMUNIZATION ADMIN: CPT

## 2024-05-06 PROCEDURE — 90627 TIC-BRN ENCEPH VAC 0.5ML IM: CPT

## 2024-05-06 PROCEDURE — 99207 PR NO CHARGE NURSE ONLY: CPT

## 2024-05-06 NOTE — PROGRESS NOTES
Prior to immunization administration, verified patients identity using patient s name and date of birth. Please see Immunization Activity for additional information.     Screening Questionnaire for Adult Immunization    Are you sick today?   No   Do you have allergies to medications, food, a vaccine component or latex?   No   Have you ever had a serious reaction after receiving a vaccination?   No   Do you have a long-term health problem with heart, lung, kidney, or metabolic disease (e.g., diabetes), asthma, a blood disorder, no spleen, complement component deficiency, a cochlear implant, or a spinal fluid leak?  Are you on long-term aspirin therapy?   No   Do you have cancer, leukemia, HIV/AIDS, or any other immune system problem?   No   Do you have a parent, brother, or sister with an immune system problem?   No   In the past 3 months, have you taken medications that affect  your immune system, such as prednisone, other steroids, or anticancer drugs; drugs for the treatment of rheumatoid arthritis, Crohn s disease, or psoriasis; or have you had radiation treatments?   No   Have you had a seizure, or a brain or other nervous system problem?   No   During the past year, have you received a transfusion of blood or blood    products, or been given immune (gamma) globulin or antiviral drug?   No   For women: Are you pregnant or is there a chance you could become       pregnant during the next month?   No   Have you received any vaccinations in the past 4 weeks?   Yes     Immunization questionnaire was positive for at least one answer.    I have reviewed the following standing orders: Tick - Borne Encephalitis  Not Applicable; Order were already placed prior to ancillary visit    Patient instructed to remain in clinic for 15 minutes afterwards, and to report any adverse reactions.     Screening performed by Radha Manzano on 5/6/2024 at 9:54 AM.

## 2024-07-16 ENCOUNTER — TRANSFERRED RECORDS (OUTPATIENT)
Dept: HEALTH INFORMATION MANAGEMENT | Facility: CLINIC | Age: 29
End: 2024-07-16
Payer: COMMERCIAL

## 2024-07-19 ENCOUNTER — TRANSFERRED RECORDS (OUTPATIENT)
Dept: HEALTH INFORMATION MANAGEMENT | Facility: CLINIC | Age: 29
End: 2024-07-19
Payer: COMMERCIAL

## 2024-07-31 ENCOUNTER — VIRTUAL VISIT (OUTPATIENT)
Dept: OBGYN | Facility: CLINIC | Age: 29
End: 2024-07-31
Attending: OBSTETRICS & GYNECOLOGY
Payer: COMMERCIAL

## 2024-07-31 ENCOUNTER — TRANSCRIBE ORDERS (OUTPATIENT)
Dept: MATERNAL FETAL MEDICINE | Facility: CLINIC | Age: 29
End: 2024-07-31

## 2024-07-31 VITALS — BODY MASS INDEX: 21.65 KG/M2 | HEIGHT: 65 IN

## 2024-07-31 DIAGNOSIS — Z34.00 ENCOUNTER FOR SUPERVISION OF NORMAL FIRST PREGNANCY: Primary | ICD-10-CM

## 2024-07-31 DIAGNOSIS — O26.90 PREGNANCY RELATED CONDITION: Primary | ICD-10-CM

## 2024-07-31 PROBLEM — M54.9 UPPER BACK PAIN: Status: RESOLVED | Noted: 2023-05-22 | Resolved: 2024-07-31

## 2024-07-31 PROBLEM — M54.50 ACUTE LOW BACK PAIN WITHOUT SCIATICA: Status: RESOLVED | Noted: 2023-05-22 | Resolved: 2024-07-31

## 2024-07-31 PROBLEM — M62.81 MUSCLE WEAKNESS (GENERALIZED): Status: RESOLVED | Noted: 2023-06-01 | Resolved: 2024-07-31

## 2024-07-31 PROCEDURE — 99207 PR NO CHARGE NURSE ONLY: CPT | Mod: 93

## 2024-07-31 NOTE — PROGRESS NOTES
Important Information for Provider:     New ob nurse intake by phone, first pregnancy. Recommended B6, Unisom for nausea. Handouts reviewed. Ordered genetic screening. Ultrasound scheduled for 8/16/2024 with RN to call back with results. NOB with Dr Flowers 9/03/2024  IUD removed 3/2024, normal periods     Caffeine intake/servings daily - 1  Calcium intake/servings daily - 3  Exercise 5 times weekly - describe ; runs, walks, works as a nurse, precautions given  Sunscreen used - Yes  Seatbelts used - Yes  Guns stored in the home - No  Self Breast Exam - Yes  Pap test up to date -  Yes  Dental exam up to date -  Yes  Do you feel safe in your environment - Yes  Do you cope well with stress - Yes        Prenatal OB Questionnaire  Patient supplied answers from flow sheet for:  Prenatal OB Questionnaire.  Past Medical History  Have you ever received care for your mental health? : No  Have you ever been in a major accident or suffered serious trauma?: No  Within the last year, has anyone hit, slapped, kicked or otherwise hurt you?: No  In the last year, has anyone forced you to have sex when you didn't want to?: No    Past Medical History 2   Have you ever received a blood transfusion?: No  Would you accept a blood transfusion if was medically recommended?: Yes  Does anyone in your home smoke?: No   Is your blood type Rh negative?: Unknown  Have you ever ?: No  Have you been hospitalized for a nonsurgical reason excluding normal delivery?: No  Have you ever had an abnormal pap smear?: No    Past Medical History (Continued)  Do you have a history of abnormalities of the uterus?: No  Did your mother take SIDNEY or any other hormones when she was pregnant with you?: No  Do you have any other problems we have not asked about which you feel may be important to this pregnancy?: No                   Allergies as of 7/31/2024:    Allergies as of 07/31/2024 - Reviewed 04/23/2024   Allergen Reaction Noted    Gluten meal GI  Disturbance and Other (See Comments) 11/29/2013    Chlorhexidine Itching 05/06/2022    Tegaderm transparent dressing (informational only) Itching 05/06/2022    Adhesive tape Rash 04/23/2024       Current medications are:  Current Outpatient Medications   Medication Sig Dispense Refill    Prenatal Vit-Fe Fumarate-FA (PNV PRENATAL PLUS MULTIVITAMIN) 27-1 MG TABS per tablet            Early ultrasound screening tool:    Does patient have irregular periods?  No  Did patient use hormonal birth control in the three months prior to positive urine pregnancy test? No  Is the patient breastfeeding?  No  Is the patient 10 weeks or greater at time of education visit?  No

## 2024-08-16 ENCOUNTER — ANCILLARY PROCEDURE (OUTPATIENT)
Dept: ULTRASOUND IMAGING | Facility: CLINIC | Age: 29
End: 2024-08-16
Attending: OBSTETRICS & GYNECOLOGY
Payer: COMMERCIAL

## 2024-08-16 ENCOUNTER — ALLIED HEALTH/NURSE VISIT (OUTPATIENT)
Dept: NURSING | Facility: CLINIC | Age: 29
End: 2024-08-16
Payer: COMMERCIAL

## 2024-08-16 DIAGNOSIS — Z34.00 ENCOUNTER FOR SUPERVISION OF NORMAL FIRST PREGNANCY: ICD-10-CM

## 2024-08-16 DIAGNOSIS — Z32.01 PREGNANCY EXAMINATION OR TEST, POSITIVE RESULT: Primary | ICD-10-CM

## 2024-08-16 PROCEDURE — 76817 TRANSVAGINAL US OBSTETRIC: CPT | Performed by: OBSTETRICS & GYNECOLOGY

## 2024-08-28 ENCOUNTER — TELEPHONE (OUTPATIENT)
Dept: OBGYN | Facility: CLINIC | Age: 29
End: 2024-08-28
Payer: COMMERCIAL

## 2024-08-28 NOTE — TELEPHONE ENCOUNTER
[facility] Call Center    Phone Message    May a detailed message be left on voicemail: yes     Reason for Call: Other: . Pt is calling in wondering how often she is needing US's during her pregnancy, please reach out to Melody, thank you     Action Taken: Message routed to:  Other: obgyn    Travel Screening: Not Applicable     Date of Service:

## 2024-08-28 NOTE — TELEPHONE ENCOUNTER
RN called Melody back.  She is wondering when she needs to make prenatal appointments because she wants to see SK and knows the appointments book up quick.   Advised every 4 weeks until 28, every 2 weeks until 36, then weekly until delivery. Connected Melody with RD  to schedule appointment.     Sisi MARTINEZ RN   [facility] Triage RN

## 2024-09-02 LAB
ABO/RH(D): NORMAL
ANTIBODY SCREEN: NEGATIVE
SPECIMEN EXPIRATION DATE: NORMAL

## 2024-09-03 ENCOUNTER — PRENATAL OFFICE VISIT (OUTPATIENT)
Dept: OBGYN | Facility: CLINIC | Age: 29
End: 2024-09-03
Attending: OBSTETRICS & GYNECOLOGY
Payer: COMMERCIAL

## 2024-09-03 VITALS
RESPIRATION RATE: 16 BRPM | TEMPERATURE: 98.2 F | OXYGEN SATURATION: 100 % | HEART RATE: 61 BPM | DIASTOLIC BLOOD PRESSURE: 64 MMHG | BODY MASS INDEX: 21.47 KG/M2 | SYSTOLIC BLOOD PRESSURE: 97 MMHG | WEIGHT: 129 LBS

## 2024-09-03 DIAGNOSIS — Z34.01 ENCOUNTER FOR SUPERVISION OF NORMAL FIRST PREGNANCY IN FIRST TRIMESTER: Primary | ICD-10-CM

## 2024-09-03 DIAGNOSIS — E83.19 IRON EXCESS: ICD-10-CM

## 2024-09-03 LAB
ALBUMIN UR-MCNC: NEGATIVE MG/DL
APPEARANCE UR: CLEAR
BILIRUB UR QL STRIP: NEGATIVE
COLOR UR AUTO: YELLOW
ERYTHROCYTE [DISTWIDTH] IN BLOOD BY AUTOMATED COUNT: 12.7 % (ref 10–15)
GLUCOSE UR STRIP-MCNC: NEGATIVE MG/DL
HBV SURFACE AG SERPL QL IA: NONREACTIVE
HCT VFR BLD AUTO: 37.9 % (ref 35–47)
HCV AB SERPL QL IA: NONREACTIVE
HGB BLD-MCNC: 13.1 G/DL (ref 11.7–15.7)
HGB UR QL STRIP: NEGATIVE
HIV 1+2 AB+HIV1 P24 AG SERPL QL IA: NONREACTIVE
KETONES UR STRIP-MCNC: NEGATIVE MG/DL
LEUKOCYTE ESTERASE UR QL STRIP: NEGATIVE
MCH RBC QN AUTO: 33.1 PG (ref 26.5–33)
MCHC RBC AUTO-ENTMCNC: 34.6 G/DL (ref 31.5–36.5)
MCV RBC AUTO: 96 FL (ref 78–100)
NITRATE UR QL: NEGATIVE
PH UR STRIP: 6 [PH] (ref 5–7)
PLATELET # BLD AUTO: 213 10E3/UL (ref 150–450)
RBC # BLD AUTO: 3.96 10E6/UL (ref 3.8–5.2)
RUBV IGG SERPL QL IA: 0.86 INDEX
RUBV IGG SERPL QL IA: NORMAL
SP GR UR STRIP: <=1.005 (ref 1–1.03)
T PALLIDUM AB SER QL: NONREACTIVE
UROBILINOGEN UR STRIP-ACNC: 0.2 E.U./DL
WBC # BLD AUTO: 7.8 10E3/UL (ref 4–11)

## 2024-09-03 PROCEDURE — 87086 URINE CULTURE/COLONY COUNT: CPT | Performed by: OBSTETRICS & GYNECOLOGY

## 2024-09-03 PROCEDURE — 86762 RUBELLA ANTIBODY: CPT | Performed by: OBSTETRICS & GYNECOLOGY

## 2024-09-03 PROCEDURE — 87389 HIV-1 AG W/HIV-1&-2 AB AG IA: CPT | Performed by: OBSTETRICS & GYNECOLOGY

## 2024-09-03 PROCEDURE — 83540 ASSAY OF IRON: CPT | Performed by: OBSTETRICS & GYNECOLOGY

## 2024-09-03 PROCEDURE — 86803 HEPATITIS C AB TEST: CPT | Performed by: OBSTETRICS & GYNECOLOGY

## 2024-09-03 PROCEDURE — 85027 COMPLETE CBC AUTOMATED: CPT | Performed by: OBSTETRICS & GYNECOLOGY

## 2024-09-03 PROCEDURE — 99213 OFFICE O/P EST LOW 20 MIN: CPT | Performed by: OBSTETRICS & GYNECOLOGY

## 2024-09-03 PROCEDURE — 86901 BLOOD TYPING SEROLOGIC RH(D): CPT | Performed by: OBSTETRICS & GYNECOLOGY

## 2024-09-03 PROCEDURE — 36415 COLL VENOUS BLD VENIPUNCTURE: CPT | Performed by: OBSTETRICS & GYNECOLOGY

## 2024-09-03 PROCEDURE — 86780 TREPONEMA PALLIDUM: CPT | Performed by: OBSTETRICS & GYNECOLOGY

## 2024-09-03 PROCEDURE — 86850 RBC ANTIBODY SCREEN: CPT | Performed by: OBSTETRICS & GYNECOLOGY

## 2024-09-03 PROCEDURE — 83550 IRON BINDING TEST: CPT | Performed by: OBSTETRICS & GYNECOLOGY

## 2024-09-03 PROCEDURE — 86900 BLOOD TYPING SEROLOGIC ABO: CPT | Performed by: OBSTETRICS & GYNECOLOGY

## 2024-09-03 PROCEDURE — 81003 URINALYSIS AUTO W/O SCOPE: CPT | Performed by: OBSTETRICS & GYNECOLOGY

## 2024-09-03 PROCEDURE — 87340 HEPATITIS B SURFACE AG IA: CPT | Performed by: OBSTETRICS & GYNECOLOGY

## 2024-09-03 NOTE — PROGRESS NOTES
OB - New OB History and Physical    HPI: Melody Cash is a 29 year old  at 10w5d as dated by LMP c/w 8w US.   Estimated Date of Delivery: Mar 27, 2025.    This was a planned pregnancy.   in May and had IUD removed at annual exam prior to marriage.    Since becoming pregnant, patient reports she's been feeling nauseated, but manageable at this time.  Currently just taking B6, as Unisom made her too tired.      Previously received first two doses of Ticovac.  Would be due for third soon, but wondering if it's safe for pregnancy.    Ultrasound: 24  Early mendoza IUP with yolk sac and cardiac activity, measures 8w 0d by today's ultrasound, EDC remains 3/27/25.     Obstetric history:     OB History    Para Term  AB Living   1 0 0 0 0 0   SAB IAB Ectopic Multiple Live Births   0 0 0 0 0      # Outcome Date GA Lbr Nicolas/2nd Weight Sex Type Anes PTL Lv   1 Current                Gynecologic History:   Menstrual Interval: had 3 periods after IUD removal and before pregnancy.  28-32d  Patient's last menstrual period was 2024.   STI history: neg  Last Pap: 2022  History of abnormal pap: neg    Allergy: Gluten meal, Chlorhexidine, Tegaderm transparent dressing (informational only), and Adhesive tape      Current Medications:  Current Outpatient Medications   Medication Sig Dispense Refill    Prenatal Vit-Fe Fumarate-FA (PNV PRENATAL PLUS MULTIVITAMIN) 27-1 MG TABS per tablet       pyridOXINE (VITAMIN B6) 100 MG TABS        No current facility-administered medications for this visit.     Facility-Administered Medications Ordered in Other Visits   Medication Dose Route Frequency Provider Last Rate Last Admin    perflutren diluted 1mL to 2mL with saline (OPTISON) diluted injection 6 mL  6 mL Intravenous Once RUI Graves MD           Past Medical History:  No past medical history on file.    Past Surgical History:  Past Surgical History:   Procedure Laterality Date    jory  teeth         Social History:  Patient lives with  carina Vega.  Patient's relationship status is: .    Works as peds RN.   Denies current tobacco, alcohol or recreational drug use.   She feels safe in her relationship. Patient denies history of sexual, physical or mental abuse.     Family History:  Family History   Problem Relation Age of Onset    Dementia Mother     Anxiety Disorder Mother     Alzheimer Disease Mother     Frontotemporal dementia Mother     Prostate Cancer Father     Glaucoma Maternal Grandmother     Hypertension Maternal Grandfather     Hyperlipidemia Maternal Grandfather     Blood Disease Maternal Grandfather     Cancer Maternal Grandfather     Colon Cancer Maternal Grandfather     Cancer Paternal Grandmother     No Known Problems Paternal Grandfather     No Known Problems Brother     Melanoma Other     Coronary Artery Disease Other         PGFA    Diabetes Other         PGFA       Review of Systems  Gen:  no change in weight, no fever, no chills, no fatigue  CV: no palpitations, no chest pain, no hypertension, no syncope  Resp: no shortness of breath, no cough, no wheezing, no asthma  GI: + nausea, no vomiting, no diarrhea, no constipation, no bloating, no GERD  :  no vaginal discharge, no dysuria, no abnormal bleeding, no pelvic pain   Endo: no thyroid problems, no cold/heat intolerance, no acne, no hirsutism, no diabetes  Heme: no easy bruising or bleeding, no history of DVT/PE/CVA  Neuro: no headaches, no seizures, no strokes, no focal deficits      Physical Exam:  Vitals:    09/03/24 0910   BP: 97/64   BP Location: Right arm   Patient Position: Sitting   Cuff Size: Adult Regular   Pulse: 61   Resp: 16   Temp: 98.2  F (36.8  C)   SpO2: 100%   Weight: 58.5 kg (129 lb)     Body mass index is 21.47 kg/m .  Gen: alert, oriented, no distress,  pleasant, appears stated age, appropriately groomed  Neck: supple, trachea midline, no thyromegaly, no lymphadenopathy  HEENT: head  normocephalic, atraumatic, normal oropharynx without erythema or exudates  CV: normal heart sounds, regular rate and rhythm, no murmurs  Resp: good inspiratory effort, lungs clear to ascultation bilaterally, no wheezes or rhonchi  Abd: soft, nontender, nondistended.  +FHT  Extr: warm, well perfused, nontender, no edema  Psych: affect bright, cooperative, responds appropriately      Assessment:  Melody Cash is a 29 year old  at 10w5d presenting to establish prenatal care.    Problem List:   Normal first pregnancy    Plan:    Reviewed routine prenatal care. Discussed MD call schedule as well as role of residents and med students both in clinic and hospital.  She is okay with resident care  Pap: UTD for now.  Will update at pp visit.   Diet, Nutrition and Exercise:  Continue PNVs. Continue normal exercise. Her prepregnancy BMI is 21.  According to the WHO guidelines, patient is given a goal of gaining approximately 25-35 pounds during the course of her pregnancy.    Immunizations: plan TdaP at 28 weeks.  Rec flu and COVID vaccines in pregnancy, but not available at the time of her appointment yet.  Fetal anomaly screening: scheduled  Routine Prenatal Care: the patient will return to clinic in 4 weeks and prn    Aarti Flowers MD

## 2024-09-04 ENCOUNTER — MYC MEDICAL ADVICE (OUTPATIENT)
Dept: OBGYN | Facility: CLINIC | Age: 29
End: 2024-09-04
Payer: COMMERCIAL

## 2024-09-04 ENCOUNTER — PRE VISIT (OUTPATIENT)
Dept: MATERNAL FETAL MEDICINE | Facility: CLINIC | Age: 29
End: 2024-09-04
Payer: COMMERCIAL

## 2024-09-04 DIAGNOSIS — E83.19 IRON EXCESS: Primary | ICD-10-CM

## 2024-09-04 LAB — BACTERIA UR CULT: NORMAL

## 2024-09-05 LAB
IRON BINDING CAPACITY (ROCHE): 275 UG/DL (ref 240–430)
IRON SATN MFR SERPL: 57 % (ref 15–46)
IRON SERPL-MCNC: 157 UG/DL (ref 37–145)

## 2024-09-12 ENCOUNTER — HOSPITAL ENCOUNTER (OUTPATIENT)
Dept: ULTRASOUND IMAGING | Facility: CLINIC | Age: 29
Discharge: HOME | End: 2024-09-12
Attending: STUDENT IN AN ORGANIZED HEALTH CARE EDUCATION/TRAINING PROGRAM
Payer: COMMERCIAL

## 2024-09-12 ENCOUNTER — LAB (OUTPATIENT)
Dept: LAB | Facility: CLINIC | Age: 29
End: 2024-09-12
Attending: STUDENT IN AN ORGANIZED HEALTH CARE EDUCATION/TRAINING PROGRAM
Payer: COMMERCIAL

## 2024-09-12 ENCOUNTER — OFFICE VISIT (OUTPATIENT)
Dept: MATERNAL FETAL MEDICINE | Facility: CLINIC | Age: 29
End: 2024-09-12
Attending: STUDENT IN AN ORGANIZED HEALTH CARE EDUCATION/TRAINING PROGRAM
Payer: COMMERCIAL

## 2024-09-12 DIAGNOSIS — Z36.0 ENCOUNTER FOR ANTENATAL SCREENING FOR CHROMOSOMAL ANOMALIES: ICD-10-CM

## 2024-09-12 DIAGNOSIS — O26.90 PREGNANCY RELATED CONDITION: ICD-10-CM

## 2024-09-12 DIAGNOSIS — Z31.430 ENCOUNTER OF FEMALE FOR TESTING FOR GENETIC DISEASE CARRIER STATUS FOR PROCREATIVE MANAGEMENT: ICD-10-CM

## 2024-09-12 DIAGNOSIS — Z36.0 ENCOUNTER FOR ANTENATAL SCREENING FOR CHROMOSOMAL ANOMALIES: Primary | ICD-10-CM

## 2024-09-12 DIAGNOSIS — Z36.82 ENCOUNTER FOR (NT) NUCHAL TRANSLUCENCY SCAN: Primary | ICD-10-CM

## 2024-09-12 PROCEDURE — 76813 OB US NUCHAL MEAS 1 GEST: CPT | Mod: 26 | Performed by: STUDENT IN AN ORGANIZED HEALTH CARE EDUCATION/TRAINING PROGRAM

## 2024-09-12 PROCEDURE — 36415 COLL VENOUS BLD VENIPUNCTURE: CPT

## 2024-09-12 PROCEDURE — 96040 HC GENETIC COUNSELING, EACH 30 MINUTES: CPT | Performed by: GENETIC COUNSELOR, MS

## 2024-09-12 PROCEDURE — 76813 OB US NUCHAL MEAS 1 GEST: CPT

## 2024-09-12 NOTE — NURSING NOTE
Patient presents to [facility] for GC/NT at 12w0d due to first trimester screening. Denies LOF, vaginal bleeding, cramping/contractions. SBAR given to [facility] MD, see their note in Epic.

## 2024-09-12 NOTE — PROGRESS NOTES
"Please see \"Imaging\" tab under \"Chart Review\" for details of today's visit.    Dalia Montes De Oca    "

## 2024-09-12 NOTE — PROGRESS NOTES
"[facility]  Genetic Counseling Consult    Patient:  Melody Cash YOB: 1995   Date of Service:  24   MRN: [id number]    Melody was seen at the [facility] [place] for genetic counseling consultation to discuss the options for testing for fetal chromosome abnormalities.  The patient was accompanied by her aunt to today's visit.     IMPRESSION/ PLAN   1. Melody elected to proceed with NT ultrasound and Prequel NIPT through [facility] lab. She opted to screen for sex chromosome aneuploidies, and microdeletion conditions. Results are expected in 10-14 days. Melody provided verbal permission for results to be left on her voicemail. She requested the results do not include predicted fetal sex information as they are not planning to find out in the pregnancy. Patient was informed that results will also be available via [employer].    2. Melody elected to pursue [employer] expanded carrier screening through [facility] lab for 267 conditions. Results are expected within 3-4 weeks and we will contact her once available. If positive, Gary may opt to pursue testing.     PREGNANCY HISTORY   /Parity:    Age at Delivery: 29 year old  Gestational Age: 12w0d   NANY: 3/27/2025, by Last Menstrual Period             No significant complications or exposures were reported in the current pregnancy.    MEDICAL HISTORY   Melody reported a history of celiac disease.        FAMILY HISTORY   A three-generation pedigree was obtained today and is scanned under the \"Media\" tab in Epic. It is important to note that the family history provided is based on the patient's recollection and reported in the absence of medical records. If the family history changes or if more information is obtained, the patient should contact our clinic as this may alter recommendations.     The following significant findings were reported today:   It was reported that Melody's " mother passed away at age 59 due to frontotemporal dementia. We reviewed that some cases of FTD are genetic and can be inherited in a dominant form. Melody reported that her mother underwent genetic testing prior to her passing and no genetic etiology was identified. She was encouraged to stay in connection with neurology as new information regarding the genetics of FTD continues to evolve.   It was reported that Lila maternal uncle passed away at age 30 due to leukemia. Her maternal grandfather was reported to have colon cancer in his 50's and later kidney cancer. Melody's father was reported to have prostate cancer in his 60's. Her paternal uncle was reported to have melanoma and passed away. Her paternal grandfather passed away at age 92 and was reported to have cancer, however more specific details were not known at time of today's visit. Gary's father passed away in his 40's due to kidney cancer. We discussed how most cancer seen in families occurs sporadically, but about 5-10% may be due to an underlying genetic etiology. The couple was encouraged to share this family history information with their primary care providers to ensure appropriate screening. They were also made aware of the [facility]'s cancer risk management clinic if they or their family members are interested in more information.  It was reported that Melody had a maternal aunt who passed away at 7 weeks due to SIDS. It is possible that this relative passed away due to an underlying congenital abnormality (such as a cardiac defect), a genetic syndrome (such as a metabolic diease or other syndrome), or potentially an infection. However, in the absence of an identified cause, risk assessment is challenging.     Otherwise, the reported family history is unremarkable for multiple miscarriages, stillbirths, birth defects, intellectual disabilities, known genetic conditions, and consanguinity.       CARRIER SCREENING   Expanded carrier  screening is available to screen for autosomal recessive conditions and X-linked conditions in a large list of genes. Autosomal recessive conditions happen when a mutation has been inherited from the egg and sperm and include conditions like cystic fibrosis, thalassemia, hearing loss, spinal muscular atrophy, and more. X-linked conditions happen when a mutation has been inherited from the egg and include conditions like fragile X syndrome. [place] screening was also reviewed.    We discussed that expanded carrier screening is designed to identify carrier status for conditions that are primarily childhood or adolescent onset. Expanded carrier screening does not evaluate for adult-onset conditions such as hereditary cancer syndromes, dementia (such as FTD)/ Alzheimer's disease, or cardiovascular disease risk factors. Additionally, expanded carrier screening is not comprehensive for all known genetic diseases or inherited conditions. This is a screening test, and residual carrier status risk figures will be provided to the patient after results become available. Carrier screening is not meant to diagnose the patient with a condition, and generally carriers are asymptomatic. However, certain genes may confer increased risks for various health concerns in carriers (DMD, FMR1, etc). Patients are encouraged to share results with their primary care providers to ensure appropriate screening. If we are notified by the performing laboratory of a variant reclassification, the patient will be contacted.     We briefly reviewed RACHEL (Genetic Information Nondiscrimination Act). RACHEL is a federal law that protects individuals from health insurance or employment discrimination based on a genetic test result alone.  There are currently no legal discrimination protections in terms of life insurance, long term care, or disability insurances. There are conditions included on carrier screening which may have health implications for  individuals as carriers. Melody verbalized understanding and has elected to pursue testing.     We reviewed different panel sizes. Melody elected to pursue [employer] expanded carrier screening through [facility] lab for 267 conditions. Results are expected within 3-4 weeks and we will contact her once available. If positive, Gary may opt to pursue testing.        RISK ASSESSMENT FOR CHROMOSOME CONDITIONS   We explained that the risk for fetal chromosome abnormalities increases with maternal age. We discussed specific features of common chromosome abnormalities, including Down syndrome, trisomy 13, trisomy 18, and sex chromosome trisomies.    At age 29 at midtrimester, the risk to have a baby with Down syndrome is 1 in 597.  At age 29 at midtrimester, the risk to have a baby with any chromosome abnormality is 1 in 299.     GENETIC TESTING OPTIONS   Genetic testing during a pregnancy includes screening and diagnostic procedures.      We discussed the following screening options:   Non-invasive prenatal testing (NIPT)  Also called cell-free DNA screening because it detects chromosomes from the placenta in the pregnant person's blood  Can be done any time after 10 weeks gestation  Screens for trisomy 21, trisomy 18, trisomy 13, and sex chromosome aneuploidies  Cannot screen for open neural tube defects, maternal serum AFP after 15 weeks is recommended  We also discussed that current ACMG guidelines recommend that screening for 22q11.2 deletion syndrome be offered to all pregnant patients. 22q11.2 deletion syndrome has an estimated prevalence of 1 in 990 to 1 in 2148 (0.05-0.1%). Risk is not thought to increase with maternal age. Clinical features are variable but include congenital heart defects, cleft palate, developmental delays, immune system deficiencies, and hearing loss. Approximately 90% of cases are de spenser (a sporadic new change in a pregnancy). Cell-free DNA screening for 22q11.2 deletion syndrome is available with  the inclusion of other microdeletion syndromes that are not currently recommended by society guidelines. We discussed the limitations of cell-free DNA screening in detecting microdeletions and the possiblity of false positives and false negatives.      We discussed the following ultrasound options:  Nuchal translucency (NT) ultrasound  Ultrasound between [id number]-49z6d that includes nuchal translucency measurement and nasal bone assessments  Nuchal translucency refers to the space at the back of the neck where fluid builds up. All babies at this stage have fluid and there is only concern if there is too much fluid  Nasal bone refers to the small bone in the nose. There is concern for conditions like Down syndrome if the bone cannot be seen at all  This ultrasound can be done as part of first trimester screening, at the same time as another screen (NIPT), at the same time as a CVS, or if the patients does not want genetic screening.  Markers on ultrasound detects about 70% of pregnancies with aneuploidy  Abnormalities on NT ultrasound can also increase the risk for a birth defect, like a heart defect    We discussed the following diagnostic options:   Chorionic villus sampling (CVS)  Invasive diagnostic procedure done between 10w0d and 13w6d  The procedure collects a small sample from the placenta for the purpose of chromosomal testing and/or other genetic testing  Diagnostic result; more than 99% sensitivity for fetal chromosome abnormalities  Cannot screen for open neural tube defects, maternal serum AFP after 15 weeks is recommended    Amniocentesis  Invasive diagnostic procedure done after 15 weeks gestation  The procedure collects a small sample of amniotic fluid for the purpose of chromosomal testing and/or other genetic testing  Diagnostic result; more than 99% sensitivity for fetal chromosome abnormalities  Testing for AFP in the amniotic fluid can test for open neural tube defects    The benefits and  limitations of noninvasive screening were reviewed. Screening tests provide a risk assessment (chance) specific to the pregnancy for certain fetal chromosome abnormalities but cannot definitively diagnose or exclude a fetal chromosome abnormality. Follow-up genetic counseling and consideration of diagnostic testing is recommended with any abnormal screening result. Diagnostic testing during a pregnancy is more certain and can test for more conditions. However, the tests do have a risk of miscarriage that requires careful consideration. These tests can detect fetal chromosome abnormalities with greater than 99% certainty. Results can be compromised by maternal cell contamination or mosaicism and are limited by the resolution of current genetic testing technology. There is no screening or diagnostic test that detects all forms of birth defects or intellectual disability.     It was a pleasure to be involved with [facility]. Face-to-face time of the meeting was 45 minutes.    Yesika Beckham MS, [facility]  Licensed Genetic Counselor  [facility]  Maternal Fetal Medicine  Ph: [phone]  [email or web address]

## 2024-09-19 ENCOUNTER — TELEPHONE (OUTPATIENT)
Dept: MATERNAL FETAL MEDICINE | Facility: CLINIC | Age: 29
End: 2024-09-19
Payer: COMMERCIAL

## 2024-09-19 NOTE — TELEPHONE ENCOUNTER
September 19, 2024  I spoke with Melody regarding her Prequel  NIPT results.     Results indicate NO ANEUPLOIDY DETECTED for chromosomes 21, 18, 13, or sex chromosomes. Testing for the select microdeletions was also included and screen negative. Predicted sex of baby was not disclosed per patient request.     This puts her current pregnancy at low risk for Down syndrome, trisomy 18, trisomy 13 and sex chromosome abnormalities. This test is reported to have the following sensitivities: Down syndrome: 99.7%, trisomy 18: 97.9%, and trisomy 13: 99.0%. Although these results are reassuring, this does not replace a standard chromosome analysis from a chorionic villus sampling or amniocentesis.      MSAFP is the appropriate second trimester screening test for open neural tube defects; the maternal quad screen is not recommended.     Her results are available in her Epic chart for her primary OB to review.     Yesika Beckham MS, [facility]  Licensed Genetic Counselor  [facility]  Maternal Fetal Medicine  Ph: [phone]  [email or web address]

## 2024-09-20 LAB — SCANNED LAB RESULT: NORMAL

## 2024-09-25 ENCOUNTER — TELEPHONE (OUTPATIENT)
Dept: MATERNAL FETAL MEDICINE | Facility: CLINIC | Age: 29
End: 2024-09-25
Payer: COMMERCIAL

## 2024-09-25 LAB — SCANNED LAB RESULT: ABNORMAL

## 2024-09-25 NOTE — TELEPHONE ENCOUNTER
September 25, 2024    I left a voicemail for Melody with her expanded carrier screening results. Melody was found to be a carrier for two of the conditions on the [facility] 267 condition panel.     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 Alpha-mannosidosis is an inherited disease that can cause intellectual disability, skeletal abnormalities, hearing loss, muscle weakness, coarse facial features, increased susceptibility to infection, and problems with controlling body movement. This disease, caused by mutations in the MAN2B1 gene, blocks an enzyme that breaks down the sugar mannose, leading to abnormal accumulation of specific compounds called glycoproteins. The accumulation of sugar-binding glycoproteins results in organ and tissue damage, and the symptoms associated with alpha-mannosidosis. This is an autosomal recessive condition and reproductive risk would depend on Gary's carrier status. Carrier screening is available for him.     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 Pseudocholinesterase deficiency is a condition that causes individuals to have problems with certain anesthesia medications. . Individuals with pseudocholinesterase deficiency do not have enough of an enzyme called pseudocholinesterase, which is needed to break down medications such as succinylcholine or mivacurium properly. These medications are often used during surgery to help relax muscles in the body. Individuals who have pseudocholinesterase deficiency may have muscle paralysis for a longer time than most people after surgery. They may also stop breathing after taking these medications. Carriers generally do not have symptoms, but they may sometimes experience a short period of breathing paralysis following anesthesia. Reviewed that this is very important to share with her primary care providers and any care team ahead of receiving anesthesia. This is an  autosomal recessive condition and reproductive risk would depend on Gary's carrier status. Carrier screening is available for him.     First degree relatives including the couple's children have a 50% chance of also being carriers for the same conditions and this information should be shared with them.     This information was left in Melody's voicemail per plan established at her genetic counseling appointment. Reviewed that carrier testing is available for Melody's partner, Gary and they can reach out at any time to help coordinate.     Yesika Beckham MS, [facility]  Licensed Genetic Counselor  [facility]  Maternal Fetal Medicine  Ph: [phone]  [email or web address]

## 2024-09-26 NOTE — TELEPHONE ENCOUNTER
September 26, 2024    Received voicemail from patient interested in coordinating carrier testing for her partner, Gary. I spoke with patient and we reviewed plan to have Gary reach out to me to coordinate a virtual appointment for consent and then we will bring collection kit to [facility] lab ahead of planned draw. Melody provided verbal permission for us to discuss her result with Gary to assist with coordinating his testing.     Yesika Beckham MS, [facility]  Licensed Genetic Counselor  [facility]  Maternal Fetal Medicine  Ph: [phone]  [email or web address]

## 2024-10-07 ENCOUNTER — PRENATAL OFFICE VISIT (OUTPATIENT)
Dept: OBGYN | Facility: CLINIC | Age: 29
End: 2024-10-07
Payer: COMMERCIAL

## 2024-10-07 VITALS
DIASTOLIC BLOOD PRESSURE: 64 MMHG | TEMPERATURE: 97.4 F | HEIGHT: 65 IN | BODY MASS INDEX: 21.73 KG/M2 | WEIGHT: 130.4 LBS | HEART RATE: 70 BPM | OXYGEN SATURATION: 100 % | SYSTOLIC BLOOD PRESSURE: 101 MMHG

## 2024-10-07 DIAGNOSIS — Z34.02 ENCOUNTER FOR SUPERVISION OF NORMAL FIRST PREGNANCY IN SECOND TRIMESTER: Primary | ICD-10-CM

## 2024-10-07 LAB
ERYTHROCYTE [DISTWIDTH] IN BLOOD BY AUTOMATED COUNT: 12.9 % (ref 10–15)
FERRITIN SERPL-MCNC: 49 NG/ML (ref 6–175)
HCT VFR BLD AUTO: 35.3 % (ref 35–47)
HGB BLD-MCNC: 12.3 G/DL (ref 11.7–15.7)
HOLD SPECIMEN: NORMAL
IRON BINDING CAPACITY (ROCHE): 285 UG/DL (ref 240–430)
IRON SATN MFR SERPL: 40 % (ref 15–46)
IRON SERPL-MCNC: 113 UG/DL (ref 37–145)
MCH RBC QN AUTO: 33.6 PG (ref 26.5–33)
MCHC RBC AUTO-ENTMCNC: 34.8 G/DL (ref 31.5–36.5)
MCV RBC AUTO: 96 FL (ref 78–100)
PLATELET # BLD AUTO: 200 10E3/UL (ref 150–450)
RBC # BLD AUTO: 3.66 10E6/UL (ref 3.8–5.2)
T PALLIDUM AB SER QL: NONREACTIVE
WBC # BLD AUTO: 10.3 10E3/UL (ref 4–11)

## 2024-10-07 PROCEDURE — 85027 COMPLETE CBC AUTOMATED: CPT | Performed by: OBSTETRICS & GYNECOLOGY

## 2024-10-07 PROCEDURE — 83550 IRON BINDING TEST: CPT | Performed by: OBSTETRICS & GYNECOLOGY

## 2024-10-07 PROCEDURE — 82105 ALPHA-FETOPROTEIN SERUM: CPT | Mod: 90 | Performed by: OBSTETRICS & GYNECOLOGY

## 2024-10-07 PROCEDURE — 82728 ASSAY OF FERRITIN: CPT | Performed by: OBSTETRICS & GYNECOLOGY

## 2024-10-07 PROCEDURE — 99000 SPECIMEN HANDLING OFFICE-LAB: CPT | Performed by: OBSTETRICS & GYNECOLOGY

## 2024-10-07 PROCEDURE — 99207 PR PRENATAL VISIT: CPT | Performed by: OBSTETRICS & GYNECOLOGY

## 2024-10-07 PROCEDURE — 36415 COLL VENOUS BLD VENIPUNCTURE: CPT | Performed by: OBSTETRICS & GYNECOLOGY

## 2024-10-07 PROCEDURE — 86780 TREPONEMA PALLIDUM: CPT | Performed by: OBSTETRICS & GYNECOLOGY

## 2024-10-07 PROCEDURE — 83540 ASSAY OF IRON: CPT | Performed by: OBSTETRICS & GYNECOLOGY

## 2024-10-07 NOTE — PROGRESS NOTES
15w4d  Doing well.  No particular concerns today.  Happy to hear heartbeat.  msAFP today.  Placed future orders for CBC, GCT and Trep in the event she wants to do that before her 26w on 12/24.  RTC with [facility] on 10/28  Aarti Flowers MD

## 2024-10-10 LAB
# FETUSES US: NORMAL
AFP MOM SERPL: 0.82
AFP SERPL-MCNC: 29 NG/ML
AGE - REPORTED: 31.7 YR
CURRENT SMOKER: NO
FAMILY MEMBER DISEASES HX: NO
GA METHOD: NORMAL
GA: NORMAL WK
IDDM PATIENT QL: NO
INTEGRATED SCN PATIENT-IMP: NORMAL
SPECIMEN DRAWN SERPL: NORMAL

## 2024-10-28 ENCOUNTER — ANCILLARY PROCEDURE (OUTPATIENT)
Dept: ULTRASOUND IMAGING | Facility: CLINIC | Age: 29
End: 2024-10-28
Attending: OBSTETRICS & GYNECOLOGY
Payer: COMMERCIAL

## 2024-10-28 ENCOUNTER — PRENATAL OFFICE VISIT (OUTPATIENT)
Dept: OBGYN | Facility: CLINIC | Age: 29
End: 2024-10-28
Attending: OBSTETRICS & GYNECOLOGY
Payer: COMMERCIAL

## 2024-10-28 VITALS
HEART RATE: 63 BPM | WEIGHT: 134.8 LBS | BODY MASS INDEX: 22.43 KG/M2 | SYSTOLIC BLOOD PRESSURE: 98 MMHG | TEMPERATURE: 98.4 F | OXYGEN SATURATION: 100 % | DIASTOLIC BLOOD PRESSURE: 64 MMHG

## 2024-10-28 DIAGNOSIS — Z34.02 ENCOUNTER FOR SUPERVISION OF NORMAL FIRST PREGNANCY IN SECOND TRIMESTER: Primary | ICD-10-CM

## 2024-10-28 PROCEDURE — 99207 PR PRENATAL VISIT: CPT | Performed by: OBSTETRICS & GYNECOLOGY

## 2024-10-28 PROCEDURE — 76805 OB US >/= 14 WKS SNGL FETUS: CPT | Performed by: OBSTETRICS & GYNECOLOGY

## 2024-10-28 NOTE — PATIENT INSTRUCTIONS
"Weeks 14 to 18 of Your Pregnancy: Care Instructions  Around this time, you may start to look pregnant. Your baby is now able to pass urine. And the first stool (meconium) is starting to collect in your baby's intestines. Hair is starting to grow on your baby's head.    You may notice some skin changes, such as itchy spots on your palms or acne on your face.   At your next doctor visit, you may have an ultrasound. So you might think about whether you want to know the sex of your baby. Also ask your doctor about flu and COVID-19 shots.      How to reduce stress   Ask for help when you need it.  Try to avoid things that cause you stress.  Seek out things that relieve stress, such as breathing exercises or yoga.     How to get exercise   If you don't usually exercise, start slowly. Short walks may be a good choice.  Try to be active 30 minutes a day, at least 5 days a week.  Avoid activities where you're more likely to fall.  Use light weights to reduce stress on your joints.     How to stay at a healthy weight for you   Talk to your doctor or midwife about how much weight you should gain.  It's generally best to gain:  About 28 to 40 pounds if you're underweight.  About 25 to 35 pounds if you're at a healthy weight.  About 15 to 25 pounds if you're overweight.  About 11 to 20 pounds if you're very overweight (obese).  Follow-up care is a key part of your treatment and safety. Be sure to make and go to all appointments, and call your doctor if you are having problems. It's also a good idea to know your test results and keep a list of the medicines you take.  Where can you learn more?  Go to https://www.ThirdPresence.net/patiented  Enter I453 in the search box to learn more about \"Weeks 14 to 18 of Your Pregnancy: Care Instructions.\"  Current as of: July 10, 2023  Content Version: 14.2 2024 [employer].   Care instructions adapted under license by your healthcare professional. If you have questions about a " medical condition or this instruction, always ask your healthcare professional. Healthwise, [facility] disclaims any warranty or liability for your use of this information.    Second-Trimester Fetal Ultrasound: About This Test  What is it?     Fetal ultrasound is a test that uses sound waves to make pictures of your baby (fetus) and placenta inside the uterus. The test is the safest way to find out the age, size, and position of your baby. You also may be able to find out the sex of your baby. (But the test isn't done just to find out a baby's sex.)  No known risks to the mother or the baby are linked to fetal ultrasound. But you may feel anxious if the test reveals a problem with your pregnancy or baby.  Why is this test done?  In the second trimester, a fetal ultrasound is done to:  Estimate the number of weeks and days a fetus has developed since the beginning of the pregnancy. This is called the gestational age.  Look at the size and position of the fetus, the placenta, and the fluid that surrounds the fetus.  Find major birth defects, such as heart problems or problems with the brain and spinal cord (neural tube defects). But the test may not be able to find many minor defects and some major birth defects.  How do you prepare for the test?  In general, there's nothing you have to do before this test, unless your doctor tells you to.  How is the test done?  You may be able to leave your clothes on, or you will be given a gown to wear.  You will lie on your back on a padded examination table.  A gel will be spread on your belly. It will be removed after the test.  A small, handheld device called a transducer will be pressed against the gel on your skin and moved across your belly several times.  You may watch the monitor to see the picture of your baby during the test.  What happens after the test?  You will probably be able to go home right away.  You most likely will be able to go back to your usual  "activities right away.  Follow-up care is a key part of your treatment and safety. Be sure to make and go to all appointments, and call your doctor if you are having problems. It's also a good idea to keep a list of the medicines you take. Ask your doctor when you can expect to have your test results.  Where can you learn more?  Go to https://www.[employer]ercate[employer].net/patiented  Enter Y671 in the search box to learn more about \"Second-Trimester Fetal Ultrasound: About This Test.\"  Current as of: July 10, 2023  Content Version: 14.2 2024 [employer].   Care instructions adapted under license by your healthcare professional. If you have questions about a medical condition or this instruction, always ask your healthcare professional. Healthwise, [employer] disclaims any warranty or liability for your use of this information.    During Pregnancy: Exercises  Introduction  Here are some examples of exercises to do during your pregnancy. Start each exercise slowly. Ease off the exercise if you start to have pain.  Talk to your doctor about when you can start these exercises and which ones will work best for you.  How to do the exercises  Neck rotation    Sit up straight in a firm chair, or stand up straight. If you're standing, keep your feet about hip-width apart.  Keeping your chin level, turn your head to the right and hold for 15 to 30 seconds.  Turn your head to the left and hold for 15 to 30 seconds.  Repeat 2 to 4 times.  Neck stretch to the front    Sit up straight in a firm chair, or stand up straight. Look straight ahead. If you're standing, keep your feet about hip-width apart.  Slowly bend your head forward without moving your shoulders.  Hold for 15 to 30 seconds, then return to your starting position.  Repeat 2 to 4 times.  Back press    Stand with your back 10 to 12 inches away from a wall.  Lean into the wall until your back is against it. Press your lower back against the wall by pulling in your " stomach muscles.  Slowly slide down until your knees are slightly bent, pressing your lower back against the wall.  Hold for at least 6 seconds, then slide back up the wall.  Repeat 8 to 12 times.  Over time, work up to holding this position for as much as 1 minute.  Trunk twist    Sit on the floor with your legs crossed. If that's not comfortable, you can sit on a folded blanket so your bottom is a few inches off the floor. Or you can sit on a chair with your knees hip-width apart and your feet flat on the floor.  Reach your left hand toward your right knee. You can place your right hand at your side for support.  Slowly twist your body (trunk) to your right.  Relax and return to your starting position.  Repeat 2 to 4 times.  Switch your hands and twist to your left.  Repeat 2 to 4 times.  Pelvic rocking on hands and knees    Start on your hands and knees. Place your wrists directly below your shoulders and your knees below your hips.  Breathe in slowly. Tuck your head downward and round your back up, making a curve with your back in the shape of the letter C. Hold this position for about 6 seconds.  Breathe out slowly and bring your head back up. Relax, keeping your back straight. (Don't allow it to curve toward the floor.) Hold for about 6 seconds.  Repeat 8 to 12 times, gently rocking your pelvis.  Pelvic tilt    Lie on your back with your knees bent and your feet flat on the floor.  Tighten your belly muscles by pulling your belly button in toward your spine. Press your lower back to the floor. You should feel your hips and pelvis rock back.  Hold for 6 seconds while breathing smoothly, and then relax.  Repeat 8 to 12 times.  Do this exercise only during the first 4 months of pregnancy. After this point, lying on your back is not recommended, because it can cause blood flow problems for you and your baby.  Backward stretch    Start on your hands and knees with your knees 8 to 10 inches apart, hands directly  below your shoulders, and arms and back straight.  Keeping your arms straight, slowly lower your buttocks toward your heels and tuck your head toward your knees. Hold for 15 to 30 seconds.  Slowly return to the starting position.  Repeat 2 to 4 times.  Forward bend    Sit comfortably in a chair, with your arms relaxed.  Slowly bend forward, allowing your arms to hang down. Lean only as far as you can without feeling discomfort or pressure on your belly.  Hold for 15 to 30 seconds and then slowly sit up straight.  Repeat 2 to 4 times or to your comfort level.  Donkey kick    Start on your hands and knees. Place your hands directly below your shoulders, and keep your arms straight.  Tighten your belly muscles by pulling your belly button in toward your spine. Keep breathing normally, and don't hold your breath.  Lift one knee and bring it toward your elbow.  Slowly extend that leg behind you without completely straightening it. Be careful not to let your hip drop down. Avoid arching your back.  Hold your leg behind you for about 6 seconds.  Return to your starting position.  Repeat 8 to 12 times for each leg.  Tailor sitting    Sit on the floor.  Bring your feet close to your body while crossing your ankles.  Keep your back straight. Relax your legs and let your knees drop toward the floor.  Hold this position for as long as you are comfortable.  Toe reach    Sit on the floor with your back straight, legs about 12 inches apart, and feet relaxed outward.  Stretch your hands forward toward your right foot, then sit up.  Stretch your hands straight forward, then sit up.  Stretch your hands forward toward your left foot, then sit up.  Hold each stretch for 15 to 30 seconds.  Repeat 2 to 4 times.  Follow-up care is a key part of your treatment and safety. Be sure to make and go to all appointments, and call your doctor if you are having problems. It's also a good idea to know your test results and keep a list of the  "medicines you take.  Current as of: July 10, 2023  Content Version: 14.2 2024 [facility] [employer].   Care instructions adapted under license by your healthcare professional. If you have questions about a medical condition or this instruction, always ask your healthcare professional. Healthwise, Incorporated disclaims any warranty or liability for your use of this information.    Weeks 18 to 22 of Your Pregnancy: Care Instructions  At this stage you may find that your nausea and fatigue are gone. You may feel better overall and have more energy. But you might now also have some new discomforts, like sleep problems or leg cramps.    You may start to feel your baby move. These movements can feel like butterflies or bubbles.   Babies at this stage can now suck their thumbs.     Get some exercise every day.  And avoid caffeine late in the day.     Take a warm shower or bath before bed.  Try relaxation exercises to calm your mind and body.     Use extra pillows.  They can help you get comfortable.     Don't use sleeping pills or alcohol.  They could harm your baby.     For leg cramps, stretch and apply heat.  A warm bath, leg warmers, a heating pad, or a hot water bottle can help with muscle aches.   Stretches for leg cramps    Straighten your leg and bend your foot (flex your ankle) slowly upward, toward your knee. Bend your toes up and down.   Stand on a flat surface. Stretch your toes upward. For balance, hold on to the wall or something stable. If it feels okay, take small steps walking on your heels.   Follow-up care is a key part of your treatment and safety. Be sure to make and go to all appointments, and call your doctor if you are having problems. It's also a good idea to know your test results and keep a list of the medicines you take.  Where can you learn more?  Go to https://www.[employer].net/patiented  Enter W603 in the search box to learn more about \"Weeks 18 to 22 of Your Pregnancy: Care " "Instructions.\"  Current as of: July 10, 2023  Content Version: 14.2 2024 [facility] [employer].   Care instructions adapted under license by your healthcare professional. If you have questions about a medical condition or this instruction, always ask your healthcare professional. Healthwise, [employer] disclaims any warranty or liability for your use of this information.    Weeks 22 to 26 of Your Pregnancy: Care Instructions  Your baby's lungs are getting ready for breathing. Your baby may respond to your voice. Your baby likely turns less, and kicks or jerks more. Jerking may mean that your baby has hiccups.    Think about taking childbirth classes. And start to think about whether you want to have pain medicine during labor.   At your next doctor visit, you may be tested for anemia and for high blood sugar that first occurs during pregnancy (gestational diabetes). These conditions can cause problems for you and your baby.         To ease discomfort, such as back pain   Change your position often. Try not to sit or stand for too long.  Get some exercise. Things like walking or stretching may help.  Try using a heating pad or cold pack.        To ease or reduce swelling in your feet, ankles, hands, and fingers   Take off your rings.  Avoid high-sodium foods, such as potato chips.  Prop up your feet, and sleep with pillows under your feet.  Try to avoid standing for long periods of time.  Do not wear tight shoes.  Wear support stockings.  Kegel exercises to prevent urine from leaking    Squeeze your muscles as if you were trying not to pass gas. Your belly, legs, and buttocks shouldn't move. Hold the squeeze for 3 seconds, then relax for 5 to 10 seconds.    Add 1 second each week until you can squeeze for 10 seconds. Repeat the exercise 10 times a session. Do 3 to 8 sessions a day. If these exercises cause you pain, stop doing them and talk with your doctor.  Follow-up care is a key part of your treatment and " "safety. Be sure to make and go to all appointments, and call your doctor if you are having problems. It's also a good idea to know your test results and keep a list of the medicines you take.  Where can you learn more?  Go to https://www.[employer].net/patiented  Enter G264 in the search box to learn more about \"Weeks 22 to 26 of Your Pregnancy: Care Instructions.\"  Current as of: July 10, 2023  Content Version: 14.2 2024 [employer].   Care instructions adapted under license by your healthcare professional. If you have questions about a medical condition or this instruction, always ask your healthcare professional. Healthwise, [employer] disclaims any warranty or liability for your use of this information.    Round Ligament Pain: Care Instructions  Overview     Round ligament pain is a common pain during pregnancy. You may feel a sharp brief pain on one or both sides of your belly. It may go down into your groin. It's usually felt for the first time during the second trimester. This pain is a normal part of pregnancy.  Your uterus is supported by two ligaments that go from the top and sides of the uterus to the bones of the pelvis. These are the round ligaments. As your uterus grows, these ligaments stretch and tighten with your movements. This may be the cause of the pain. You may find that certain activities seem to cause pain. If you can, avoid those activities.  Your doctor can usually diagnose round ligament pain from your symptoms and an exam. If you have bleeding or other symptoms, your doctor may also do an imaging test, such as an ultrasound. Your doctor may suggest some things that can help the pain, such as rest and strengthening exercises.  Follow-up care is a key part of your treatment and safety. Be sure to make and go to all appointments, and call your doctor if you are having problems. It's also a good idea to know your test results and keep a list of the medicines you take.  How can " "you care for yourself at home?  If certain movements seem to trigger belly pain, see if you can avoid them or try moving more slowly so the ligaments don't stretch quickly.  Stay active. If your doctor says it's okay, try moderate exercise. You might try things like swimming, walking, or stretching. Ask your doctor about strengthening and stretching exercises that may help.  Try a heating pad or cold pack on the area. A warm bath or shower may also help.  Rest when you can.  Ask your doctor about taking acetaminophen for pain. Be safe with medicines. Read and follow all instructions on the label.  Try a belly support band. Some people find that these can help.  When should you call for help?   Call your doctor now or seek immediate medical care if:    You think you might be in labor.     You have new or worse pain.   Watch closely for changes in your health, and be sure to contact your doctor if you have any problems.  Where can you learn more?  Go to https://www.HowGood.net/patiented  Enter R110 in the search box to learn more about \"Round Ligament Pain: Care Instructions.\"  Current as of: July 10, 2023  Content Version: 14.2 2024 [employer].   Care instructions adapted under license by your healthcare professional. If you have questions about a medical condition or this instruction, always ask your healthcare professional. Healthwise, Inc[employer] disclaims any warranty or liability for your use of this information.    Leg and Ankle Edema: Care Instructions  Your Care Instructions  Swelling in the legs, ankles, and feet is called edema. It is common after you sit or stand for a while. Long plane flights or car rides often cause swelling in the legs and feet. You may also have swelling if you have to stand for long periods of time at your job. Problems with the veins in the legs (varicose veins) and changes in hormones can also cause swelling. Sometimes the swelling in the ankles and feet is " caused by a more serious problem, such as heart failure, infection, blood clots, or liver or kidney disease.  Follow-up care is a key part of your treatment and safety. Be sure to make and go to all appointments, and call your doctor if you are having problems. It's also a good idea to know your test results and keep a list of the medicines you take.  How can you care for yourself at home?  If your doctor gave you medicine, take it as prescribed. Call your doctor if you think you are having a problem with your medicine.  Whenever you are resting, raise your legs up. Try to keep the swollen area higher than the level of your heart.  Take breaks from standing or sitting in one position.  Walk around to increase the blood flow in your lower legs.  Move your feet and ankles often while you stand, or tighten and relax your leg muscles.  Wear support stockings. Put them on in the morning, before swelling gets worse.  Eat a balanced diet. Lose weight if you need to.  Limit the amount of salt (sodium) in your diet. Salt holds fluid in the body and may increase swelling.  When should you call for help?   Call 911 anytime you think you may need emergency care. For example, call if:    You have symptoms of a blood clot in your lung (called a pulmonary embolism). These may include:  Sudden chest pain.  Trouble breathing.  Coughing up blood.   Call your doctor now or seek immediate medical care if:    You have signs of a blood clot, such as:  Pain in your calf, back of the knee, thigh, or groin.  Redness and swelling in your leg or groin.     You have symptoms of infection, such as:  Increased pain, swelling, warmth, or redness.  Red streaks or pus.  A fever.   Watch closely for changes in your health, and be sure to contact your doctor if:    Your swelling is getting worse.     You have new or worsening pain in your legs.     You do not get better as expected.   Where can you learn more?  Go to  "https://www.[employer].net/patiented  Enter N696 in the search box to learn more about \"Leg and Ankle Edema: Care Instructions.\"  Current as of: August 6, 2023  Content Version: 14.2 2024 [employer].   Care instructions adapted under license by your healthcare professional. If you have questions about a medical condition or this instruction, always ask your healthcare professional. Healthwise, Inc[employer] disclaims any warranty or liability for your use of this information.    Back Pain During Pregnancy: Care Instructions  Overview     Back pain has many possible causes. It is often caused by problems with muscles and ligaments in your back. The extra weight during pregnancy can put stress on your back. Moving, lifting, standing, sitting, or sleeping in an awkward way also can strain your back. Back pain can also be a sign of labor. Although it may hurt a lot, back pain often improves on its own. Use good home treatment, and take care not to stress your back.  Follow-up care is a key part of your treatment and safety. Be sure to make and go to all appointments, and call your doctor if you are having problems. It's also a good idea to know your test results and keep a list of the medicines you take.  How can you care for yourself at home?  Ask your doctor about taking acetaminophen (Tylenol) for pain. Do not take aspirin, ibuprofen (Advil, Motrin), or naproxen (Aleve).  Do not take two or more pain medicines at the same time unless the doctor told you to. Many pain medicines have acetaminophen, which is Tylenol. Too much acetaminophen (Tylenol) can be harmful.  Try heat or ice, whichever feels better. Apply it for 10 to 20 minutes at a time, several times a day. Put a thin cloth between the heat or ice and your skin. A warm bath or shower may also help.  Lie on your left side with your knees and hips bent and a pillow between your legs. This reduces stress on your back.  Try to avoid standing or " "sitting for too long or heavy lifting. If your job requires lots of standing, sitting, or heavy lifting, ask your employer if you can take short breaks or adjust your work activity. You can ask your doctor to write a note requesting these breaks or other adjustments.  Wear supportive, low-heeled shoes. Avoid flat or high-heeled shoes.  Try a belly support band. Supporting your belly can take the strain off your back.  Ask your doctor about how much exercise you can do. Regular exercise such as swimming, water aerobics, walking, or stretching can help with back pain.  Ask your doctor about exercises to stretch, strengthen, and relax your muscles. Your doctor may recommend physical therapy.  When should you call for help?   Call your doctor now or seek immediate medical care if:    You've been having regular contractions for an hour. This means that you've had at least 6 contractions within 1 hour, even after you change your position and drink fluids.     You have new numbness in your buttocks, genital or rectal areas, or legs.     You have a new loss of bowel or bladder control.     You have symptoms of a urinary tract infection. These may include:  Pain or burning when you urinate.  A frequent need to urinate without being able to pass much urine.  Pain in the flank, which is just below the rib cage and above the waist on either side of the back.  Blood in your urine.   Watch closely for changes in your health, and be sure to contact your doctor if:    You do not get better as expected.   Where can you learn more?  Go to https://www.Medstro.net/patiented  Enter C696 in the search box to learn more about \"Back Pain During Pregnancy: Care Instructions.\"  Current as of: July 10, 2023  Content Version: 14.2 2024 [facility] [employer].   Care instructions adapted under license by your healthcare professional. If you have questions about a medical condition or this instruction, always ask your healthcare " professional. Healthwise, Incorporated disclaims any warranty or liability for your use of this information.     Labor: Care Instructions  Overview      labor is the start of labor between 20 and 36 weeks of pregnancy. Most babies are born at 37 to 42 weeks of pregnancy. In labor, the uterus contracts to open the cervix. This is the first stage of childbirth.  labor can be caused by a problem with the baby, the mother, or both. Often the cause is not known.  In some cases, doctors use medicines to try to delay labor until 34 or more weeks of pregnancy. By this time, a baby has grown enough so that problems are not likely. In some cases--such as with a serious infection--it is healthier for the baby to be born early. Your treatment will depend on how far along you are in your pregnancy and on your health and your baby's health.  Follow-up care is a key part of your treatment and safety. Be sure to make and go to all appointments, and call your doctor if you are having problems. It's also a good idea to know your test results and keep a list of the medicines you take.  How can you care for yourself at home?  If your doctor prescribed medicines, take them exactly as directed. Call your doctor if you think you are having a problem with your medicine.  Rest until your doctor advises you about activity.  Do not have sexual intercourse unless your doctor says it is safe.  Use sanitary pads if you have vaginal bleeding. Using pads makes it easier to monitor your bleeding.  Do not smoke or allow others to smoke around you. If you need help quitting, talk to your doctor about stop-smoking programs and medicines. These can increase your chances of quitting for good.  When should you call for help?   Call 911  anytime you think you may need emergency care. For example, call if:    You passed out (lost consciousness).     You have a seizure.     You have severe vaginal bleeding.     You have severe pain in  "your belly or pelvis that doesn't get better between contractions.     You have had fluid gushing or leaking from your vagina and you know or think the umbilical cord is bulging into your vagina. If this happens, immediately get down on your knees so your rear end (buttocks) is higher than your head. This will decrease the pressure on the cord until help arrives.   Call your doctor now or seek immediate medical care if:    You have signs of preeclampsia, such as:  Sudden swelling of your face, hands, or feet.  New vision problems (such as dimness, blurring, or seeing spots).  A severe headache.     You have any vaginal bleeding.     You have belly pain or cramping.     You have a fever.     You have had regular contractions (with or without pain) for an hour. This means that you have 6 or more within 1 hour after you change your position and drink fluids.     You have a sudden release of fluid from the vagina.     You have low back pain or pelvic pressure that does not go away.     You notice that your baby has stopped moving or is moving much less than normal.   Watch closely for changes in your health, and be sure to contact your doctor if you have any problems.  Where can you learn more?  Go to https://www.[employer].net/patiented  Enter Q400 in the search box to learn more about \" Labor: Care Instructions.\"  Current as of: July 10, 2023  Content Version: 14.2024 [facility] [employer].   Care instructions adapted under license by your healthcare professional. If you have questions about a medical condition or this instruction, always ask your healthcare professional. [employer] disclaims any warranty or liability for your use of this information.    "

## 2024-10-28 NOTE — PROGRESS NOTES
18w4d   Feeling well.  Has not felt definite movement yet.    Normal AFP test.  FAS done today and prelim is normal.    Did not find out the gender.   Reviewed weight gain chart.   discussed GCT at 24 wks.   RR

## 2024-11-25 ENCOUNTER — DOCUMENTATION ONLY (OUTPATIENT)
Dept: NURSING | Facility: CLINIC | Age: 29
End: 2024-11-25

## 2024-11-25 ENCOUNTER — PRENATAL OFFICE VISIT (OUTPATIENT)
Dept: OBGYN | Facility: CLINIC | Age: 29
End: 2024-11-25
Payer: COMMERCIAL

## 2024-11-25 VITALS
WEIGHT: 135.9 LBS | DIASTOLIC BLOOD PRESSURE: 64 MMHG | BODY MASS INDEX: 22.61 KG/M2 | HEART RATE: 75 BPM | SYSTOLIC BLOOD PRESSURE: 116 MMHG | OXYGEN SATURATION: 100 %

## 2024-11-25 DIAGNOSIS — Z34.02 ENCOUNTER FOR SUPERVISION OF NORMAL FIRST PREGNANCY IN SECOND TRIMESTER: Primary | ICD-10-CM

## 2024-11-25 PROCEDURE — 99207 PR PRENATAL VISIT: CPT | Performed by: OBSTETRICS & GYNECOLOGY

## 2024-11-25 NOTE — PROGRESS NOTES
Scripts received from [employer] for Breast pump, SI support, and compression stockings  Scripts signed and faxed back to [employer].   Cathleen Key RN on 11/25/2024 at 3:20 PM

## 2024-11-25 NOTE — PROGRESS NOTES
Discussed sleeping positions. Feeling movement now. Has thanksgiving off this year. Wt gain graph reviewed and normal. Has appts scheduled and discussed GCT. BE

## 2024-12-24 ENCOUNTER — PRENATAL OFFICE VISIT (OUTPATIENT)
Dept: OBGYN | Facility: CLINIC | Age: 29
End: 2024-12-24
Payer: COMMERCIAL

## 2024-12-24 VITALS
BODY MASS INDEX: 24.11 KG/M2 | DIASTOLIC BLOOD PRESSURE: 58 MMHG | HEART RATE: 71 BPM | OXYGEN SATURATION: 100 % | WEIGHT: 144.9 LBS | SYSTOLIC BLOOD PRESSURE: 98 MMHG

## 2024-12-24 DIAGNOSIS — Z34.02 ENCOUNTER FOR SUPERVISION OF NORMAL FIRST PREGNANCY IN SECOND TRIMESTER: Primary | ICD-10-CM

## 2024-12-24 LAB
ERYTHROCYTE [DISTWIDTH] IN BLOOD BY AUTOMATED COUNT: 12.8 % (ref 10–15)
FERRITIN SERPL-MCNC: 20 NG/ML (ref 6–175)
HCT VFR BLD AUTO: 37.9 % (ref 35–47)
HGB BLD-MCNC: 12.9 G/DL (ref 11.7–15.7)
HOLD SPECIMEN: NORMAL
IRON BINDING CAPACITY (ROCHE): 382 UG/DL (ref 240–430)
IRON SATN MFR SERPL: 48 % (ref 15–46)
IRON SERPL-MCNC: 182 UG/DL (ref 37–145)
MCH RBC QN AUTO: 33.2 PG (ref 26.5–33)
MCHC RBC AUTO-ENTMCNC: 34 G/DL (ref 31.5–36.5)
MCV RBC AUTO: 97 FL (ref 78–100)
PLATELET # BLD AUTO: 202 10E3/UL (ref 150–450)
RBC # BLD AUTO: 3.89 10E6/UL (ref 3.8–5.2)
WBC # BLD AUTO: 7.1 10E3/UL (ref 4–11)

## 2024-12-24 PROCEDURE — 99207 PR PRENATAL VISIT: CPT | Performed by: OBSTETRICS & GYNECOLOGY

## 2024-12-24 PROCEDURE — 36415 COLL VENOUS BLD VENIPUNCTURE: CPT | Performed by: OBSTETRICS & GYNECOLOGY

## 2024-12-24 PROCEDURE — 82728 ASSAY OF FERRITIN: CPT | Performed by: OBSTETRICS & GYNECOLOGY

## 2024-12-24 PROCEDURE — 86780 TREPONEMA PALLIDUM: CPT | Performed by: OBSTETRICS & GYNECOLOGY

## 2024-12-24 PROCEDURE — 83550 IRON BINDING TEST: CPT | Performed by: OBSTETRICS & GYNECOLOGY

## 2024-12-24 PROCEDURE — 85027 COMPLETE CBC AUTOMATED: CPT | Performed by: OBSTETRICS & GYNECOLOGY

## 2024-12-24 PROCEDURE — 83540 ASSAY OF IRON: CPT | Performed by: OBSTETRICS & GYNECOLOGY

## 2024-12-24 NOTE — PROGRESS NOTES
Return OB visit    Subjective:  Patient reports active fetal movement, no vaginal bleeding or leaking fluid. She denies contractions. She has no concerns today.        Objective:  BP 98/58 (BP Location: Left arm, Patient Position: Sitting, Cuff Size: Adult Regular)   Pulse 71   Wt 65.7 kg (144 lb 14.4 oz)   LMP 2024   SpO2 100%   BMI 24.11 kg/m     See OB flow sheet    Assessment and Plan    Melody Cash is a 29 year old  at 26w5d here for RAISSA visit, pregnancy uncomplicated     This visit:  GTT results: Normal   Hemoglobin: Not done, will draw today    Rhogam: N/A   Tdap: Next visit   Childbirth classes? Completed [employer] parenting classes   Plan on breastfeeding? Yes: has pump   Birthcontrol? Considering IUD  Sex on ultrasound? did not determine  Circumsion? Yes if boy   Peds doc? Working on it     Next visit:  -Routine PNC, Tdap     RTC in 4 weeks or sooner AUGEI Sharma MD

## 2024-12-25 LAB — T PALLIDUM AB SER QL: NONREACTIVE

## 2025-01-27 ENCOUNTER — PRENATAL OFFICE VISIT (OUTPATIENT)
Dept: OBGYN | Facility: CLINIC | Age: 30
End: 2025-01-27
Payer: COMMERCIAL

## 2025-01-27 VITALS
HEART RATE: 70 BPM | SYSTOLIC BLOOD PRESSURE: 104 MMHG | WEIGHT: 148.6 LBS | DIASTOLIC BLOOD PRESSURE: 63 MMHG | OXYGEN SATURATION: 100 % | BODY MASS INDEX: 24.73 KG/M2

## 2025-01-27 DIAGNOSIS — Z34.83 ENCOUNTER FOR SUPERVISION OF OTHER NORMAL PREGNANCY, THIRD TRIMESTER: Primary | ICD-10-CM

## 2025-01-27 PROCEDURE — 99207 PR PRENATAL VISIT: CPT

## 2025-01-27 NOTE — PROGRESS NOTES
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

## 2025-01-30 ENCOUNTER — ALLIED HEALTH/NURSE VISIT (OUTPATIENT)
Dept: OBGYN | Facility: CLINIC | Age: 30
End: 2025-01-30
Payer: COMMERCIAL

## 2025-01-30 DIAGNOSIS — Z34.83 ENCOUNTER FOR SUPERVISION OF OTHER NORMAL PREGNANCY, THIRD TRIMESTER: Primary | ICD-10-CM

## 2025-02-12 ENCOUNTER — PRENATAL OFFICE VISIT (OUTPATIENT)
Dept: OBGYN | Facility: CLINIC | Age: 30
End: 2025-02-12
Payer: COMMERCIAL

## 2025-02-12 VITALS
BODY MASS INDEX: 25.14 KG/M2 | OXYGEN SATURATION: 99 % | HEART RATE: 70 BPM | SYSTOLIC BLOOD PRESSURE: 113 MMHG | WEIGHT: 151.1 LBS | DIASTOLIC BLOOD PRESSURE: 62 MMHG

## 2025-02-12 DIAGNOSIS — Z34.03 ENCOUNTER FOR SUPERVISION OF NORMAL FIRST PREGNANCY IN THIRD TRIMESTER: Primary | ICD-10-CM

## 2025-02-12 PROCEDURE — 99207 PR PRENATAL VISIT: CPT | Performed by: OBSTETRICS & GYNECOLOGY

## 2025-02-12 NOTE — PROGRESS NOTES
33w6d  Active fetal movement. No contractions, no leaking, no bleeding, no concerns.   Interested in colostrum collection kit at next appt  BSUS: cephalic.  RTC 2w, sooner PRN.   Aarti Colon MD

## 2025-02-27 ENCOUNTER — PRENATAL OFFICE VISIT (OUTPATIENT)
Dept: OBGYN | Facility: CLINIC | Age: 30
End: 2025-02-27
Payer: COMMERCIAL

## 2025-02-27 VITALS
OXYGEN SATURATION: 98 % | SYSTOLIC BLOOD PRESSURE: 109 MMHG | WEIGHT: 155 LBS | BODY MASS INDEX: 25.79 KG/M2 | DIASTOLIC BLOOD PRESSURE: 73 MMHG | HEART RATE: 89 BPM

## 2025-02-27 DIAGNOSIS — Z34.03 ENCOUNTER FOR SUPERVISION OF NORMAL FIRST PREGNANCY IN THIRD TRIMESTER: Primary | ICD-10-CM

## 2025-02-27 NOTE — PROGRESS NOTES
36w0d  Doing well.  Able to keep up with 12 hour shifts.  No contractions.  +FM  Cephalic by BSUS.  GBS today.  Please send to lab for CBC at next visit.  Put in referral for genetics recently due to mom's diagnosis of frontotemporal dementia on autopsy.  Has appointment with GC in neurology on 3/14.  Doesn't know sex of baby.  Would like circ if boy, aware it's not done in our hospital.  Taking baby to peds in [place].  Will check with their office to see if they do them if they have a boy.  RTC weekly until delivery.  Aarti Flowers MD

## 2025-03-03 ENCOUNTER — PRENATAL OFFICE VISIT (OUTPATIENT)
Dept: OBGYN | Facility: CLINIC | Age: 30
End: 2025-03-03
Payer: COMMERCIAL

## 2025-03-03 VITALS
HEART RATE: 102 BPM | HEIGHT: 65 IN | OXYGEN SATURATION: 99 % | DIASTOLIC BLOOD PRESSURE: 71 MMHG | SYSTOLIC BLOOD PRESSURE: 103 MMHG | WEIGHT: 158.9 LBS | BODY MASS INDEX: 26.48 KG/M2

## 2025-03-03 DIAGNOSIS — Z34.03 ENCOUNTER FOR SUPERVISION OF NORMAL FIRST PREGNANCY IN THIRD TRIMESTER: Primary | ICD-10-CM

## 2025-03-03 LAB
ERYTHROCYTE [DISTWIDTH] IN BLOOD BY AUTOMATED COUNT: 13 % (ref 10–15)
HCT VFR BLD AUTO: 38.9 % (ref 35–47)
HGB BLD-MCNC: 13.1 G/DL (ref 11.7–15.7)
HOLD SPECIMEN: NORMAL
MCH RBC QN AUTO: 32.3 PG (ref 26.5–33)
MCHC RBC AUTO-ENTMCNC: 33.7 G/DL (ref 31.5–36.5)
MCV RBC AUTO: 96 FL (ref 78–100)
PLATELET # BLD AUTO: 220 10E3/UL (ref 150–450)
RBC # BLD AUTO: 4.06 10E6/UL (ref 3.8–5.2)
WBC # BLD AUTO: 8.9 10E3/UL (ref 4–11)

## 2025-03-03 PROCEDURE — 85027 COMPLETE CBC AUTOMATED: CPT | Performed by: OBSTETRICS & GYNECOLOGY

## 2025-03-03 PROCEDURE — 0502F SUBSEQUENT PRENATAL CARE: CPT | Performed by: OBSTETRICS & GYNECOLOGY

## 2025-03-03 PROCEDURE — 36415 COLL VENOUS BLD VENIPUNCTURE: CPT | Performed by: OBSTETRICS & GYNECOLOGY

## 2025-03-03 PROCEDURE — 99207 PR PRENATAL VISIT: CPT | Performed by: OBSTETRICS & GYNECOLOGY

## 2025-03-03 PROCEDURE — 3078F DIAST BP <80 MM HG: CPT | Performed by: OBSTETRICS & GYNECOLOGY

## 2025-03-03 PROCEDURE — 3074F SYST BP LT 130 MM HG: CPT | Performed by: OBSTETRICS & GYNECOLOGY

## 2025-03-03 ASSESSMENT — PATIENT HEALTH QUESTIONNAIRE - PHQ9
SUM OF ALL RESPONSES TO PHQ QUESTIONS 1-9: 3
10. IF YOU CHECKED OFF ANY PROBLEMS, HOW DIFFICULT HAVE THESE PROBLEMS MADE IT FOR YOU TO DO YOUR WORK, TAKE CARE OF THINGS AT HOME, OR GET ALONG WITH OTHER PEOPLE: NOT DIFFICULT AT ALL
SUM OF ALL RESPONSES TO PHQ QUESTIONS 1-9: 3

## 2025-03-03 NOTE — PROGRESS NOTES
Patient states she is overall feeling ok.  Energy is ok.  She does have some sleep disruption for which she uses unisom as needed.  She denies any contractions, vaginal bleeding, leaking fluid, headache, changes in vision, chest pain, chest pressure, shortness of breath or edema.  Normal fetal movement.  Discussed pain control options in labor.  Planning epidural.  GBS negative, cephalic by BSUS  CBC today  Has pp OTC meds.   Reviewed s/sx of labor, ROM, and discussed return precautions.  Next visit 3/13/25    Nani Christopher MD

## 2025-03-13 ENCOUNTER — PRENATAL OFFICE VISIT (OUTPATIENT)
Dept: OBGYN | Facility: CLINIC | Age: 30
End: 2025-03-13
Payer: COMMERCIAL

## 2025-03-13 VITALS
TEMPERATURE: 97.9 F | OXYGEN SATURATION: 99 % | HEIGHT: 65 IN | BODY MASS INDEX: 26.36 KG/M2 | DIASTOLIC BLOOD PRESSURE: 64 MMHG | SYSTOLIC BLOOD PRESSURE: 99 MMHG | WEIGHT: 158.2 LBS | HEART RATE: 70 BPM

## 2025-03-13 DIAGNOSIS — Z34.03 ENCOUNTER FOR SUPERVISION OF NORMAL FIRST PREGNANCY IN THIRD TRIMESTER: Primary | ICD-10-CM

## 2025-03-13 NOTE — PATIENT INSTRUCTIONS
Week 38 of Your Pregnancy: Care Instructions  Believe it or not, your baby is almost here. You may notice how your baby responds to sounds, warmth, cold, and light. You may even know what kind of music your baby likes.    Even if you expect a vaginal birth, it's a good idea to learn about  section ().  is the delivery of a baby through a cut (incision) in your belly. It's a major surgery, so it has more risks than a vaginal birth.   During the first 2 weeks after the birth, limit visitors. Don't allow visitors who have colds or infections. Ask visitors to wash their hands before they touch your baby. And never let anyone smoke around your baby.     Know about unplanned C-sections.  Reasons for an unplanned  include labor that slows or stops, signs of distress in your baby, and high blood pressure or other problems for you.     Know about planned C-sections.  If your baby isn't in a head-down position for delivery (breech position), your doctor may plan a . Or you may have a planned  if you're having twins or more.     Get as much help as you can while you're in the hospital.  You can learn about feeding, diapering, and bathing your baby.     Talk to your doctor or midwife about how to care for the umbilical cord stump.  If your baby will be circumcised, also ask about how to care for that.     Ask friends or family for help, as you need it.  If you can, have another adult in your home for at least 2 or 3 days after the birth.     Try to nap when your baby naps.  This may be your best chance to get some sleep.     Watch for changes in your mental health.  For the first 1 to 2 weeks after birth, it's common to cry or feel sad or irritable. If these feelings last for more than 2 weeks, you may have postpartum depression. Ask your doctor for help. Postpartum depression can be treated.   Follow-up care is a key part of your treatment and safety. Be sure to make and go  "to all appointments, and call your doctor if you are having problems. It's also a good idea to know your test results and keep a list of the medicines you take.  Where can you learn more?  Go to https://www.[employer].net/patiented  Enter B044 in the search box to learn more about \"Week 38 of Your Pregnancy: Care Instructions.\"  Current as of: April 30, 2024  Content Version: 14.3    2024 [employer]   Care instructions adapted under license by your healthcare professional. If you have questions about a medical condition or this instruction, always ask your healthcare professional. [employer] disclaims any warranty or liability for your use of this information.    "

## 2025-03-14 NOTE — PROGRESS NOTES
"Return OB visit    Subjective:  Patient reports active fetal movement, no vaginal bleeding or leaking fluid. She denies regular contractions. She has no concerns today.       Objective:  BP 99/64   Pulse 70   Temp 97.9  F (36.6  C)   Ht 1.651 m (5' 5\")   Wt 71.8 kg (158 lb 3.2 oz)   LMP 2024   SpO2 99%   BMI 26.33 kg/m     See OB flow sheet    Assessment and Plan    Melody Cash is a 29 year old  at 38w0d here for RAISSA visit, pregnancy uncomplicated     This visit:  -Routine PNC, discussed labor precautions    Next visit:  -Routine PNC     RTC in 1 weeks or sooner PRN    Alexandrea Sharma MD    "

## 2025-03-20 ENCOUNTER — PRENATAL OFFICE VISIT (OUTPATIENT)
Dept: OBGYN | Facility: CLINIC | Age: 30
End: 2025-03-20
Payer: COMMERCIAL

## 2025-03-20 VITALS
BODY MASS INDEX: 26.24 KG/M2 | SYSTOLIC BLOOD PRESSURE: 106 MMHG | WEIGHT: 157.7 LBS | DIASTOLIC BLOOD PRESSURE: 64 MMHG | OXYGEN SATURATION: 100 % | HEART RATE: 72 BPM

## 2025-03-20 DIAGNOSIS — Z34.03 ENCOUNTER FOR SUPERVISION OF NORMAL FIRST PREGNANCY IN THIRD TRIMESTER: Primary | ICD-10-CM

## 2025-03-20 NOTE — PROGRESS NOTES
39 wks here for RAISSA  +FM  Denies ctx, LOF, vag bleeding or pre-e  Has noted some menstrual like cramps, and irregular tightening  Overall feeling well  Has one shift planned before leave but is otherwise off the next 9 days and hoping baby makes their appearance  Has apt with Dr. Flowers just before due date- discussed if she can do bedside KENYETTA check otherwise I will order/schedule formal bpp/kenyetta after her due date- She and Dr. Flowers will likely discuss/schedule IOL if she makes it to that visit, discussed in general not recc to go past 42 weeks due to decline in placental fxn  Requests membrane sweep, cervix medium, posterior, 40% ft, baby -4 station   Rtc 1 wk  RN triage for fkc, srom, labor, vaginal bleeding, pre-e  PHIL Phan CNP

## 2025-03-25 ENCOUNTER — PRENATAL OFFICE VISIT (OUTPATIENT)
Dept: OBGYN | Facility: CLINIC | Age: 30
End: 2025-03-25
Payer: COMMERCIAL

## 2025-03-25 VITALS
SYSTOLIC BLOOD PRESSURE: 111 MMHG | BODY MASS INDEX: 26.46 KG/M2 | OXYGEN SATURATION: 100 % | HEART RATE: 80 BPM | DIASTOLIC BLOOD PRESSURE: 78 MMHG | WEIGHT: 159 LBS

## 2025-03-25 DIAGNOSIS — Z34.03 ENCOUNTER FOR SUPERVISION OF NORMAL FIRST PREGNANCY IN THIRD TRIMESTER: Primary | ICD-10-CM

## 2025-03-25 PROCEDURE — 99207 PR PRENATAL VISIT: CPT | Performed by: OBSTETRICS & GYNECOLOGY

## 2025-03-25 PROCEDURE — 0502F SUBSEQUENT PRENATAL CARE: CPT | Performed by: OBSTETRICS & GYNECOLOGY

## 2025-03-25 PROCEDURE — 3074F SYST BP LT 130 MM HG: CPT | Performed by: OBSTETRICS & GYNECOLOGY

## 2025-03-25 PROCEDURE — 3078F DIAST BP <80 MM HG: CPT | Performed by: OBSTETRICS & GYNECOLOGY

## 2025-03-25 NOTE — PROGRESS NOTES
39w5d  Doing well and hoping for labor soon.  Did have some cramping at work, but nothing significant.  No vaginal bleeding or leakage of fluid.  Normal FM.  Discussed option of IOL 39-41w and for now she'd like to wait.  RTC 1w.  Aarti Flowers MD

## 2025-03-26 ENCOUNTER — TELEPHONE (OUTPATIENT)
Dept: OBGYN | Facility: CLINIC | Age: 30
End: 2025-03-26

## 2025-03-26 NOTE — TELEPHONE ENCOUNTER
RN called pt back after she called to schedule an induction.     Pt scheduled for April 3rd at 730 am     Routing to provider as NAVID Christopher MD on call that day     Velvet Batista RN on 3/26/2025 at 12:31 PM

## 2025-04-01 ENCOUNTER — PRENATAL OFFICE VISIT (OUTPATIENT)
Dept: OBGYN | Facility: CLINIC | Age: 30
End: 2025-04-01
Attending: OBSTETRICS & GYNECOLOGY
Payer: COMMERCIAL

## 2025-04-01 ENCOUNTER — ANCILLARY PROCEDURE (OUTPATIENT)
Dept: ULTRASOUND IMAGING | Facility: CLINIC | Age: 30
End: 2025-04-01
Attending: OBSTETRICS & GYNECOLOGY
Payer: COMMERCIAL

## 2025-04-01 VITALS
BODY MASS INDEX: 26.71 KG/M2 | DIASTOLIC BLOOD PRESSURE: 69 MMHG | WEIGHT: 160.5 LBS | HEART RATE: 65 BPM | SYSTOLIC BLOOD PRESSURE: 101 MMHG | OXYGEN SATURATION: 100 %

## 2025-04-01 DIAGNOSIS — Z34.03 ENCOUNTER FOR SUPERVISION OF NORMAL FIRST PREGNANCY IN THIRD TRIMESTER: Primary | ICD-10-CM

## 2025-04-01 DIAGNOSIS — Z34.03 ENCOUNTER FOR SUPERVISION OF NORMAL FIRST PREGNANCY IN THIRD TRIMESTER: ICD-10-CM

## 2025-04-01 PROCEDURE — 3074F SYST BP LT 130 MM HG: CPT | Performed by: OBSTETRICS & GYNECOLOGY

## 2025-04-01 PROCEDURE — 76819 FETAL BIOPHYS PROFIL W/O NST: CPT | Performed by: OBSTETRICS & GYNECOLOGY

## 2025-04-01 PROCEDURE — 0502F SUBSEQUENT PRENATAL CARE: CPT | Performed by: OBSTETRICS & GYNECOLOGY

## 2025-04-01 PROCEDURE — 3078F DIAST BP <80 MM HG: CPT | Performed by: OBSTETRICS & GYNECOLOGY

## 2025-04-01 PROCEDURE — 99207 PR PRENATAL VISIT: CPT | Performed by: OBSTETRICS & GYNECOLOGY

## 2025-04-03 ENCOUNTER — ANESTHESIA (OUTPATIENT)
Dept: OBGYN | Facility: CLINIC | Age: 30
End: 2025-04-03
Payer: COMMERCIAL

## 2025-04-03 ENCOUNTER — ANESTHESIA EVENT (OUTPATIENT)
Dept: OBGYN | Facility: CLINIC | Age: 30
End: 2025-04-03
Payer: COMMERCIAL

## 2025-04-03 ENCOUNTER — HOSPITAL ENCOUNTER (INPATIENT)
Facility: CLINIC | Age: 30
LOS: 3 days | Discharge: HOME | End: 2025-04-06
Attending: OBSTETRICS & GYNECOLOGY | Admitting: OBSTETRICS & GYNECOLOGY
Payer: COMMERCIAL

## 2025-04-03 PROBLEM — Z34.90 PREGNANCY: Status: ACTIVE | Noted: 2025-04-03

## 2025-04-03 LAB
ABO + RH BLD: NORMAL
BLD GP AB SCN SERPL QL: NEGATIVE
ERYTHROCYTE [DISTWIDTH] IN BLOOD BY AUTOMATED COUNT: 13.5 % (ref 10–15)
HCT VFR BLD AUTO: 41.2 % (ref 35–47)
HGB BLD-MCNC: 13.8 G/DL (ref 11.7–15.7)
MCH RBC QN AUTO: 31.9 PG (ref 26.5–33)
MCHC RBC AUTO-ENTMCNC: 33.5 G/DL (ref 31.5–36.5)
MCV RBC AUTO: 95 FL (ref 78–100)
PLATELET # BLD AUTO: 234 10E3/UL (ref 150–450)
RBC # BLD AUTO: 4.32 10E6/UL (ref 3.8–5.2)
SPECIMEN EXP DATE BLD: NORMAL
T PALLIDUM AB SER QL: NONREACTIVE
WBC # BLD AUTO: 11 10E3/UL (ref 4–11)

## 2025-04-03 PROCEDURE — 86850 RBC ANTIBODY SCREEN: CPT | Performed by: OBSTETRICS & GYNECOLOGY

## 2025-04-03 PROCEDURE — 250N000013 HC RX MED GY IP 250 OP 250 PS 637: Performed by: OBSTETRICS & GYNECOLOGY

## 2025-04-03 PROCEDURE — 85027 COMPLETE CBC AUTOMATED: CPT | Performed by: OBSTETRICS & GYNECOLOGY

## 2025-04-03 PROCEDURE — 999N000127 HC STATISTIC PERIPHERAL IV START W US GUIDANCE

## 2025-04-03 PROCEDURE — 999N000285 HC STATISTIC VASC ACCESS LAB DRAW WITH PIV START

## 2025-04-03 PROCEDURE — 258N000003 HC RX IP 258 OP 636

## 2025-04-03 PROCEDURE — 120N000002 HC R&B MED SURG/OB UMMC

## 2025-04-03 PROCEDURE — 86780 TREPONEMA PALLIDUM: CPT | Performed by: OBSTETRICS & GYNECOLOGY

## 2025-04-03 PROCEDURE — 250N000011 HC RX IP 250 OP 636: Performed by: STUDENT IN AN ORGANIZED HEALTH CARE EDUCATION/TRAINING PROGRAM

## 2025-04-03 PROCEDURE — 0U7C7DJ DILATION OF CERVIX WITH INTRALUMINAL DEVICE, TEMPORARY, VIA NATURAL OR ARTIFICIAL OPENING: ICD-10-PCS | Performed by: OBSTETRICS & GYNECOLOGY

## 2025-04-03 PROCEDURE — 3E0R3BZ INTRODUCTION OF ANESTHETIC AGENT INTO SPINAL CANAL, PERCUTANEOUS APPROACH: ICD-10-PCS | Performed by: STUDENT IN AN ORGANIZED HEALTH CARE EDUCATION/TRAINING PROGRAM

## 2025-04-03 PROCEDURE — 00HU33Z INSERTION OF INFUSION DEVICE INTO SPINAL CANAL, PERCUTANEOUS APPROACH: ICD-10-PCS | Performed by: STUDENT IN AN ORGANIZED HEALTH CARE EDUCATION/TRAINING PROGRAM

## 2025-04-03 PROCEDURE — 86900 BLOOD TYPING SEROLOGIC ABO: CPT | Performed by: OBSTETRICS & GYNECOLOGY

## 2025-04-03 PROCEDURE — 250N000013 HC RX MED GY IP 250 OP 250 PS 637

## 2025-04-03 PROCEDURE — 370N000003 HC ANESTHESIA WARD SERVICE: Performed by: STUDENT IN AN ORGANIZED HEALTH CARE EDUCATION/TRAINING PROGRAM

## 2025-04-03 PROCEDURE — 10907ZC DRAINAGE OF AMNIOTIC FLUID, THERAPEUTIC FROM PRODUCTS OF CONCEPTION, VIA NATURAL OR ARTIFICIAL OPENING: ICD-10-PCS | Performed by: OBSTETRICS & GYNECOLOGY

## 2025-04-03 RX ORDER — METOCLOPRAMIDE 10 MG/1
10 TABLET ORAL EVERY 6 HOURS PRN
Status: DISCONTINUED | OUTPATIENT
Start: 2025-04-03 | End: 2025-04-04 | Stop reason: HOSPADM

## 2025-04-03 RX ORDER — TRANEXAMIC ACID 10 MG/ML
1 INJECTION, SOLUTION INTRAVENOUS EVERY 30 MIN PRN
Status: DISCONTINUED | OUTPATIENT
Start: 2025-04-03 | End: 2025-04-04 | Stop reason: HOSPADM

## 2025-04-03 RX ORDER — OXYTOCIN/0.9 % SODIUM CHLORIDE 30/500 ML
100-340 PLASTIC BAG, INJECTION (ML) INTRAVENOUS CONTINUOUS PRN
Status: DISCONTINUED | OUTPATIENT
Start: 2025-04-03 | End: 2025-04-04

## 2025-04-03 RX ORDER — KETOROLAC TROMETHAMINE 15 MG/ML
15 INJECTION, SOLUTION INTRAMUSCULAR; INTRAVENOUS
Status: COMPLETED | OUTPATIENT
Start: 2025-04-03 | End: 2025-04-04

## 2025-04-03 RX ORDER — MISOPROSTOL 100 UG/1
25 TABLET ORAL EVERY 4 HOURS PRN
Status: DISCONTINUED | OUTPATIENT
Start: 2025-04-03 | End: 2025-04-04 | Stop reason: HOSPADM

## 2025-04-03 RX ORDER — NALOXONE HYDROCHLORIDE 0.4 MG/ML
0.4 INJECTION, SOLUTION INTRAMUSCULAR; INTRAVENOUS; SUBCUTANEOUS
Status: DISCONTINUED | OUTPATIENT
Start: 2025-04-03 | End: 2025-04-04

## 2025-04-03 RX ORDER — FENTANYL/ROPIVACAINE/NS/PF 2MCG/ML-.1
PLASTIC BAG, INJECTION (ML) EPIDURAL
Status: DISCONTINUED | OUTPATIENT
Start: 2025-04-03 | End: 2025-04-04

## 2025-04-03 RX ORDER — MISOPROSTOL 200 UG/1
800 TABLET ORAL
Status: DISCONTINUED | OUTPATIENT
Start: 2025-04-03 | End: 2025-04-04 | Stop reason: HOSPADM

## 2025-04-03 RX ORDER — ONDANSETRON 4 MG/1
4 TABLET, ORALLY DISINTEGRATING ORAL EVERY 6 HOURS PRN
Status: DISCONTINUED | OUTPATIENT
Start: 2025-04-03 | End: 2025-04-03

## 2025-04-03 RX ORDER — OXYTOCIN 10 [USP'U]/ML
10 INJECTION, SOLUTION INTRAMUSCULAR; INTRAVENOUS
Status: DISCONTINUED | OUTPATIENT
Start: 2025-04-03 | End: 2025-04-04

## 2025-04-03 RX ORDER — NALOXONE HYDROCHLORIDE 0.4 MG/ML
0.2 INJECTION, SOLUTION INTRAMUSCULAR; INTRAVENOUS; SUBCUTANEOUS
Status: DISCONTINUED | OUTPATIENT
Start: 2025-04-03 | End: 2025-04-04

## 2025-04-03 RX ORDER — OXYTOCIN/0.9 % SODIUM CHLORIDE 30/500 ML
340 PLASTIC BAG, INJECTION (ML) INTRAVENOUS CONTINUOUS PRN
Status: DISCONTINUED | OUTPATIENT
Start: 2025-04-03 | End: 2025-04-04 | Stop reason: HOSPADM

## 2025-04-03 RX ORDER — PROCHLORPERAZINE MALEATE 10 MG
10 TABLET ORAL EVERY 6 HOURS PRN
Status: DISCONTINUED | OUTPATIENT
Start: 2025-04-03 | End: 2025-04-04 | Stop reason: HOSPADM

## 2025-04-03 RX ORDER — MISOPROSTOL 100 UG/1
25 TABLET ORAL
Status: DISCONTINUED | OUTPATIENT
Start: 2025-04-03 | End: 2025-04-04 | Stop reason: HOSPADM

## 2025-04-03 RX ORDER — ONDANSETRON 2 MG/ML
4 INJECTION INTRAMUSCULAR; INTRAVENOUS EVERY 6 HOURS PRN
Status: DISCONTINUED | OUTPATIENT
Start: 2025-04-03 | End: 2025-04-04 | Stop reason: HOSPADM

## 2025-04-03 RX ORDER — IBUPROFEN 800 MG/1
800 TABLET, FILM COATED ORAL
Status: COMPLETED | OUTPATIENT
Start: 2025-04-03 | End: 2025-04-04

## 2025-04-03 RX ORDER — LOPERAMIDE HYDROCHLORIDE 2 MG/1
2 CAPSULE ORAL
Status: DISCONTINUED | OUTPATIENT
Start: 2025-04-03 | End: 2025-04-04 | Stop reason: HOSPADM

## 2025-04-03 RX ORDER — CITRIC ACID/SODIUM CITRATE 334-500MG
30 SOLUTION, ORAL ORAL
Status: DISCONTINUED | OUTPATIENT
Start: 2025-04-03 | End: 2025-04-04 | Stop reason: HOSPADM

## 2025-04-03 RX ORDER — OXYTOCIN 10 [USP'U]/ML
10 INJECTION, SOLUTION INTRAMUSCULAR; INTRAVENOUS
Status: DISCONTINUED | OUTPATIENT
Start: 2025-04-03 | End: 2025-04-04 | Stop reason: HOSPADM

## 2025-04-03 RX ORDER — FENTANYL CITRATE-0.9 % NACL/PF 10 MCG/ML
100 PLASTIC BAG, INJECTION (ML) INTRAVENOUS EVERY 5 MIN PRN
Status: DISCONTINUED | OUTPATIENT
Start: 2025-04-03 | End: 2025-04-04 | Stop reason: HOSPADM

## 2025-04-03 RX ORDER — LIDOCAINE 40 MG/G
CREAM TOPICAL
Status: DISCONTINUED | OUTPATIENT
Start: 2025-04-03 | End: 2025-04-04

## 2025-04-03 RX ORDER — METHYLERGONOVINE MALEATE 0.2 MG/ML
200 INJECTION INTRAVENOUS
Status: DISCONTINUED | OUTPATIENT
Start: 2025-04-03 | End: 2025-04-04 | Stop reason: HOSPADM

## 2025-04-03 RX ORDER — ONDANSETRON 4 MG/1
4 TABLET, ORALLY DISINTEGRATING ORAL EVERY 6 HOURS PRN
Status: DISCONTINUED | OUTPATIENT
Start: 2025-04-03 | End: 2025-04-04 | Stop reason: HOSPADM

## 2025-04-03 RX ORDER — METOCLOPRAMIDE HYDROCHLORIDE 5 MG/ML
10 INJECTION INTRAMUSCULAR; INTRAVENOUS EVERY 6 HOURS PRN
Status: DISCONTINUED | OUTPATIENT
Start: 2025-04-03 | End: 2025-04-04 | Stop reason: HOSPADM

## 2025-04-03 RX ORDER — CITRIC ACID/SODIUM CITRATE 334-500MG
30 SOLUTION, ORAL ORAL ONCE
Status: DISCONTINUED | OUTPATIENT
Start: 2025-04-03 | End: 2025-04-04 | Stop reason: HOSPADM

## 2025-04-03 RX ORDER — LOPERAMIDE HYDROCHLORIDE 2 MG/1
4 CAPSULE ORAL
Status: DISCONTINUED | OUTPATIENT
Start: 2025-04-03 | End: 2025-04-04 | Stop reason: HOSPADM

## 2025-04-03 RX ORDER — ONDANSETRON 2 MG/ML
4 INJECTION INTRAMUSCULAR; INTRAVENOUS EVERY 6 HOURS PRN
Status: DISCONTINUED | OUTPATIENT
Start: 2025-04-03 | End: 2025-04-03

## 2025-04-03 RX ORDER — MISOPROSTOL 200 UG/1
400 TABLET ORAL
Status: DISCONTINUED | OUTPATIENT
Start: 2025-04-03 | End: 2025-04-04 | Stop reason: HOSPADM

## 2025-04-03 RX ORDER — CARBOPROST TROMETHAMINE 250 UG/ML
250 INJECTION, SOLUTION INTRAMUSCULAR
Status: DISCONTINUED | OUTPATIENT
Start: 2025-04-03 | End: 2025-04-04 | Stop reason: HOSPADM

## 2025-04-03 RX ADMIN — METOCLOPRAMIDE 10 MG: 10 TABLET ORAL at 22:15

## 2025-04-03 RX ADMIN — MISOPROSTOL 25 MCG: 100 TABLET ORAL at 16:08

## 2025-04-03 RX ADMIN — MISOPROSTOL 25 MCG: 100 TABLET ORAL at 18:07

## 2025-04-03 RX ADMIN — MISOPROSTOL 25 MCG: 100 TABLET ORAL at 14:06

## 2025-04-03 RX ADMIN — MISOPROSTOL 25 MCG: 100 TABLET ORAL at 12:01

## 2025-04-03 RX ADMIN — SODIUM CHLORIDE, SODIUM LACTATE, POTASSIUM CHLORIDE, AND CALCIUM CHLORIDE 500 ML: .6; .31; .03; .02 INJECTION, SOLUTION INTRAVENOUS at 22:28

## 2025-04-03 RX ADMIN — ONDANSETRON 4 MG: 2 INJECTION INTRAMUSCULAR; INTRAVENOUS at 23:15

## 2025-04-03 ASSESSMENT — ACTIVITIES OF DAILY LIVING (ADL)
ADLS_ACUITY_SCORE: 18
ADLS_ACUITY_SCORE: 41
ADLS_ACUITY_SCORE: 18

## 2025-04-03 NOTE — PLAN OF CARE
Data: [facility] presented to [facility]: 4/3/2025  7:32 AM.  Reason for maternal/fetal assessment is  IOL postdates . Patient denies uterine contractions, leaking of vaginal fluid/rupture of membranes, vaginal bleeding, abdominal pain, pelvic pressure, nausea, vomiting, headache, visual disturbances, epigastric or RUQ pain, significant edema. Patient reports fetal movement is normal. Patient is a 41w0d .  Prenatal record reviewed. Pregnancy has been uncomplicated.    Vital signs wnl.  Gary, is present.     Action: Verbal consent for EFM. Triage assessment completed. She brought EBM storing in Milk fridge on L/D, has a birthplan, will make a copy for chart when available. Reviewed IOL process.    Response: Patient verbalized agreement with plan. Will contact Dr Maria G Cisneros and Dr Nani Christopher with update and further orders.

## 2025-04-03 NOTE — PROGRESS NOTES
Labor Progress Note    S: patient continues to feel mild cramps, no change since starting misoprostol. Continues to leak fluid.     O: Visit Vitals  /74 (BP Location: Left arm, Patient Position: Semi-Castaneda's, Cuff Size: Adult Regular)   Temp 98.4  F (36.9  C) (Oral)   Resp 18     Gen: Standing in room, comfortable  FHT: 155, moderate variability, prolonged accels, no decels  [place]: 4 in 10     A/P: 30 yo  admitted for late term induction of labor. S/p cook catheter, removed following SROM w/ MSF at 1030, s/p PO miso x 2. Plan to repeat SVE after 3rd dose of miso. FWB category I, reactive. Anticipate .     Maria G Cisneros MD  Obstetrics and Gynecology, PGY-2  2025 4:03 PM

## 2025-04-03 NOTE — H&P
OB HISTORY AND PHYSICAL    Patient: Melody Cash   MRN#: [id number]  YOB: 1995     HPI: Melody Cash is a 29 year old  at 41w0d by 8w0d with history of pseudocholinesterase deficiency who presents today for scheduled IOL.    She reports good fetal movement. Denies LOF, vaginal bleeding. Occasional Rudi-Lopez contractions.      She denies headache, SOB, chest pain, palpitations, nausea, emesis, RUQ pain, epigastric pain, dysuria, change in vaginal discharge, diarrhea.     Pregnancy notable for:   Acetylcholinesterase deficiency     No history of asthma or high blood pressure.      OBGYN History:   -    - Last Pap: 2022   - Gyn surgeries: None    Prenatal Lab Results:  Lab Results   Component Value Date    HCT 38.9 2025    HGB 13.1 2025       Patient Active Problem List    Diagnosis Date Noted    Pregnancy 2025     Priority: Medium    Family history of melanoma 2017     Priority: Medium     Formatting of this note might be different from the original.  Paternal uncle      Celiac disease 2013     Priority: Medium     Formatting of this note might be different from the original.  Small bowel biopsy markedly positive. Tissue transglutaminase antibody positive; skin testing negative         HISTORY  No past medical history on file.    Past Surgical History:   Procedure Laterality Date    widsom teeth         Family History   Problem Relation Age of Onset    Dementia Mother     Anxiety Disorder Mother     Alzheimer Disease Mother     Frontotemporal dementia Mother     Prostate Cancer Father     Glaucoma Maternal Grandmother     Hypertension Maternal Grandfather     Hyperlipidemia Maternal Grandfather     Blood Disease Maternal Grandfather     Cancer Maternal Grandfather     Colon Cancer Maternal Grandfather     Cancer Paternal Grandmother     No Known Problems Paternal Grandfather     No Known Problems Brother     Melanoma Other     Coronary Artery Disease  "Other         PGFA    Diabetes Other         PGFA       Social History     Tobacco Use    Smoking status: Never     Passive exposure: Never    Smokeless tobacco: Never   Vaping Use    Vaping status: Never Used   Substance Use Topics    Alcohol use: Not Currently     Comment: socially    Drug use: No       Medications Prior to Admission   Medication Sig Dispense Refill Last Dose/Taking    doxylamine (UNISOM) 25 MG TABS tablet Take 12.5 mg by mouth at bedtime.   2025 Bedtime    Prenatal Vit-Fe Fumarate-FA (PNV PRENATAL PLUS MULTIVITAMIN) 27-1 MG TABS per tablet    2025 Evening       Allergies   Allergen Reactions    Gluten Meal GI Disturbance and Other (See Comments)    Chlorhexidine Itching    Tegaderm Transparent Dressing (Informational Only) Itching    Adhesive Tape Rash        REVIEW OF SYSTEMS:  A 10 point review of systems was completed and was negative other than as noted in the HPI.    PHYSICAL EXAM  Patient Vitals for the past 24 hrs:   BP Temp Temp src Resp Height Weight   25 0827 -- -- -- -- -- 72.9 kg (160 lb 12.8 oz)   25 0825 -- -- -- -- 1.651 m (5' 5\") --   25 0800 105/75 97.9  F (36.6  C) Oral 18 -- --     Gen: NAD  CV: warm well perfused   Lungs: non-labored breathing  Abd: Gravid  Ext: no peripheral extremity lm    SSE: NAD  Cervix: 30/-2  Membranes: intact  Presentation: Cephalic by BSUS.  Estimated Fetal Weight: 7# by leopolds     FHT:  Monitoring External  FHT: Baseline 145 bpm; mod variability;  accels present; no decelerations  TOCO 0 contractions in 10 minutes    Studies: T&S, CBC, RPR    Assessment & Plan: 29 year old  at 41w0d here for eIOL.        Induction of Labor  Cervix: Bishops score 3.  - Admit for IOL in the setting of late term gestation.   - Will plan to start with misoprostol and Cook catheter given above Perez's score; cook placed without difficulty, inflated with 70 ml; will await 30 minutes prior to placing PV miso   - Membranes: Intact   - " Augmentation: Pitocin, AROM prn  - Labs: CBC, T&S, RPR  - GBS negative; Antibiotics not indicated.  - Pain Control: Per patient request; Discussed options. Would like epidural. Would like anesthesia team to be aware of acetylcholinesterase deficiency, In event patient needs general anesthesia   - Diet: Regular  - PPH Meds: Standard Meds  - PPx: SCDs for prolonged periods of immobility      PNC  - Rh pos, Rubella NI, GCT nl, GBS negative   - Other prenatal labs wnl  - Imaging: placenta posterior  - S/p flu, Tdap vaccines  - Pap last 1/2022  - Contraception: Condoms  - Feeding: Breastfeeding     FWB:   Cat I tracing, reactive  - Continuous Fetal Monitoring  - Intrauterine resuscitative measures prn     PPH Risk:  - Medium (IOL, augment with pit, HTN, mag, triple III, prolonged labor, precipitous labor/delivery, h/o abruption, >5 deliveries, fibroids, large baby, poly, multiples, general anesthesia, shoulder dystocia, lacs/epis, op delivery, hematoma)- IV, type and screen      Patient discussed with Dr. Ashwin Cisneros MD   OBGYN Resident, PGY-2        Physician Attestation   I personally examined and evaluated this patient.  I discussed the patient with the resident/fellow and care team, and agree with the assessment and plan of care as documented in the note.     Key findings: Reviewed with patient plan for induction starting with cervical exam, cook balloon for cervical ripening if able to place based on exam.  This was able to be placed and I was present for entire placement.  Discussed waiting 30 minutes to see contraction pattern prior to dose of misoprostol.  When balloon falls out would then proceed with pitocin and AROM if SROM does not occur.  Patient plans epidural for pain control.    Nani Christopher MD  Date of Service (when I saw the patient): 04/03/25 04/03/25 10:33 AM

## 2025-04-03 NOTE — ANESTHESIA PREPROCEDURE EVALUATION
Anesthesia Pre-Procedure Evaluation    Patient: Melody Cash   MRN: [id number] : 1995        Procedure :           No past medical history on file.   Past Surgical History:   Procedure Laterality Date    widsom teeth        Allergies   Allergen Reactions    Gluten Meal GI Disturbance and Other (See Comments)    Chlorhexidine Itching    Tegaderm Transparent Dressing (Informational Only) Itching    Adhesive Tape Rash      Social History     Tobacco Use    Smoking status: Never     Passive exposure: Never    Smokeless tobacco: Never   Substance Use Topics    Alcohol use: Not Currently     Comment: socially      Wt Readings from Last 1 Encounters:   25 72.9 kg (160 lb 12.8 oz)        Anesthesia Evaluation   Pt has had prior anesthetic.     No history of anesthetic complications       ROS/MED HX  ENT/Pulmonary:  - neg pulmonary ROS     Neurologic:  - neg neurologic ROS     Cardiovascular:  - neg cardiovascular ROS   (+)  - -   -  - -                                 Previous cardiac testing   Echo: Date:  Results:  Global and regional left ventricular function is normal with an EF of 60-65%.  Right ventricular function, chamber size, wall motion, and thickness are  normal.  No significant valvular abnormalities present.  The inferior vena cava is normal.  No pericardial effusion is present.  There is no prior study for direct comparison.    Stress Test:  Date: Results:    ECG Reviewed:  Date: Results:    Cath:  Date: Results:      METS/Exercise Tolerance:     Hematologic:  - neg hematologic  ROS     Musculoskeletal:  - neg musculoskeletal ROS     GI/Hepatic: Comment: Celiac disease       Renal/Genitourinary:  - neg Renal ROS     Endo:  - neg endo ROS     Psychiatric/Substance Use:  - neg psychiatric ROS     Infectious Disease:  - neg infectious disease ROS     Malignancy:       Other: Comment: Carrier of pseudocholinesterase deficiency (note 2024)    41w0d  Here for IOL   (+) Possibly pregnant,  ", ,         Physical Exam    Airway        Mallampati: II   TM distance: > 3 FB   Neck ROM: full   Mouth opening: > 3 cm    Respiratory Devices and Support         Dental       (+) Completely normal teeth      Cardiovascular   cardiovascular exam normal          Pulmonary   pulmonary exam normal    Comment: Non-labored breathing            OUTSIDE LABS:  CBC:   Lab Results   Component Value Date    WBC 11.0 04/03/2025    WBC 8.9 03/03/2025    HGB 13.8 04/03/2025    HGB 13.1 03/03/2025    HCT 41.2 04/03/2025    HCT 38.9 03/03/2025     04/03/2025     03/03/2025     BMP:   Lab Results   Component Value Date     05/04/2023    POTASSIUM 4.3 05/04/2023    CHLORIDE 105 05/04/2023    CO2 25 05/04/2023    BUN 10.7 05/04/2023    CR 0.69 05/04/2023    GLC 80 05/04/2023     COAGS: No results found for: \"PTT\", \"INR\", \"FIBR\"  POC: No results found for: \"BGM\", \"HCG\", \"HCGS\"  HEPATIC:   Lab Results   Component Value Date    ALBUMIN 4.5 05/04/2023    PROTTOTAL 6.6 05/04/2023    ALT <5 (L) 05/04/2023    AST 16 05/04/2023    ALKPHOS 51 05/04/2023    BILITOTAL 0.6 05/04/2023     OTHER:   Lab Results   Component Value Date    LIN 9.5 05/04/2023    TSH 0.85 05/04/2023       Anesthesia Plan    ASA Status:  2    NPO Status:  ELEVATED Aspiration Risk/Unknown    Anesthesia Type: Epidural.   Induction: N/a.   Maintenance: N/A.        Consents    Anesthesia Plan(s) and associated risks, benefits, and realistic alternatives discussed. Questions answered and patient/representative(s) expressed understanding.     - Discussed: Risks, Benefits and Alternatives for BOTH SEDATION and the PROCEDURE were discussed     - Discussed with:  Patient, Spouse      - Extended Intubation/Ventilatory Support Discussed: No.      - Patient is DNR/DNI Status: No     Use of blood products discussed: Yes.     - Discussed with: Patient.     - Consented: consented to blood products     Postoperative Care    Pain management: Oral pain medications.    "     Comments:    Other Comments: 28 yo  at 41w1d presenting for elective IOL requesting labor analgesia. Carrier of pseudocholinesterase deficiency. Plan epidural with standard ASA monitors. We discussed the risks and benefits of neuraxial analgesia and/or anesthesia including, but not limited to: spinal headache, infection, bleeding, damage to surrounding structures, failed or patchy epidural requiring replacement or conversion to general anesthesia in an emergent setting. Melody Cash verbalized understanding of these risks. All questions were addressed.               Ryan Schwartz MD    Clinically Significant Risk Factors Present on Admission

## 2025-04-03 NOTE — CONSULTS
"Consult received for Vascular access care.  See LDA for details. For additional needs place \"Nursing to Consult for Vascular Access\" [id number] order in EPIC.  "

## 2025-04-03 NOTE — PROGRESS NOTES
Labor Progress Note    S: Feeling fairly comfortable. Leaking fluid.    O: Visit Vitals  /74 (BP Location: Right arm, Patient Position: Semi-Castaneda's, Cuff Size: Adult Regular)   Temp 97.9  F (36.6  C) (Oral)   Resp 18     Gen: Standing in room, comfortable  FHT: 140, moderate variability, prolonged accels, no decels  [place]yl[place]: irregular pattern, about 3-4 contractions in 10 minutes    A/P: 30 yo  admitted for late term induction of labor. S/p cook catheter, removed following SROM w/ MSF at 1030, making cervical change. Now giving PO miso #4, will continue with oral miso. FWB category I, reactive. Anticipate .     Vaibhav Kearney MD  OB/GYN PGY-3  2025 6:07 PM

## 2025-04-03 NOTE — PROGRESS NOTES
"Intrapartum Progress Note    S: Patient reports she is feeling a little bit of on and off cramping.  Continues to leak fluid.    O: .Patient Vitals for the past 4 hrs:   BP Temp Temp src Resp Height Weight   25 1145 121/74 98  F (36.7  C) Oral 18 -- --   25 0827 -- -- -- -- -- 72.9 kg (160 lb 12.8 oz)   25 0825 -- -- -- -- 1.651 m (5' 5\") --   ]  Gen: awake, alert, answering questions appropriately, appears comfortable walking around room     FHT:   Baseline 136  Variability moderate  Accels Present  Decels Absent     [place]: 2 contractions in 10 min    Membranes: SROM at 1035 meconium stained fluid    A/P: 29 year old  at 41w0d by LMP c/w 8w0d us who presents for induction of labor for late term gestation, now s/p SROM with meconium fluid.      Induction of labor:  Cook balloon placed with 70ml in uterine balloon at 0952.  This was removed after SROM occurred at 1035.    Discussed with patient recommendation to continue cervical ripening.  Transition to PO misoprostol at this time for cervical ripening.  Will plan to recheck cervix 2 hours after third dose or earlier if maternal or fetal indications.  Pain- patient plans epidural  Would like anesthesia team to be aware of acetylcholinesterase deficiency, In event patient needs general anesthesia     FWB: Category 1, reactive.    Meconium stained fluid.  Discussed with patient plan for NICU to attend delivery.     Nani Christopher MD 12:01 PM    "

## 2025-04-03 NOTE — PLAN OF CARE
Goal Outcome Evaluation:      Plan of Care Reviewed With: patient, spouse    Overall Patient Progress: no change.     Patient reports infrequent mild cramping sensations. Ctx pattern inconsistent at this time. SVE at 1800 revealed 2/60%/-2 (Perez 5). Continuing PO misoprostol Q2H until Perez greater than or equal to 6, then start Pitocin. Patient & support people agreeable with plan. Patient moving around room/ unit, changing positions, eating, drinking, resting. FHR baseline throughout shift 135-160 bpm. Category I tracing. SROM at 1035 revealed meconium-stained fluid. Q1H temps WNL.

## 2025-04-04 LAB
CREAT SERPL-MCNC: 0.64 MG/DL (ref 0.51–0.95)
EGFRCR SERPLBLD CKD-EPI 2021: >90 ML/MIN/1.73M2
ERYTHROCYTE [DISTWIDTH] IN BLOOD BY AUTOMATED COUNT: 13.7 % (ref 10–15)
HCT VFR BLD AUTO: 35.9 % (ref 35–47)
HGB BLD-MCNC: 12.2 G/DL (ref 11.7–15.7)
HOLD SPECIMEN: NORMAL
MCH RBC QN AUTO: 32.7 PG (ref 26.5–33)
MCHC RBC AUTO-ENTMCNC: 34 G/DL (ref 31.5–36.5)
MCV RBC AUTO: 96 FL (ref 78–100)
PLATELET # BLD AUTO: 176 10E3/UL (ref 150–450)
RBC # BLD AUTO: 3.73 10E6/UL (ref 3.8–5.2)
WBC # BLD AUTO: 24.5 10E3/UL (ref 4–11)

## 2025-04-04 PROCEDURE — 258N000003 HC RX IP 258 OP 636

## 2025-04-04 PROCEDURE — 88307 TISSUE EXAM BY PATHOLOGIST: CPT | Mod: 26 | Performed by: PATHOLOGY

## 2025-04-04 PROCEDURE — 250N000011 HC RX IP 250 OP 636

## 2025-04-04 PROCEDURE — 250N000009 HC RX 250: Performed by: OBSTETRICS & GYNECOLOGY

## 2025-04-04 PROCEDURE — 36415 COLL VENOUS BLD VENIPUNCTURE: CPT | Performed by: OBSTETRICS & GYNECOLOGY

## 2025-04-04 PROCEDURE — 82565 ASSAY OF CREATININE: CPT | Performed by: OBSTETRICS & GYNECOLOGY

## 2025-04-04 PROCEDURE — 85018 HEMOGLOBIN: CPT

## 2025-04-04 PROCEDURE — 120N000002 HC R&B MED SURG/OB UMMC

## 2025-04-04 PROCEDURE — 250N000011 HC RX IP 250 OP 636: Performed by: STUDENT IN AN ORGANIZED HEALTH CARE EDUCATION/TRAINING PROGRAM

## 2025-04-04 PROCEDURE — 36415 COLL VENOUS BLD VENIPUNCTURE: CPT

## 2025-04-04 PROCEDURE — 258N000003 HC RX IP 258 OP 636: Performed by: STUDENT IN AN ORGANIZED HEALTH CARE EDUCATION/TRAINING PROGRAM

## 2025-04-04 PROCEDURE — 258N000003 HC RX IP 258 OP 636: Performed by: OBSTETRICS & GYNECOLOGY

## 2025-04-04 PROCEDURE — 722N000001 HC LABOR CARE VAGINAL DELIVERY SINGLE

## 2025-04-04 PROCEDURE — 250N000011 HC RX IP 250 OP 636: Mod: JZ | Performed by: OBSTETRICS & GYNECOLOGY

## 2025-04-04 PROCEDURE — 250N000013 HC RX MED GY IP 250 OP 250 PS 637

## 2025-04-04 PROCEDURE — 250N000009 HC RX 250

## 2025-04-04 PROCEDURE — 0KQM0ZZ REPAIR PERINEUM MUSCLE, OPEN APPROACH: ICD-10-PCS | Performed by: OBSTETRICS & GYNECOLOGY

## 2025-04-04 PROCEDURE — 88307 TISSUE EXAM BY PATHOLOGIST: CPT | Mod: TC

## 2025-04-04 RX ORDER — CARBOPROST TROMETHAMINE 250 UG/ML
250 INJECTION, SOLUTION INTRAMUSCULAR
Status: DISCONTINUED | OUTPATIENT
Start: 2025-04-04 | End: 2025-04-06 | Stop reason: HOSPADM

## 2025-04-04 RX ORDER — FENTANYL/ROPIVACAINE/NS/PF 2MCG/ML-.1
PLASTIC BAG, INJECTION (ML) EPIDURAL
Status: DISCONTINUED | OUTPATIENT
Start: 2025-04-04 | End: 2025-04-04

## 2025-04-04 RX ORDER — LIDOCAINE 40 MG/G
CREAM TOPICAL
Status: DISCONTINUED | OUTPATIENT
Start: 2025-04-04 | End: 2025-04-04 | Stop reason: HOSPADM

## 2025-04-04 RX ORDER — LOPERAMIDE HYDROCHLORIDE 2 MG/1
2 CAPSULE ORAL
Status: DISCONTINUED | OUTPATIENT
Start: 2025-04-04 | End: 2025-04-06 | Stop reason: HOSPADM

## 2025-04-04 RX ORDER — HYDROCORTISONE 25 MG/G
CREAM TOPICAL 3 TIMES DAILY PRN
Status: DISCONTINUED | OUTPATIENT
Start: 2025-04-04 | End: 2025-04-06 | Stop reason: HOSPADM

## 2025-04-04 RX ORDER — LIDOCAINE HYDROCHLORIDE 10 MG/ML
INJECTION, SOLUTION EPIDURAL; INFILTRATION; INTRACAUDAL; PERINEURAL
Status: DISCONTINUED
Start: 2025-04-04 | End: 2025-04-04 | Stop reason: HOSPADM

## 2025-04-04 RX ORDER — OXYTOCIN 10 [USP'U]/ML
INJECTION, SOLUTION INTRAMUSCULAR; INTRAVENOUS
Status: DISCONTINUED
Start: 2025-04-04 | End: 2025-04-04 | Stop reason: HOSPADM

## 2025-04-04 RX ORDER — OXYTOCIN 10 [USP'U]/ML
10 INJECTION, SOLUTION INTRAMUSCULAR; INTRAVENOUS
Status: DISCONTINUED | OUTPATIENT
Start: 2025-04-04 | End: 2025-04-06 | Stop reason: HOSPADM

## 2025-04-04 RX ORDER — OXYTOCIN/0.9 % SODIUM CHLORIDE 30/500 ML
340 PLASTIC BAG, INJECTION (ML) INTRAVENOUS CONTINUOUS PRN
Status: DISCONTINUED | OUTPATIENT
Start: 2025-04-04 | End: 2025-04-06 | Stop reason: HOSPADM

## 2025-04-04 RX ORDER — NALOXONE HYDROCHLORIDE 0.4 MG/ML
0.2 INJECTION, SOLUTION INTRAMUSCULAR; INTRAVENOUS; SUBCUTANEOUS
Status: DISCONTINUED | OUTPATIENT
Start: 2025-04-04 | End: 2025-04-06 | Stop reason: HOSPADM

## 2025-04-04 RX ORDER — LOPERAMIDE HYDROCHLORIDE 2 MG/1
4 CAPSULE ORAL
Status: DISCONTINUED | OUTPATIENT
Start: 2025-04-04 | End: 2025-04-06 | Stop reason: HOSPADM

## 2025-04-04 RX ORDER — ACETAMINOPHEN 325 MG/1
650 TABLET ORAL EVERY 4 HOURS PRN
Status: DISCONTINUED | OUTPATIENT
Start: 2025-04-04 | End: 2025-04-06 | Stop reason: HOSPADM

## 2025-04-04 RX ORDER — SODIUM CHLORIDE, SODIUM LACTATE, POTASSIUM CHLORIDE, CALCIUM CHLORIDE 600; 310; 30; 20 MG/100ML; MG/100ML; MG/100ML; MG/100ML
INJECTION, SOLUTION INTRAVENOUS CONTINUOUS PRN
Status: DISCONTINUED | OUTPATIENT
Start: 2025-04-04 | End: 2025-04-04 | Stop reason: HOSPADM

## 2025-04-04 RX ORDER — NALOXONE HYDROCHLORIDE 0.4 MG/ML
0.4 INJECTION, SOLUTION INTRAMUSCULAR; INTRAVENOUS; SUBCUTANEOUS
Status: DISCONTINUED | OUTPATIENT
Start: 2025-04-04 | End: 2025-04-06 | Stop reason: HOSPADM

## 2025-04-04 RX ORDER — METHYLERGONOVINE MALEATE 0.2 MG/ML
200 INJECTION INTRAVENOUS
Status: DISCONTINUED | OUTPATIENT
Start: 2025-04-04 | End: 2025-04-06 | Stop reason: HOSPADM

## 2025-04-04 RX ORDER — AMOXICILLIN 250 MG
2 CAPSULE ORAL
Status: DISCONTINUED | OUTPATIENT
Start: 2025-04-04 | End: 2025-04-06 | Stop reason: HOSPADM

## 2025-04-04 RX ORDER — AMPICILLIN 2 G/1
2 INJECTION, POWDER, FOR SOLUTION INTRAVENOUS EVERY 6 HOURS
Status: COMPLETED | OUTPATIENT
Start: 2025-04-04 | End: 2025-04-05

## 2025-04-04 RX ORDER — GENTAMICIN SULFATE 100 MG/100ML
1.5 INJECTION, SOLUTION INTRAVENOUS EVERY 8 HOURS
Status: COMPLETED | OUTPATIENT
Start: 2025-04-04 | End: 2025-04-05

## 2025-04-04 RX ORDER — IBUPROFEN 800 MG/1
800 TABLET, FILM COATED ORAL EVERY 6 HOURS PRN
Status: DISCONTINUED | OUTPATIENT
Start: 2025-04-04 | End: 2025-04-06 | Stop reason: HOSPADM

## 2025-04-04 RX ORDER — MISOPROSTOL 200 UG/1
TABLET ORAL
Status: DISCONTINUED
Start: 2025-04-04 | End: 2025-04-04 | Stop reason: HOSPADM

## 2025-04-04 RX ORDER — MISOPROSTOL 200 UG/1
400 TABLET ORAL
Status: DISCONTINUED | OUTPATIENT
Start: 2025-04-04 | End: 2025-04-06 | Stop reason: HOSPADM

## 2025-04-04 RX ORDER — ACETAMINOPHEN 325 MG/1
975 TABLET ORAL EVERY 6 HOURS
Status: DISCONTINUED | OUTPATIENT
Start: 2025-04-04 | End: 2025-04-05

## 2025-04-04 RX ORDER — OXYTOCIN/0.9 % SODIUM CHLORIDE 30/500 ML
1-24 PLASTIC BAG, INJECTION (ML) INTRAVENOUS CONTINUOUS
Status: DISCONTINUED | OUTPATIENT
Start: 2025-04-04 | End: 2025-04-04 | Stop reason: HOSPADM

## 2025-04-04 RX ORDER — FENTANYL/ROPIVACAINE/NS/PF 2MCG/ML-.1
PLASTIC BAG, INJECTION (ML) EPIDURAL
Status: DISCONTINUED | OUTPATIENT
Start: 2025-04-04 | End: 2025-04-04 | Stop reason: HOSPADM

## 2025-04-04 RX ORDER — MISOPROSTOL 200 UG/1
800 TABLET ORAL
Status: DISCONTINUED | OUTPATIENT
Start: 2025-04-04 | End: 2025-04-06 | Stop reason: HOSPADM

## 2025-04-04 RX ORDER — OXYTOCIN/0.9 % SODIUM CHLORIDE 30/500 ML
PLASTIC BAG, INJECTION (ML) INTRAVENOUS
Status: DISCONTINUED
Start: 2025-04-04 | End: 2025-04-04 | Stop reason: HOSPADM

## 2025-04-04 RX ORDER — TRANEXAMIC ACID 10 MG/ML
1 INJECTION, SOLUTION INTRAVENOUS EVERY 30 MIN PRN
Status: DISCONTINUED | OUTPATIENT
Start: 2025-04-04 | End: 2025-04-06 | Stop reason: HOSPADM

## 2025-04-04 RX ORDER — POLYETHYLENE GLYCOL 3350 17 G/17G
17 POWDER, FOR SOLUTION ORAL DAILY PRN
Status: DISCONTINUED | OUTPATIENT
Start: 2025-04-04 | End: 2025-04-06 | Stop reason: HOSPADM

## 2025-04-04 RX ORDER — SODIUM CHLORIDE 9 MG/ML
INJECTION, SOLUTION INTRAVENOUS
Status: COMPLETED
Start: 2025-04-04 | End: 2025-04-04

## 2025-04-04 RX ORDER — NALBUPHINE HYDROCHLORIDE 10 MG/ML
2.5-5 INJECTION INTRAMUSCULAR; INTRAVENOUS; SUBCUTANEOUS EVERY 6 HOURS PRN
Status: DISCONTINUED | OUTPATIENT
Start: 2025-04-04 | End: 2025-04-06 | Stop reason: HOSPADM

## 2025-04-04 RX ORDER — BISACODYL 10 MG
10 SUPPOSITORY, RECTAL RECTAL DAILY PRN
Status: DISCONTINUED | OUTPATIENT
Start: 2025-04-04 | End: 2025-04-06 | Stop reason: HOSPADM

## 2025-04-04 RX ADMIN — GENTAMICIN SULFATE 100 MG: 100 INJECTION, SOLUTION INTRAVENOUS at 22:40

## 2025-04-04 RX ADMIN — ACETAMINOPHEN 975 MG: 325 TABLET, FILM COATED ORAL at 13:26

## 2025-04-04 RX ADMIN — Medication 2 MILLI-UNITS/MIN: at 12:08

## 2025-04-04 RX ADMIN — KETOROLAC TROMETHAMINE 15 MG: 15 INJECTION, SOLUTION INTRAMUSCULAR; INTRAVENOUS at 13:35

## 2025-04-04 RX ADMIN — ACETAMINOPHEN 975 MG: 325 TABLET, FILM COATED ORAL at 20:12

## 2025-04-04 RX ADMIN — AMPICILLIN SODIUM 2 G: 2 INJECTION, POWDER, FOR SOLUTION INTRAMUSCULAR; INTRAVENOUS at 20:25

## 2025-04-04 RX ADMIN — Medication: at 06:33

## 2025-04-04 RX ADMIN — PSYLLIUM HUSK 1 PACKET: 3.4 POWDER ORAL at 20:16

## 2025-04-04 RX ADMIN — Medication 10 ML: at 08:15

## 2025-04-04 RX ADMIN — AMPICILLIN SODIUM 2 G: 2 INJECTION, POWDER, FOR SOLUTION INTRAMUSCULAR; INTRAVENOUS at 13:31

## 2025-04-04 RX ADMIN — SODIUM CHLORIDE, SODIUM LACTATE, POTASSIUM CHLORIDE, AND CALCIUM CHLORIDE: .6; .31; .03; .02 INJECTION, SOLUTION INTRAVENOUS at 09:54

## 2025-04-04 RX ADMIN — SODIUM CHLORIDE, SODIUM LACTATE, POTASSIUM CHLORIDE, AND CALCIUM CHLORIDE 500 ML: .6; .31; .03; .02 INJECTION, SOLUTION INTRAVENOUS at 08:46

## 2025-04-04 RX ADMIN — GENTAMICIN SULFATE 130 MG: 40 INJECTION, SOLUTION INTRAMUSCULAR; INTRAVENOUS at 14:11

## 2025-04-04 RX ADMIN — SODIUM CHLORIDE, SODIUM LACTATE, POTASSIUM CHLORIDE, AND CALCIUM CHLORIDE 500 ML: .6; .31; .03; .02 INJECTION, SOLUTION INTRAVENOUS at 00:05

## 2025-04-04 RX ADMIN — Medication 10 ML: at 00:25

## 2025-04-04 RX ADMIN — Medication 5 ML: at 11:00

## 2025-04-04 RX ADMIN — IBUPROFEN 800 MG: 800 TABLET, FILM COATED ORAL at 20:12

## 2025-04-04 RX ADMIN — SODIUM CHLORIDE, SODIUM LACTATE, POTASSIUM CHLORIDE, AND CALCIUM CHLORIDE: .6; .31; .03; .02 INJECTION, SOLUTION INTRAVENOUS at 05:30

## 2025-04-04 RX ADMIN — Medication: at 00:24

## 2025-04-04 RX ADMIN — SODIUM CHLORIDE, SODIUM LACTATE, POTASSIUM CHLORIDE, AND CALCIUM CHLORIDE 250 ML: .6; .31; .03; .02 INJECTION, SOLUTION INTRAVENOUS at 02:11

## 2025-04-04 RX ADMIN — SODIUM CHLORIDE: 0.9 INJECTION, SOLUTION INTRAVENOUS at 20:30

## 2025-04-04 RX ADMIN — Medication: at 10:11

## 2025-04-04 RX ADMIN — LIDOCAINE HYDROCHLORIDE 20 ML: 10 INJECTION, SOLUTION EPIDURAL; INFILTRATION; INTRACAUDAL; PERINEURAL at 12:32

## 2025-04-04 ASSESSMENT — ACTIVITIES OF DAILY LIVING (ADL)
ADLS_ACUITY_SCORE: 23
ADLS_ACUITY_SCORE: 23
ADLS_ACUITY_SCORE: 18
ADLS_ACUITY_SCORE: 23
ADLS_ACUITY_SCORE: 23
ADLS_ACUITY_SCORE: 18
ADLS_ACUITY_SCORE: 23
ADLS_ACUITY_SCORE: 23
ADLS_ACUITY_SCORE: 18
ADLS_ACUITY_SCORE: 18
ADLS_ACUITY_SCORE: 23
ADLS_ACUITY_SCORE: 18
ADLS_ACUITY_SCORE: 23
ADLS_ACUITY_SCORE: 18
ADLS_ACUITY_SCORE: 23
ADLS_ACUITY_SCORE: 18

## 2025-04-04 NOTE — PROGRESS NOTES
Pt admitted for IOL d/t mec stained fluid. RN assumed care at 2300. VSS, EFM charted (see flowsheet), pt afebrile. Pt denies headache, vision changes, RUQ pain, bleeding. Pt coping with labor via epidural (initiated at 0018). SVE /-1 at 0555. Anticipate . Pt stated she has no questions or concerns with plan of care at this time. Call light within reach. Report given to Reyna OSMAN RN.

## 2025-04-04 NOTE — DISCHARGE SUMMARY
[facility] Discharge Summary    Patient: Melody Cash    YOB: 1995  MRN# [id number]      Date of Admission:  4/3/2025  Date of Discharge::  2025  Admitting Physician:  Nani Christopher MD  Discharge Physician:  Abi Vale MD          Admission Diagnoses:   -  at 41w1d  - Late term gestation   - Acetylcholinesterase deficiency           Discharge Diagnosis:   -  41w1d, now delivered  - Cat II FHT   - Triple I  - short umbilical cord           Procedures:     Procedure(s): -Vacuum Assisted Vaginal Delivery   -Epidural anesthesia            Medications Prior to Admission:     Medications Prior to Admission   Medication Sig Dispense Refill Last Dose/Taking    Prenatal Vit-Fe Fumarate-FA (PNV PRENATAL PLUS MULTIVITAMIN) 27-1 MG TABS per tablet    2025 Evening    [DISCONTINUED] doxylamine (UNISOM) 25 MG TABS tablet Take 12.5 mg by mouth at bedtime.   2025 Bedtime             Discharge Medications:        Review of your medicines        START taking        Dose / Directions   acetaminophen 325 MG tablet  Commonly known as: TYLENOL  Used for: Vacuum extraction, delivered, current hospitalization      Dose: 650 mg  Take 2 tablets (650 mg) by mouth every 6 hours as needed for mild pain. Start after Delivery.  Quantity: 100 tablet  Refills: 0     ibuprofen 600 MG tablet  Commonly known as: ADVIL/MOTRIN  Used for: Vacuum extraction, delivered, current hospitalization      Dose: 600 mg  Take 1 tablet (600 mg) by mouth every 6 hours as needed for moderate pain. Start after delivery  Quantity: 60 tablet  Refills: 0     senna-docusate 8.6-50 MG tablet  Commonly known as: SENOKOT-S/PERICOLACE  Used for: Vacuum extraction, delivered, current hospitalization      Dose: 1 tablet  Take 1 tablet by mouth daily. Start after delivery.  Quantity: 100 tablet  Refills: 0            CONTINUE these medicines which have NOT CHANGED        Dose / Directions    PNV Prenatal Plus Multivitamin 27-1 MG Tabs per tablet      Refills: 0            STOP taking      doxylamine 25 MG Tabs tablet  Commonly known as: UNISOM                  Where to get your medicines        These medications were sent to [facility] [place], [place] - [address]  [address] [facility], [facility] [address]      Phone: [phone]   acetaminophen 325 MG tablet  ibuprofen 600 MG tablet  senna-docusate 8.6-50 MG tablet               Consultations:   Anesthesia  NICU  Lactation            Brief Admission History   Melody Cash is a 29 year old now  who presented at 41w0d for late term gestation IOL.          Brief Intrapartum Course:   Melody Cash is a 29 year old now  who presented at 41w0d for late term gestation IOL.  Pregnancy was uncomplicated. Her IOL began with cook balloon placement, one hour after placement patient had SROM with MSF, and cook was removed. She received misoprostol for further cervical ripening. She was augmented with pitocin.  Labor course was complicated by fetal tachycardia, maternal leukocytosis, and became febrile at time of delivery. Epidural was administered for pain control. She progressed to complete, was pushing with good maternal effort, but had a persistently category II tracing in second stage with deep prolonged variable decels, fetal tachycardia to 180's. Recommended VAVD for fetal distress.      After discussion of risks including but not limited to fetal (cephalohematoma, retinal hemorrhage, ICH, scalp laceration) and maternal (larger perineal laceration, failure and need for emergent CS) verbal, informed consent was obtained from patient. She agreed to proceed with VAVD. Fetal head was at +2 station, in POLLO position. Vacuum applied to fetal head at 2-3 cm from posterior occiput. Pressure applied, first pull for 40 seconds resulting in good descent of fetal head.  Pressure released until next contraction then reapplied for   seconds. 2nd pull resulted in fetal , vacuum removed and maternal efforts used to deliver the baby at 1224 on .             Hospital Course:   The patient's hospital course was unremarkable. On discharge, her pain was well controlled. Vaginal bleeding is similar to peak menstrual flow. Voiding without difficulty.  Ambulating well and tolerating a normal diet. Breastfeeding well.  Infant is stable. She was discharged on post-partum day #2.    Post-partum hemoglobin: 11.9          Discharge Instructions and Follow-Up:     Discharge diet: Regular   Discharge activity: Pelvic rest for 6 weeks including no sexual intercourse, tampons, or douching.    Discharge follow-up: Follow up with your primary OB for a routine postpartum visit in 6 weeks             Discharge Disposition:     Discharged to home in stable condition        Evan Crane MD, MSc  [facility], PGY-1  2025 12:19 PM      Physician Attestation   I, Abi Vale, have seen and examined this patient.  I have reviewed the above note and agree with discharge plan as documented in the above note.     Abi Vale MD  Date of Service (when I saw the patient): 25

## 2025-04-04 NOTE — PLAN OF CARE
Goal Outcome Evaluation:      Plan of Care Reviewed With: patient, spouse      VE assisted vaginal birth of baby girl at 1226, Dr Vale and Amelia, [facility], Briseida Wall RN and writer in attendance. VE was applied at 1217, suction applied per md to green zone at 1218 for 40 sec of pull and patient pushing. VE suction off after the 40 second, then suction applied per md to green zone at 1223 for 20 seconds of pull and patient push. VE removed at 1223, patient continued to push and delivered baby at 1226. See delivery summary. Umbilical cord was short and measured 22cm. Maternal temp after delivery was 101.6 oral at 1229. Antibiotics were ordered, administered tylenol. Hand off to Odessa KRAUSE at 1345 who finished recovery. Continue plan of care.

## 2025-04-04 NOTE — PROGRESS NOTES
[facility]  Labor Progress Note    Subjective:  Patient more comfortable after epidural placement but still feeling pain with contractions on right side. Amenable to cervical exam    Objective:   Patient Vitals for the past 4 hrs:   BP SpO2   25 0142 110/63 95 %   25 0100 111/72 96 %     SVE: 3/70/-2  Membranes: s/p SROM (1035)    FHT: Baseline 140, moderate variability, +accelerations, no decelerations  [place]: 4 contractions in 10 minutes    Assessment/Plan:  Melody Cash is a 29 year old  at 41w0d by LMP, here for IOL .    Labor:  - s/p jeffers balloon (removed after SROM) and PO miso x4. Last dose of misoprostol was several hours ago and patient is continuing to regularly contract. She is making cervical change. Offered pitocin as augmentation vs continued expectant management. Her epidural was recently bolused and she would like to work on her pain control before proceeding with augmentation.  - Pain management: Desires epidural for analgesia  - FWB: Cat 1, reactive and reassuring. Continue EFM and toco.    Moon Altamirano MD  Obstetrics & Gynecology, PGY-2  2025 3:08 AM

## 2025-04-04 NOTE — PROGRESS NOTES
Patient arrived to [facility] unit via wheelchair at 1645 ,with belongings, accompanied by spouse/ significant other, with infant in arms. Received report from  Odessa KRAUSE  and checked bands. Unit and room orientation completd. Call light within arms reach; no concerns present at this time. Continue with plan of care.

## 2025-04-04 NOTE — PLAN OF CARE
VSS. Patient tolerating labor well.  Utilizing labor positions, birth-ball and other labor support items.  See flow sheets for FHR and contraction pattern.  Support persons is at bedside.  Will continue to monitor and notify provider if change of status.  Anticipate . Report given to Beab PERSAUD RN

## 2025-04-04 NOTE — PLAN OF CARE
Data: Vital signs stable. Postpartum assessment WDL. Pain controlled with Tylenol, Toradol, and cold pack to perineum. Patient voiding without difficulty. Breastfeeding on cue with minimal assistance. Melody Cash transferred to [facility] via wheelchair at 1630. Baby transferred via parent's arms.  Action: Receiving unit notified of transfer: Yes. Patient and family notified of room change. Report given to Cara Castro RN at 1620. Belongings sent to receiving unit. Accompanied by Registered Nurse. Oriented patient to surroundings. Call light within reach. ID bands double-checked with receiving RN.  Response: Patient tolerated transfer and is stable.  Will continue with current plan of care.   Goal Outcome Evaluation:      Plan of Care Reviewed With: patient, spouse    Overall Patient Progress: improving

## 2025-04-04 NOTE — PROGRESS NOTES
[facility]  Labor Progress Note    Subjective:  Patient feeling more uncomfortable with contractions and desiring a cervical exam.     Objective:   Patient Vitals for the past 4 hrs:   BP Temp Temp src Resp   25 113/77 98.5  F (36.9  C) Oral 18   25 1850 -- 98.4  F (36.9  C) Oral --   25 1800 -- 97.9  F (36.6  C) Oral --     SVE: 2/70/-2  Membranes: s/p SROM (1035)    FHT: Baseline 140, moderate variability, +accelerations, no decelerations  [place]: 4 contractions in 10 minutes    Assessment/Plan:  Melody Cash is a 29 year old  at 41w0d by LMP, here for IOL .    Labor:  - s/p jeffers balloon (removed after SROM) and PO miso x4. Now deshawn borderline too frequently for misoprostol but is experiencing increasing intensity of contractions. Will expectantly manage with plan for likely pitocin if contractions space out  - Pain management: Desires epidural for analgesia  - FWB: Cat 1, reactive and reassuring. Continue EFM and toco.    Moon Altamirano MD  Obstetrics & Gynecology, PGY-2  2025 10:21 PM

## 2025-04-04 NOTE — L&D DELIVERY NOTE
L&D Delivery Note:     Melody Cash is a 29 year old now  who presented at 41w0d for late term gestation IOL.  Pregnancy was uncomplicated. Her IOL began with cook balloon placement, one hour after placement patient had SROM with MSF, and cook was removed. She received misoprostol for further cervical ripening. She was augmented with pitocin.  Labor course was complicated by fetal tachycardia, maternal leukocytosis, and became febrile at time of delivery. Epidural was administered for pain control. She progressed to complete, was pushing with good maternal effort, but had a persistently category II tracing in second stage with deep prolonged variable decels, followed by fetal tachycardia to 180's. Recommended VAVD to expedite delivery.     After discussion of risks including but not limited to fetal (cephalohematoma, retinal hemorrhage, ICH, scalp laceration) and maternal (larger perineal laceration, failure and need for emergent CS) verbal, informed consent was obtained from patient. She agreed to proceed with VAVD. Fetal head was at +2 station, in POLLO position. Vacuum applied to fetal head at 2-3 cm from posterior occiput. Pressure applied, first pull for 40 seconds resulting in good descent of fetal head.  Pressure released until next contraction then reapplied for 20 seconds. 2nd pull resulted in fetal , vacuum removed and maternal efforts used to deliver the baby at 1224 on .  No nuchal cord noted, but a very short cord ~ 22 cm was noted.   Apgars of 3, 7 and 9 with weight of 2964 grams.  The cord was double clamped immediately due to poor tone.  A cord segment and cord blood were obtained.  30U of IV pitocin was started. The placenta was then delivered using gentle traction and suprapubic pressure.  The uterus was noted to be firm after fundal massage.  The perineum was intact but there was a 2nd degree vaginal tear repaired in standard fashion using 3-0 Vicryl suture. Rectal exam confirmed  sphincter intact.  On final examination of the perineum, the repair was noted to be hemostatic.  Total QBL was 245 mL.  The placenta appeared intact with a 3V umbilical cord.  Dr. Vale was present for the entire procedure.       Maria G Cisneros MD  Obstetrics and Gynecology, PGY-2  04/04/25 3:41 PM      Physician Attestation   I was present for this vacuum assisted vaginal delivery and the repair of the above noted lacerations.  I have reviewed and edited the above delivery report to reflect the exact findings and details of the birth.  Mom febrile to 101.6 immediately after delivery.  She was 99.4 about 45 min prior to delivery.  Her WBC was elevated to 24 a couple hours prior to birth.  Calling this chorio and starting abx on mom.  discussed findings with NICU team to monitor baby accordingly.   Abi Vale MD   April 4, 2025

## 2025-04-04 NOTE — PROVIDER NOTIFICATION
Discussing possible need for VE assisted delivery. Consented patient, she agrees with plan of care

## 2025-04-04 NOTE — PLAN OF CARE
Anesthesia consulted per patient request, she was having the lower right side and back pain, she had been using PCEA controller. Anesthesia increased the intermittent bolus dose. Patient satisfied with pain management at this time. Anesthesia will administer a bolus when she starts pushing. Continue plan of care.

## 2025-04-04 NOTE — PROGRESS NOTES
[facility]  Labor Progress Note    Subjective:  Much more comfortable after adjustment of her epidural catheter. Amenable to exam.    Objective:   Patient Vitals for the past 4 hrs:   BP Temp Temp src Resp SpO2   255 103/59 98.3  F (36.8  C) Oral 16 96 %   25 0320 104/61 -- -- -- 95 %   25 0315 102/64 -- -- -- 95 %   25 0310 101/62 -- -- -- 95 %   25 0305 -- -- -- -- 95 %   25 0300 107/59 -- -- -- 96 %   25 99/56 -- -- -- 95 %   25 0250 -- -- -- -- 95 %   25 0245 117/58 -- -- -- 94 %   25 0240 105/65 -- -- -- 95 %   25 0235 110/65 -- -- -- 94 %     SVE: 5/70/-2  Membranes: s/p SROM (1035)    FHT: Baseline 150, moderate variability, +accelerations, one 3 minute deceleration (resolved with position change)  [place]: 4 contractions in 10 minutes    Assessment/Plan:  Melody Cash is a 29 year old  at 41w0d by LMP, here for IOL .    Labor:  - s/p jeffers balloon (removed after SROM) and PO miso x4. Last dose of misoprostol was several hours ago and patient is now spontaneously progressing. Will continue to expectantly manage and start pitocin if labor stalls  - Pain management: Desires epidural for analgesia  - FWB: Cat 1, reactive and reassuring. Suspect the prolong deceleration was associated with fetal descent. Continue EFM and toco.    Moon Altamirano MD  Obstetrics & Gynecology, PGY-2  2025 6:34 AM

## 2025-04-04 NOTE — ANESTHESIA PROCEDURE NOTES
"Epidural catheter Procedure Note    Pre-Procedure   Staff -        Anesthesiologist:  Mandie Ingram MD       Resident/Fellow: Ryan Brewster MD       Performed By: resident and with residents       Procedure performed by resident/fellow/CRNA in presence of a teaching physician.         Location: OB       Procedure Start/Stop Times: 4/4/2025 12:15 PM and 4/4/2025 12:25 PM       Pre-Anesthestic Checklist: patient identified, IV checked, risks and benefits discussed, informed consent, monitors and equipment checked, pre-op evaluation, at physician/surgeon's request and post-op pain management  Timeout:       Correct Patient: Yes        Correct Procedure: Yes        Correct Site: Yes        Correct Position: Yes   Procedure Documentation  Procedure: epidural catheter         Patient Position: sitting       Patient Prep/Sterile Barriers: sterile gloves, mask, patient draped       Skin prep: Betadine and Chloraprep       Local skin infiltrated with mL of 1% lidocaine.        Insertion Site: L3-4, L2-3. (midline approach).       Technique: LORT saline        HERNANDEZ at 5 cm.       Needle Type: Touhy needle       Needle Gauge: 17.        Needle Length (Inches): 3.5        Catheter: 19 G.          Catheter threaded easily.         5 cm epidural space.         Threaded 10 cm at skin.         # of attempts: 1 and  # of redirects:  0    Assessment/Narrative         Paresthesias: No.       Test dose of mL lidocaine 1.5% w/ 1:200,000 epinephrine at 12:22 CDT.        .       Insertion/Infusion Method: LORT saline       Aspiration negative for Heme or CSF via Epidural Catheter.    Medication(s) Administered   0.125% bupivacaine (Epidural) - EPIDURAL   10 mL - 4/4/2025 12:25:00 AM  Medication Administration Time: 4/4/2025 12:15 PM     Comments:          FOR [facility] ([facility] ONLY:   Pain Team Contact information: please page the Pain Team Via [employer] Search \"Pain\". During daytime hours, please page the " attending first. At night please page the resident first.

## 2025-04-04 NOTE — PLAN OF CARE
Anesthesia consulted for pain management at 0801, an epidural bolus was given, scheduled bolus increased to 8ml. She was reassessed at 0855, pain improved. Baseline , Dr Vale and Dr Cisneros reviewed EFM tracing. 160, moderate variability, accels present and no decels noted. Received order for 500cc LR bolus, and CBC. Patient agrees to plan. VSS afebrile. Planning straight cath at 0930 and SVE. Continue plan of care.

## 2025-04-04 NOTE — PLAN OF CARE
Late entry Provider notification:   Provider: Dr Kavin PEREZ present at 1 hour pushing: continuation of second stage of labor.      Second Stage of Labor Algorithm    Length of active pushin      Passenger:  See flowsheets for fetal heart rate tracing data.   EFM interpretation suggests: fetal tachycardia, 180 between repetitive mild variable decelerations, with moderate variability.    Position  Fetal position indeterminate  Fetal station: +1    Power  See flowsheets for uterine activity data.  Contraction frequency: every 2 minutes.   Contraction strength: strong by palpation.  Maternal pushing effort: pushing effectively  Pushing Position: semi-Fowlers and tug of war  Maternal current position change frequency: every 30 minutes    Psyche  Epidural / ITN: Yes Where is the patient located?  Maternal pain management: coping  Urge to push: present      Plan  After discussion with provider:  Interventions to optimize second stage:  active pushing and maternal position change frequency: every 30  Continue pushing, NICU in attendance for delivery

## 2025-04-04 NOTE — PLAN OF CARE
"Goal Outcome Evaluation:      Plan of Care Reviewed With: patient    Overall Patient Progress: improvingOverall Patient Progress: improving    Problem: Adult Inpatient Plan of Care  Goal: Patient-Specific Goal (Individualized)  Description: You can add care plan individualizations to a care plan. Examples of Individualization might be:  \"Parent requests to be called daily at 9am for status\", \"I have a hard time hearing out of my right ear\", or \"Do not touch me to wake me up as it startlesme\".  Outcome: Progressing     Problem: Adult Inpatient Plan of Care  Goal: Optimal Comfort and Wellbeing  Outcome: Progressing  Intervention: Provide Person-Centered Care  Recent Flowsheet Documentation  Taken 4/4/2025 1700 by Cara Rene RN  Trust Relationship/Rapport:   care explained   choices provided   emotional support provided   empathic listening provided   questions answered   questions encouraged   reassurance provided   thoughts/feelings acknowledged  Data: VSS and postpartum checks WNL. Patient eating and drinking normally. Patient  up ambulating. Patient performing self care and able to care for infant. Fundus at 1 cm below U and firm  without massage.  lochia scant and  no blood clots. Needs one more measuring. IV saline locked.   Action: Patient taking Tylenol for pain and pain tolerable, offered Ibuprofen but patient declined at this time. Encouraged patient to breast feed every 2 - 3 hours and to monitor for cues to feed infant. Patient education done( education record).   Response: Patient participating in infant's care by holding and feeding. Support/ spouse present at bedside and attentive to infant and patient.   Plan: Continue with the plan of cares.        "

## 2025-04-04 NOTE — PROGRESS NOTES
Labor Progress Note    S: Patient still having discomfort with contractions, anesthesia did provide a bolus, this provided temporary minimal relief. Denies fevers, chills.     O: Visit Vitals  /69   Temp 99.8  F (37.7  C) (Oral)   Resp 22   Gen: NAD  SVE: 9/100/-1  FHT: 160, moderate variability, accels, no decels  [place]: Difficulty tracing, poss 3-4 in 10    A/P: 28 yo admitted for late term induction. Anesthesia will re-evaluate. Now in active labor, fwb intermittently category II d/t fetal tachycardia, maternal WBC elevated at 24, no maternal fever, not meeting criteria for triple I. Will CTM, 500 ml bolus given. Anticipate .     Maria G Cisneros MD  Obstetrics and Gynecology, PGY-2  2025 11:07 AM

## 2025-04-05 LAB
ERYTHROCYTE [DISTWIDTH] IN BLOOD BY AUTOMATED COUNT: 14.1 % (ref 10–15)
HCT VFR BLD AUTO: 29.8 % (ref 35–47)
HGB BLD-MCNC: 10 G/DL (ref 11.7–15.7)
MCH RBC QN AUTO: 32.3 PG (ref 26.5–33)
MCHC RBC AUTO-ENTMCNC: 33.6 G/DL (ref 31.5–36.5)
MCV RBC AUTO: 96 FL (ref 78–100)
PLATELET # BLD AUTO: 172 10E3/UL (ref 150–450)
RBC # BLD AUTO: 3.1 10E6/UL (ref 3.8–5.2)
WBC # BLD AUTO: 27.6 10E3/UL (ref 4–11)

## 2025-04-05 PROCEDURE — 250N000011 HC RX IP 250 OP 636

## 2025-04-05 PROCEDURE — 36415 COLL VENOUS BLD VENIPUNCTURE: CPT

## 2025-04-05 PROCEDURE — 120N000002 HC R&B MED SURG/OB UMMC

## 2025-04-05 PROCEDURE — 85027 COMPLETE CBC AUTOMATED: CPT

## 2025-04-05 PROCEDURE — 250N000013 HC RX MED GY IP 250 OP 250 PS 637

## 2025-04-05 RX ADMIN — ACETAMINOPHEN 650 MG: 325 TABLET, FILM COATED ORAL at 21:20

## 2025-04-05 RX ADMIN — AMPICILLIN SODIUM 2 G: 2 INJECTION, POWDER, FOR SOLUTION INTRAMUSCULAR; INTRAVENOUS at 08:44

## 2025-04-05 RX ADMIN — PSYLLIUM HUSK 1 PACKET: 3.4 POWDER ORAL at 21:20

## 2025-04-05 RX ADMIN — GENTAMICIN SULFATE 100 MG: 100 INJECTION, SOLUTION INTRAVENOUS at 06:42

## 2025-04-05 RX ADMIN — ACETAMINOPHEN 975 MG: 325 TABLET, FILM COATED ORAL at 02:13

## 2025-04-05 RX ADMIN — ACETAMINOPHEN 650 MG: 325 TABLET, FILM COATED ORAL at 08:51

## 2025-04-05 RX ADMIN — AMPICILLIN SODIUM 2 G: 2 INJECTION, POWDER, FOR SOLUTION INTRAMUSCULAR; INTRAVENOUS at 02:13

## 2025-04-05 ASSESSMENT — ACTIVITIES OF DAILY LIVING (ADL)
ADLS_ACUITY_SCORE: 23
ADLS_ACUITY_SCORE: 22

## 2025-04-05 NOTE — PLAN OF CARE
Goal Outcome Evaluation:      Plan of Care Reviewed With: patient    Overall Patient Progress: improvingOverall Patient Progress: improving    Problem: Postpartum (Vaginal Delivery)  Goal: Optimal Pain Control and Function  Intervention: Prevent or Manage Pain  Recent Flowsheet Documentation  Taken 4/5/2025 0030 by Brenda Albert RN  Perineal Care: perineal hygiene encouraged     Problem: Postpartum (Vaginal Delivery)  Goal: Successful Parent Role Transition  Intervention: Support Parent Role Transition  Recent Flowsheet Documentation  Taken 4/5/2025 0030 by Brenda Albert RN  Supportive Measures:   active listening utilized   decision-making supported   positive reinforcement provided   problem-solving facilitated   relaxation techniques promoted   self-care encouraged   verbalization of feelings encouraged  Parent-Child Attachment Promotion:   caring behavior modeled   cue recognition promoted   face-to-face positioning promoted   interaction encouraged   parent/caregiver presence encouraged   participation in care promoted   positive reinforcement provided   rooming-in promoted   skin-to-skin contact encouraged   strengths emphasized    Data: Vital signs within normal limits. Postpartum checks within normal limits - see flow record. Patient eating and drinking normally. Patient able to empty bladder independently and is up ambulating. Patient on Ampicillin every 6 hours and Gentamicin every 8 hours. Patient performing self cares and is able to care for infant.  Action: Patient medicated during the shift for pain and cramping. See MAR. Patient reassessed within 1 hour after each medication and pain was improved - patient stated she was comfortable. Patient education done about self care- see flow record.  Response: Positive attachment behaviors observed with infant. Support person spouse present.   Plan: Anticipate discharge on 4/6/2025.

## 2025-04-05 NOTE — PROGRESS NOTES
Obstetrics Postpartum Progress Note  MRN: [id number]  PPD#1 after VAVD     S: Patient states she is doing well.  Pain is well controlled with current pain regimen. Lochia similar to menses.  Eating and drinking without nausea/vomiting. Ambulating without difficulty.  Voiding on her own spontaneously.  Denies fevers or chills. Breastfeeding. Denies headache or vision changes. No other concerns  O:  Vitals:    25 1700 25 2019 25 0030 25 0608   BP: 101/63 104/62 107/74 100/60   BP Location:  Left arm Left arm Left arm   Patient Position:  Semi-Castaneda's Semi-Castaneda's Semi-Castaneda's   Cuff Size: Adult Regular Adult Regular Adult Regular Adult Regular   Pulse:   72 68   Resp: 18 16 16 17   Temp: 97.8  F (36.6  C) 97.9  F (36.6  C) 97.9  F (36.6  C) 97.8  F (36.6  C)   TempSrc: Oral Oral Oral Oral   SpO2: 98% 99%     Weight:       Height:           Gen:  NAD  CV: regular rate, well-perfused  Resp: nonlabored breathing on room air, normal inspiratory effort  Abd: soft, nondistended, nontender, fundus firm    Weight:    Vitals:    25 0827 25 1300   Weight: 72.9 kg (160 lb 12.8 oz) 72.9 kg (160 lb 12.8 oz)         Hemoglobin   Date Value Ref Range Status   2025 10.0 (L) 11.7 - 15.7 g/dL Final   2025 12.2 11.7 - 15.7 g/dL Final       Rubella Antibody IgG   Date Value Ref Range Status   2024 No detectable antibody.  Final       Assessment and Plan: 29 year old  PPD#1  s/p VAVD, doing well postpartum. Intrapartum course c/b triple I being treated with ampicillin and gentamicin. Now meeting postpartum goals.    Triple I  - No fundal tenderness, no fever.  - Tm /l 101.6  - WBC 24; AM CBC pending   - Will stop amp/gent at 24h from delivery if remains afebrile    Postpartum:  Heme:Hgb 12.2 >  > AM hgb. No signs or symptoms of ABLA. Will discharge home with PO iron if hgb <10.  Pain control: pain is well-controlled with tylenol and ibuprofen, continue  Rh pos/Rubella  non-immune; MMR PTD  Routine cares:  Feed:  breast  Encourage ambulation  Continue regular diet  Continue sched bowel regimen, prn antiemetics  Contraception: condoms. Discussed adequate pregnancy spacing of 18 months, and ability to conceive with next cycle      Dispo: Anticipate discharge home tomorrow    Moon Altamirano MD  Obstetrics & Gynecology, PGY-2  04/05/2025 9:08 AM

## 2025-04-05 NOTE — PROGRESS NOTES
40w5d  Still hanging in there.  No signs of labor yet.  Normal FM.  Would like membranes stripped.  Cervix 1.5cm, long and high.  IOL scheduled in 2d.  Aarti Flowers MD

## 2025-04-05 NOTE — LACTATION NOTE
This note was copied from a baby's chart.  Consult for:  First time breastfeeding     Infant Name: Keyanna    Infant's Primary Care Clinic: [facility]    Delivery Information:  Keyanna was born at Gestational Age: 41w1d via vacuum assisted vaginal delivery on 2025 12:24 PM, AGA @ 6# 8.6 ounce birthweight. Has voided and passed stool in first day, 20 hours old at time of visit.     Maternal Health History:    Information for the patient's mother:  Melody Cash [[id number]]     Patient Active Problem List   Diagnosis    Celiac disease    Family history of melanoma    Pregnancy    and   Information for the patient's mother:  Melody Cash [[id number]]     Medications Prior to Admission   Medication Sig Dispense Refill Last Dose/Taking    doxylamine (UNISOM) 25 MG TABS tablet Take 12.5 mg by mouth at bedtime.   2025 Bedtime    Prenatal Vit-Fe Fumarate-FA (PNV PRENATAL PLUS MULTIVITAMIN) 27-1 MG TABS per tablet    2025 Evening         Maternal Breast Exam:  Melody noted breast growth and sensitivity in early pregnancy. Her breasts are soft, rounded, symmetrical with bilateral intact, everted nipples. She has been able to hand express colostrum. ?    Oral exam of baby:  not done, baby on breast throughout visit.    Feeding History: Parents share latch is improving they've been practicing, feedings going better this morning.     Feeding Assessment:  Keyanna latched on arrival with wide gape, lips flanged and asymmetric latch all of lower areola in her mouth. Mom denies discomfort while feeding. Able to point out signs of milk transfer with deeper jaw pulls, encouraged to listen for swallows with fridge fan off. Nipple mostly rounded when released, no creasing. Mom offered both sides, great feeding.     Education:   [x] Expected  feeding patterns in the first few days (pg. 38 of Your Guide to To Postpartum and [place] Care)/ the Second Night  [x] Stages of milk production  [x] Benefits of hand  expression of colostrum, encouraged frequently in first few days  [x] Early feeding cues     [x] Benefits of feeding on cue  [x] Benefits of skin to skin - encouraged often especially in early days  [x] Breastfeeding positions - nose to nipple  [x] Tips to get and maintain a deep latch  [x] Nutritive vs.non-nutritive sucking  [] Gentle breast compressions as needed to enhance milk transfer  [x] How to tell when baby is finished  [x] How to tell if baby is getting enough  [x] Expected  output  [x]  weight loss  [x] Infant Feeding Log  [] [employer] [facility] Breastfeeding/Pumping videos  [x] Signs breastfeeding is going well (comfortable latch, audible swallows, age appropriate output and weight loss)    [x] Tips to prevent engorgement  [x] Signs of engorgement  [x] Tips to manage engorgement, resources and how to do reverse pressure softening.  [] Pumping recommendations (based on patient need)  [] [facility] breast pump part/infant feeding supplies cleaning recommendations  [x] Inpatient breastfeeding support  [x] Outpatient lactation resources    Handouts: Infant Feeding Log (Week 1, Your Guide to Postpartum & [place] Care Book) and [facility] Lactation Resources    Home Breast Pump: Has new Spectra S1    Plan: Continue breastfeeding on cue with RN support as needed, goal of 8-12 feedings per day.     Encourage frequent skin to skin and hand expression.     Encouraged follow up with outpatient lactation consultant  as needed after discharge. Family plans to follow up with same LC they saw during pregnancy that was covered through their insurance.      Kendra Valentin, RN, IBCLC   Lactation Consultant  [place]: [facility] [phone]  Office: [phone]

## 2025-04-05 NOTE — PLAN OF CARE
"  Problem: Adult Inpatient Plan of Care  Goal: Patient-Specific Goal (Individualized)  Description: You can add care plan individualizations to a care plan. Examples of Individualization might be:  \"Parent requests to be called daily at 9am for status\", \"I have a hard time hearing out of my right ear\", or \"Do not touch me to wake me up as it startlesme\".  Outcome: Progressing     Problem: Adult Inpatient Plan of Care  Goal: Optimal Comfort and Wellbeing  Outcome: Progressing  Intervention: Provide Person-Centered Care  Recent Flowsheet Documentation  Taken 4/5/2025 1114 by Cara Rene RN  Trust Relationship/Rapport:   care explained   choices provided   emotional support provided   empathic listening provided   questions answered   questions encouraged   reassurance provided   thoughts/feelings acknowledged   Goal Outcome Evaluation:      Plan of Care Reviewed With: patient    Overall Patient Progress: improvingOverall Patient Progress: improving  Data: VSS and postpartum checks WNL. Patient eating and drinking normally. Patient able to empty bladder independently and up ambulating. Patient performing self care and able to care for infant.Fundus at U  and firm  without massage.  lochia scant and  no blood clots.  Action: Patient taking  Tylenol for and using Tucks pads along with  ice packs to manage pain. . Encouraged patient to breast feed every 2 - 3 hours and to monitor for cues to feed infant. Patient education done( education record).   Response: Patient participating in infant's care by feeding. Took shower this morning.  Positive attachment with infant observed . Support/ spouse present at bedside and attentive to infant and patient.   Plan: Continue with the plan of cares.              "

## 2025-04-06 VITALS
DIASTOLIC BLOOD PRESSURE: 74 MMHG | HEART RATE: 61 BPM | RESPIRATION RATE: 16 BRPM | HEIGHT: 65 IN | BODY MASS INDEX: 25.59 KG/M2 | WEIGHT: 153.6 LBS | SYSTOLIC BLOOD PRESSURE: 103 MMHG | OXYGEN SATURATION: 99 % | TEMPERATURE: 97.9 F

## 2025-04-06 LAB
ERYTHROCYTE [DISTWIDTH] IN BLOOD BY AUTOMATED COUNT: 14.2 % (ref 10–15)
HCT VFR BLD AUTO: 35.6 % (ref 35–47)
HGB BLD-MCNC: 11.9 G/DL (ref 11.7–15.7)
MCH RBC QN AUTO: 31.9 PG (ref 26.5–33)
MCHC RBC AUTO-ENTMCNC: 33.4 G/DL (ref 31.5–36.5)
MCV RBC AUTO: 95 FL (ref 78–100)
PLATELET # BLD AUTO: 235 10E3/UL (ref 150–450)
RBC # BLD AUTO: 3.73 10E6/UL (ref 3.8–5.2)
WBC # BLD AUTO: 18.4 10E3/UL (ref 4–11)

## 2025-04-06 PROCEDURE — 85027 COMPLETE CBC AUTOMATED: CPT | Performed by: OBSTETRICS & GYNECOLOGY

## 2025-04-06 PROCEDURE — 36415 COLL VENOUS BLD VENIPUNCTURE: CPT | Performed by: OBSTETRICS & GYNECOLOGY

## 2025-04-06 RX ORDER — AMOXICILLIN 250 MG
1 CAPSULE ORAL DAILY
Qty: 100 TABLET | Refills: 0 | Status: SHIPPED | OUTPATIENT
Start: 2025-04-06

## 2025-04-06 RX ORDER — ACETAMINOPHEN 325 MG/1
650 TABLET ORAL EVERY 6 HOURS PRN
Qty: 100 TABLET | Refills: 0 | Status: SHIPPED | OUTPATIENT
Start: 2025-04-06

## 2025-04-06 RX ORDER — IBUPROFEN 600 MG/1
600 TABLET, FILM COATED ORAL EVERY 6 HOURS PRN
Qty: 60 TABLET | Refills: 0 | Status: SHIPPED | OUTPATIENT
Start: 2025-04-06

## 2025-04-06 ASSESSMENT — ACTIVITIES OF DAILY LIVING (ADL)
ADLS_ACUITY_SCORE: 22

## 2025-04-06 NOTE — PLAN OF CARE
"  Problem: Adult Inpatient Plan of Care  Goal: Patient-Specific Goal (Individualized)  Description: You can add care plan individualizations to a care plan. Examples of Individualization might be:  \"Parent requests to be called daily at 9am for status\", \"I have a hard time hearing out of my right ear\", or \"Do not touch me to wake me up as it startlesme\".  Outcome: Progressing     Problem: Adult Inpatient Plan of Care  Goal: Optimal Comfort and Wellbeing  Outcome: Progressing  Intervention: Provide Person-Centered Care  Recent Flowsheet Documentation  Taken 4/6/2025 0912 by Cara Rene RN  Trust Relationship/Rapport:   care explained   choices provided   emotional support provided   empathic listening provided   questions answered   questions encouraged   reassurance provided   thoughts/feelings acknowledged   Goal Outcome Evaluation:      Plan of Care Reviewed With: patient    Overall Patient Progress: improvingOverall Patient Progress: improving  Data: VSS and postpartum checks WNL. Patient eating and drinking normally. Patient able to empty bladder independently and up ambulating. Patient performing self care and able to care for infant.Fundus at  1 cm below U  and firm  without massage.  lochia scant and  no blood clots.   Action: Patient declined pain medication at this time. Encouraged patient to breast feed every 2 - 3 hours and to monitor for cues to feed infant. Patient education done( education record). IV removed.   Response: Patient participating in infant's care by changing diapers and feeding. Positive attachment with infant observed . Support/ spouse present at bedside and attentive to infant and patient.   Plan: Continue with the plan of cares.            "

## 2025-04-06 NOTE — LACTATION NOTE
This note was copied from a baby's chart.  Follow Up Consult    Infant Name: Keyanna    Infant's Primary Care Clinic: [facility]    Maternal Assessment: Breasts remain soft, rounded, symmetric with tender but intact, everted nipples bilaterally.      Infant Assessment:  Keyanna has age appropriate output and weight loss.      Weight Change Since Birth: -2% at 24 hours old      Feeding History: Mom shares latching is going well, sometimes has to work at helping her to flange out lips and is a little sore today.       Feeding Assessment: Melody had baby latched on arrival with good seal, encouraged flange out lower lip more and offer, mom accept demo of ways to get deeper latch from the start. Demo both sandwiching of areola to make smaller oval for baby to latch onto and lifting nipple so lower areola bulges at lower lip, both ways focus on areola to lower lip while latching with nipple higher ending up tucking nipple into roof of mouth instead of onto tongue to help her get deeper latch. Melody note slight difference with more of lower areola in mouth, able to point out both before and after that change how baby has deep, relaxed jaw pulls as she is transferring colostrum.      Education:   [x] Expected  feeding patterns in the first few days (pg. 38 of Your Guide to To Postpartum and  Care)/ the Second Night  [x] Stages of milk production  [] Benefits of hand expression of colostrum  [] Early feeding cues     [] Benefits of feeding on cue  [] Benefits of skin to skin  [] Breastfeeding positions  [x] Tips to get and maintain a deep latch  [x] Nutritive vs.non-nutritive sucking  [] Gentle breast compressions as needed to enhance milk transfer  [] How to tell when baby is finished  [x] How to tell if baby is getting enough  [x] Expected  output  [x]  weight loss  [x] Infant Feeding Log  [] [employer] Breastfeeding/Pumping videos  [x] Signs breastfeeding is going well (comfortable  latch, audible swallows, age appropriate output and weight loss)    [] Tips to prevent engorgement  [x] Signs of engorgement  [x] Tips to manage engorgement - resources online for in the moment to refer to (booklet, websites)  [] Pumping recommendations (based on patient need)  [] [facility] breast pump part/infant feeding supplies cleaning recommendations  [x] Inpatient breastfeeding support  [x] Outpatient lactation resources    Handouts: Infant Feeding Log (Week 1, Your Guide to Postpartum &  Care Book) and [facility] Lactation Resources    Home Breast Pump: Has pump at home, Spectra S1    Plan: Continue feeding on cue at least 8 times daily working on deeper latch, track on feeding log for signs she is getting enough, call her provider if not reaching goals on log or other concerns.       Encouraged follow up with outpatient lactation consultant  as needed after discharge (make appt. Within a week and cancel if not needed?). Family plans to follow up with [facility].       Kendra Valentin RN, IBCLC   Lactation Consultant  [place]: [facility] [phone]  Office: [phone]

## 2025-04-06 NOTE — DISCHARGE INSTRUCTIONS
Warning Signs after Having a Baby    Keep this paper on your fridge or somewhere else where you can see it.    Call your provider if you have any of these symptoms up to 12 weeks after having your baby.    Thoughts of hurting yourself or your baby  Pain in your chest or trouble breathing  Severe headache not helped by pain medicine  Eyesight concerns (blurry vision, seeing spots or flashes of light, other changes to eyesight)  Fainting, shaking or other signs of a seizure    Call 9-1-1 if you feel that it is an emergency.     The symptoms below can happen to anyone after giving birth. They can be very serious. Call your provider if you have any of these warning signs.    My provider s phone number: _______________________    Losing too much blood (hemorrhage)    Call your provider if you soak through a pad in less than an hour or pass blood clots bigger than a golf ball. These may be signs that you are bleeding too much.    Blood clots in the legs or lungs    After you give birth, your body naturally clots its blood to help prevent blood loss. Sometimes this increased clotting can happen in other areas of the body, like the legs or lungs. This can block your blood flow and be very dangerous.     Call your provider if you:  Have a red, swollen spot on the back of your leg that is warm or painful when you touch it.   Are coughing up blood.     Infection    Call your provider if you have any of these symptoms:  Fever of 100.4 F (38 C) or higher.  Pain or redness around your stitches if you had an incision.   Any yellow, white, or green fluid coming from places where you had stitches or surgery.    Mood Problems (postpartum depression)    Many people feel sad or have mood changes after having a baby. But for some people, these mood swings are worse.     Call your provider right away if you feel so anxious or nervous that you can't care for yourself or your baby.    Preeclampsia (high blood pressure)    Even if you  "didn't have high blood pressure when you were pregnant, you are at risk for the high blood pressure disease called preeclampsia. This risk can last up to 12 weeks after giving birth.     Call your provider if you have:   Pain on your right side under your rib cage  Sudden swelling in the hands and face    Remember: You know your body. If something doesn't feel right, get medical help.     For informational purposes only. Not to replace the advice of your health care provider. Copyright 2020 [facility]. All rights reserved. Clinically reviewed by Carmelina Jarquin, RNC-OB, MSN. [employer] [id number] - Rev .  After Your Delivery (the Postpartum Period): Care Instructions  Overview     After childbirth (postpartum period), your body goes through many changes as you recover. In these weeks after delivery, try to take good care of yourself. Get rest whenever you can and accept help from others.  It may take 4 to 6 weeks to feel like yourself again, and possibly longer if you had a  birth. You may feel sore or very tired as you recover. After delivery, you may continue to have contractions as the uterus returns to the size it was before your pregnancy. You will also have some vaginal bleeding. And you may have pain around the vagina as you heal. Several days after delivery you may also have pain and swelling in your breasts as they fill with milk. There are things you can do at home to help ease these discomforts.  After childbirth, it's common to feel emotional. You may feel irritable, cry easily, and feel happy one minute and sad the next. This is called the \"baby blues.\" Hormone changes are one cause of these emotional changes. These feelings usually get better within a couple of weeks. If they don't, talk to your doctor or midwife.  In the first couple of weeks after you give birth, your doctor or midwife may want to check in with you and make a plan for follow-up care. You will likely have a " complete postpartum visit in the first 3 months after delivery. At that time, your doctor or midwife will check on your recovery and see how you're doing. But if you have questions or concerns before then, you can always call your doctor or midwife.  Follow-up care is a key part of your treatment and safety. Be sure to make and go to all appointments, and call your doctor if you are having problems. It's also a good idea to know your test results and keep a list of the medicines you take.  How can you care for yourself at home?  Taking care of your body  Use pads instead of tampons for bleeding. After birth, you will have bloody vaginal discharge. You may also pass some blood clots that shouldn't be bigger than an egg. Over the next 6 weeks or so, your bleeding should decrease a little every day and slowly change to a pinkish and then whitish discharge.  For cramps or mild pain, try an over-the-counter pain medicine, such as acetaminophen (Tylenol) or ibuprofen (Advil, Motrin). Read and follow all instructions on the label.  To ease pain around the vagina or from hemorrhoids:  Put ice or a cold pack on the area for 10 to 20 minutes at a time. Put a thin cloth between the ice and your skin.  Try sitting in a few inches of warm water (sitz bath) when you can or after bowel movements.  Clean yourself with a gentle squeeze of warm water from a bottle instead of wiping with toilet paper.  Use witch hazel or hemorrhoid pads (such as Tucks).  Try using a cold compress for sore and swollen breasts. And wear a supportive bra that fits.  Ease constipation by drinking plenty of fluids and eating high-fiber foods. Ask your doctor or midwife about over-the-counter stool softeners.  Activity  Rest when you can.  Ask for help from family or friends when you need it.  If you can, have another adult in your home for at least 2 or 3 days after birth.  When you feel ready, try to get some exercise every day. For many people, walking  is a good choice. Don't do any heavy exercise until your doctor or midwife says it's okay.  Ask your doctor or midwife when it is okay to have vaginal sex.  If you don't want to get pregnant, talk to your doctor or midwife about birth control options. You can get pregnant even before your period returns. Also, you can get pregnant while you are breastfeeding.  Talk to your doctor or midwife if you want to get pregnant again. They can talk to you about when it is safe.  Emotional health  It's normal to have some sadness, anxiety, and mood swings after delivery. It may help to talk with a trusted friend or family member. You can also call the Maternal Mental Health Hotline at 0-091-[facility] (1-175.990.3995) for support. If these mood changes last more than a couple of weeks, talk to your doctor or midwife.  When should you call for help?  Share this information with your partner, family, or a friend. They can help you watch for warning signs.  Call 911  anytime you think you may need emergency care. For example, call if:    You feel you cannot stop from hurting yourself, your baby, or someone else.     You passed out (lost consciousness).     You have chest pain, are short of breath, or cough up blood.     You have a seizure.   Where to get help 24 hours a day, 7 days a week   If you or someone you know talks about suicide, self-harm, a mental health crisis, a substance use crisis, or any other kind of emotional distress, get help right away. You can:    Call the Suicide and Crisis Lifeline at 028.     Call 2-839-743-TALK (1-595.240.3941).     Text HOME to 598468 to access the Crisis Text Line.   Consider saving these numbers in your phone.  Go to [email or web address] for more information or to chat online.  Call your doctor or midwife now or seek immediate medical care if:    You have signs of hemorrhage (too much bleeding), such as:  Heavy vaginal bleeding. This means that you are soaking through one or more pads in an  "hour. Or you pass blood clots bigger than an egg.  Feeling dizzy or lightheaded, or you feel like you may faint.  Feeling so tired or weak that you cannot do your usual activities.  A fast or irregular heartbeat.  New or worse belly pain.     You have signs of infection, such as:  A fever.  Increased pain, swelling, warmth, or redness from an incision or wound.  Frequent or painful urination or blood in your urine.  Vaginal discharge that smells bad.  New or worse belly pain.     You have symptoms of a blood clot in your leg (called a deep vein thrombosis), such as:  Pain in the calf, back of the knee, thigh, or groin.  Swelling in the leg or groin.  A color change on the leg or groin. The skin may be reddish or purplish, depending on your usual skin color.     You have signs of preeclampsia, such as:  Sudden swelling of your face, hands, or feet.  New vision problems (such as dimness, blurring, or seeing spots).  A severe headache.     You have signs of heart failure, such as:  New or increased shortness of breath.  New or worse swelling in your legs, ankles, or feet.  Sudden weight gain, such as more than 2 to 3 pounds in a day or 5 pounds in a week.  Feeling so tired or weak that you cannot do your usual activities.     You had spinal or epidural pain relief and have:  New or worse back pain.  Increased pain, swelling, warmth, or redness at the injection site.  Tingling, weakness, or numbness in your legs or groin.   Watch closely for changes in your health, and be sure to contact your doctor or midwife if:    Your vaginal bleeding isn't decreasing.     You feel sad, anxious, or hopeless for more than a few days.     You are having problems with your breasts or breastfeeding.   Where can you learn more?  Go to https://www.healthwise.net/patiented  Enter A461 in the search box to learn more about \"After Your Delivery (the Postpartum Period): Care Instructions.\"  Current as of: April 30, 2024  Content Version: " 14.4    1694-8155 [employer].   Care instructions adapted under license by your healthcare professional. If you have questions about a medical condition or this instruction, always ask your healthcare professional. [employer] disclaims any warranty or liability for your use of this information.

## 2025-04-06 NOTE — PLAN OF CARE
Goal Outcome Evaluation:      Plan of Care Reviewed With: patient    Overall Patient Progress: improvingOverall Patient Progress: improving     Data: Vital signs stable, assessments within normal limits.   Tolerating regular diet. Voiding, passing gas.   Fundus firm, lochia controlled.   Discharge outcomes on care plan met.   No apparent pain.  Action: Review of care plan, teaching, and discharge instructions done.  Has breast pump at home, declined discharge medication -  has meds at home. Iv removed prior to discharge. WT done. Colostrum given to patient after verifying name and .   Response: patient states understanding of discharge instructions. Home care consult placed. All questions about mom and baby care addressed. Patient discharged at 1200

## 2025-04-06 NOTE — PROVIDER NOTIFICATION
04/06/25 1308   Provider Notification   Provider Name/Title Dr. Vale   Method of Notification Phone   Request Evaluate-Remote   Notification Reason Other  (patient did not get MMR before leaving.)

## 2025-04-06 NOTE — PROGRESS NOTES
Assumed care at 2315. VSS and patient afebrile. Denies preE symptoms. Voiding  spontaneously and ambulating well. Patient reports pain adequately controlled. Fundal checks WNL. Breastfeeding infant independently.  Call light within reach and patient knows to call out to nursing for any questions or concerns. Plan for discharging this morning.

## 2025-04-06 NOTE — PLAN OF CARE
Assumed care at 1915. Patient bonding well with infant, breastfeeding independently. Voiding spontaneously. See MAR for medication administered this shift. Able to make needs known. Hoping for discharge home tomorrow. Report to Carol KRAUSE    Goal Outcome Evaluation:  Ongoing progressing, reviewed with patient

## 2025-04-06 NOTE — PROGRESS NOTES
[facility] Post-Partum Progress Note     Postpartum day # 2    SUBJECTIVE:   No complaints. Happy that she and baby are well.   No further fever since the isolated one just at delivery. Off abx.   Breast feeding is going ok.  Bottom is a little sore but o/w doing really well.   Bleeding is appropriate.          Physical Exam:     Vitals:    04/05/25 1946 04/06/25 0017 04/06/25 0809 04/06/25 0912   BP: 110/74 99/61  103/74   BP Location: Left arm      Patient Position: Semi-Castaneda's Semi-Castaneda's     Cuff Size: Adult Regular Adult Regular  Adult Regular   Pulse:    61   Resp: 17 18  16   Temp: 98  F (36.7  C) 98.4  F (36.9  C)  97.9  F (36.6  C)   TempSrc: Oral Oral  Oral   SpO2:       Weight:   69.7 kg (153 lb 9.6 oz)    Height:          Gen:  NAD,   normal respiratory and cardiovascular effort   ABD:  Soft, NT   Uterine fundus is firm, non-tender and U-1  bilateral LE's: 1+ edema, nontender    Hemoglobin   Date Value Ref Range Status   04/06/2025 11.9 11.7 - 15.7 g/dL Final   04/05/2025 10.0 (L) 11.7 - 15.7 g/dL Final      WBC 24--> 27 --> 18 (today)            Assessment and Plan:    Doing well PPD # 2 s/p VAVD with isolated fever at time of delivery  Patient is afebrile without uterine tenderness.  WBC trending down.   discussed normal expectations for postpartum recovery.   Discussed postpartum instructions/restrictions and follow up.   Patient has ibuprofen, tylenol and miralax at home.  She will pickup senokot today.   Continue prenatal vitamins.   Patient to follow-up in 6 wks but sooner prn any concerns.   Medically Ready for Discharge: Ready Now    Abi Vale MD

## 2025-04-07 ENCOUNTER — MYC MEDICAL ADVICE (OUTPATIENT)
Dept: OBGYN | Facility: CLINIC | Age: 30
End: 2025-04-07
Payer: COMMERCIAL

## 2025-04-07 ENCOUNTER — TELEPHONE (OUTPATIENT)
Dept: OBGYN | Facility: CLINIC | Age: 30
End: 2025-04-07
Payer: COMMERCIAL

## 2025-04-07 NOTE — TELEPHONE ENCOUNTER
PAPERWORK REQUEST    Form received on: 04/06/25   How was form received: Fax  Preferred Provider: Abi Vale MD  Type of form: FMLA  Date needed by: as soon as available  What to do with form once completed: Please fax to [phone]  Where was form placed?: in RR's basket for signature  Has an ZACHERY been signed? Not Applicable    Additional Notes:      Daxa Barrera/her/hers  [facility]

## 2025-04-09 NOTE — TELEPHONE ENCOUNTER
Forms signed by RR, faxed back to [employer] and scanned to HIM.     Daxa  She/her/hers  [facility]

## 2025-04-14 ENCOUNTER — TELEPHONE (OUTPATIENT)
Dept: OPHTHALMOLOGY | Facility: CLINIC | Age: 30
End: 2025-04-14
Payer: COMMERCIAL

## 2025-04-14 NOTE — TELEPHONE ENCOUNTER
Confirmed with Dr. Hudson and ok for pt to bring 10 month old baby to appt.    Spoke to pt and pt aware.    Alpesh Blue RN 1:26 PM 04/14/25

## 2025-04-14 NOTE — TELEPHONE ENCOUNTER
Health Call Center    Phone Message    May a detailed message be left on voicemail: yes     Reason for Call:  Patient called inquiring if she is able to bring her 10 day old infant to the eye appointment on 4/24/25 with Dr. Ann, as she is nursing the baby. Please call patient back. Thank you.    Action Taken: Other: [facility]    Travel Screening: Not Applicable     Date of Service:

## 2025-04-24 ENCOUNTER — OFFICE VISIT (OUTPATIENT)
Dept: OPHTHALMOLOGY | Facility: CLINIC | Age: 30
End: 2025-04-24
Payer: COMMERCIAL

## 2025-04-24 DIAGNOSIS — H52.10 MYOPIA WITH REGULAR ASTIGMATISM: Primary | ICD-10-CM

## 2025-04-24 DIAGNOSIS — H52.229 MYOPIA WITH REGULAR ASTIGMATISM: Primary | ICD-10-CM

## 2025-04-24 ASSESSMENT — REFRACTION_MANIFEST
OS_AXIS: 075
OD_AXIS: 100
OD_CYLINDER: +0.50
OS_SPHERE: -2.50
OD_SPHERE: -2.25
OS_CYLINDER: +1.75

## 2025-04-24 ASSESSMENT — REFRACTION_CURRENTRX
OD_SPHERE: -2.00
OS_AXIS: 170
OD_BRAND: ALCON AIR OPTIX AQUA BC 8.6, D 14.2
OS_CYLINDER: -1.25
OS_BRAND: ALCON AIR OPTIX FOR ASTIGMATISM BC 8.7, D 14.5
OS_SPHERE: -1.00

## 2025-04-24 ASSESSMENT — CONF VISUAL FIELD
METHOD: COUNTING FINGERS
OD_NORMAL: 1
OS_NORMAL: 1
OD_SUPERIOR_TEMPORAL_RESTRICTION: 0
OS_INFERIOR_TEMPORAL_RESTRICTION: 0
OS_SUPERIOR_NASAL_RESTRICTION: 0
OD_SUPERIOR_NASAL_RESTRICTION: 0
OD_INFERIOR_TEMPORAL_RESTRICTION: 0
OD_INFERIOR_NASAL_RESTRICTION: 0
OS_SUPERIOR_TEMPORAL_RESTRICTION: 0
OS_INFERIOR_NASAL_RESTRICTION: 0

## 2025-04-24 ASSESSMENT — EXTERNAL EXAM - RIGHT EYE: OD_EXAM: NORMAL

## 2025-04-24 ASSESSMENT — VISUAL ACUITY
OD_CC: 20/25
OS_CC: 20/20
OD_CC+: +2
METHOD: SNELLEN - LINEAR
OS_CC+: -1
CORRECTION_TYPE: CONTACTS

## 2025-04-24 ASSESSMENT — SLIT LAMP EXAM - LIDS
COMMENTS: NORMAL
COMMENTS: NORMAL

## 2025-04-24 ASSESSMENT — EXTERNAL EXAM - LEFT EYE: OS_EXAM: NORMAL

## 2025-04-24 ASSESSMENT — TONOMETRY
OS_IOP_MMHG: 16
IOP_METHOD: ICARE
OD_IOP_MMHG: 16

## 2025-04-24 ASSESSMENT — CUP TO DISC RATIO
OS_RATIO: 0.35
OD_RATIO: 0.35

## 2025-04-24 NOTE — PROGRESS NOTES
A/P  1.) Myopia/Astigmatism OU  -Stable Rx, seeing well  -Currently in Air Optix, tear often and would like to consider other materials  -Trial with BioFixetude material - can MyChart if not working well  -Dilated ocular health unremarkable OU    Monitor 1-2 years comprehensive, sooner prn    I have confirmed the patient's CC, HPI and reviewed Past Medical History, Past Surgical History, Social History, Family History, Problem List, Medication List and agree with Tech note.     Brandi Hudson, OD FAAO FSLS

## 2025-04-24 NOTE — NURSING NOTE
Chief Complaints and History of Present Illnesses   Patient presents with    COMPREHENSIVE EYE EXAM     New Pt here for CEE.     Chief Complaint(s) and History of Present Illness(es)       COMPREHENSIVE EYE EXAM              Laterality: both eyes    Associated symptoms: floaters.  Negative for dryness, eye pain, tearing, photophobia and flashes    Treatments tried: no treatments    Comments: New Pt here for CEE.              Comments    MIKIE was a year ago.  Pt states vision is the same.  No pain.  Pt used Air Optix and liked them.  She is currently using [employer] Optifree Puremoist.  She feels they are a little to soft and they seem to rip before the end of the month and hard to get out.  No flashes.  No change to floater RE.  No ocular meds.  No DM.  Pt states [facility] had been watching something in the back of her RE (?) prior to coming here 3 years ago.    DUSTY Mason April 24, 2025 12:39 PM

## 2025-05-03 ENCOUNTER — HEALTH MAINTENANCE LETTER (OUTPATIENT)
Age: 30
End: 2025-05-03

## 2025-05-20 ENCOUNTER — PRENATAL OFFICE VISIT (OUTPATIENT)
Dept: OBGYN | Facility: CLINIC | Age: 30
End: 2025-05-20
Payer: COMMERCIAL

## 2025-05-20 VITALS
SYSTOLIC BLOOD PRESSURE: 116 MMHG | DIASTOLIC BLOOD PRESSURE: 67 MMHG | BODY MASS INDEX: 23.93 KG/M2 | OXYGEN SATURATION: 99 % | HEART RATE: 76 BPM | WEIGHT: 143.6 LBS | TEMPERATURE: 97.1 F | HEIGHT: 65 IN

## 2025-05-20 DIAGNOSIS — Z12.4 PAP SMEAR FOR CERVICAL CANCER SCREENING: ICD-10-CM

## 2025-05-20 DIAGNOSIS — N81.89 PELVIC FLOOR WEAKNESS IN FEMALE: ICD-10-CM

## 2025-05-20 DIAGNOSIS — Z23 NEED FOR MMR VACCINE: ICD-10-CM

## 2025-05-20 PROBLEM — Z34.90 PREGNANCY: Status: RESOLVED | Noted: 2025-04-03 | Resolved: 2025-05-20

## 2025-05-20 ASSESSMENT — PATIENT HEALTH QUESTIONNAIRE - PHQ9
SUM OF ALL RESPONSES TO PHQ QUESTIONS 1-9: 1
10. IF YOU CHECKED OFF ANY PROBLEMS, HOW DIFFICULT HAVE THESE PROBLEMS MADE IT FOR YOU TO DO YOUR WORK, TAKE CARE OF THINGS AT HOME, OR GET ALONG WITH OTHER PEOPLE: SOMEWHAT DIFFICULT
SUM OF ALL RESPONSES TO PHQ QUESTIONS 1-9: 1

## 2025-05-20 NOTE — PROGRESS NOTES
Melody is here for a 6-week postpartum checkup.    She had a vaginal delivery with vacuum assist of a viable girl, weight 6 pounds 9 oz., with no complications.  Since delivery, she has been breast feeding.  She has no signs of infection, bleeding or other complications.  She is not pregnant.  We discussed contraceptions and she has chosen condoms. She denies feeling sad, depressed or too overwhelmed.      5/20/2025     2:21 PM   PHQ   PHQ-9 Total Score 1    Q9: Thoughts of better off dead/self-harm past 2 weeks Not at all       Patient-reported     She does feel like she can still feel some weakness and soreness of her pelvic floor.    EXAM:    HEENT: grossly normal.  NECK: no lymphadenopathy or thyroidomegaly.  LUNGS: CTA X 2, no rales or crackles.  BACK: No spinal or CVA tenderness.  HEART: RRR without murmurs clicks or gallops.  ABDOMEN: soft, non tender, good bowel sounds, without masses rebound, guarding or tenderness.    PELVIC:    External genitalia: normal without lesion, repair well healed.                            Vagina: normal mucosa and rugae, no discharge.  Cervix: multiparous, well healed, without lesion.  Uterus: non pregnant in size, firm , mobile, no lesions.  Adnexa: non tender, without masses    EXTREMITIES: Warm to touch, good pulses, no ankle edema or calf tenderness.  NEUROLOGIC: grossly normal.    ASSESSMENT:   Normal 6-week postpartum exam after vaginal delivery with vacuum assist. Rubella NI.  Pelvic floor weakness and soreness    PLAN:  Pap smear done and condoms for contraception.  MMR today. Pelvic floor PT referral. RTC yearly or prn.    MEGA ZAPATA MD

## 2025-05-21 LAB
HPV HR 12 DNA CVX QL NAA+PROBE: NEGATIVE
HPV16 DNA CVX QL NAA+PROBE: NEGATIVE
HPV18 DNA CVX QL NAA+PROBE: NEGATIVE
HUMAN PAPILLOMA VIRUS FINAL DIAGNOSIS: NORMAL

## 2025-05-22 ENCOUNTER — RESULTS FOLLOW-UP (OUTPATIENT)
Dept: OBGYN | Facility: CLINIC | Age: 30
End: 2025-05-22

## 2025-06-26 ENCOUNTER — PATIENT OUTREACH (OUTPATIENT)
Dept: CARE COORDINATION | Facility: CLINIC | Age: 30
End: 2025-06-26
Payer: COMMERCIAL

## 2025-07-01 ENCOUNTER — THERAPY VISIT (OUTPATIENT)
Age: 30
End: 2025-07-01
Attending: OBSTETRICS & GYNECOLOGY
Payer: COMMERCIAL

## 2025-07-01 DIAGNOSIS — N81.89 PELVIC FLOOR WEAKNESS IN FEMALE: ICD-10-CM

## 2025-07-01 DIAGNOSIS — R10.2 PELVIC PAIN IN FEMALE: Primary | ICD-10-CM

## 2025-07-01 PROCEDURE — 97530 THERAPEUTIC ACTIVITIES: CPT | Mod: GP

## 2025-07-01 PROCEDURE — 97161 PT EVAL LOW COMPLEX 20 MIN: CPT | Mod: GP

## 2025-07-01 PROCEDURE — 97140 MANUAL THERAPY 1/> REGIONS: CPT | Mod: GP

## 2025-07-01 PROCEDURE — 97110 THERAPEUTIC EXERCISES: CPT | Mod: GP

## 2025-07-01 NOTE — PROGRESS NOTES
PHYSICAL THERAPY EVALUATION  Type of Visit: Evaluation       Fall Risk Screen:  Have you fallen 2 or more times in the past year?: No  Have you fallen and had an injury in the past year?: No    Subjective   Patient reports pelvic pressure especially when toilet or when legs are abducted. Delivery was vacuum assisted, pushed for 1hr 15mins, vacuum assisted due to baby heart rate, grade 2 perineal tear. New onset of pain with deep penetration. Denies pelvic heaviness with activities.         Presenting condition or subjective complaint: continued pressure at times in pelvic region when on toilet  Date of onset: 05/20/25 (date of order. Delivery date 4/4/25)    Relevant medical history: Pregnant or breastfeeding, celiac disease   Dates & types of surgery: NA    Prior diagnostic imaging/testing results:         Prior therapy history for the same diagnosis, illness or injury: No        Living Environment  Social support: With family members   Type of home: House   Stairs to enter the home: Yes 3 Is there a railing: Yes     Ramp: No   Stairs inside the home: Yes 15 Is there a railing: Yes     Help at home: None  Equipment owned:       Employment: Yes registered nurse  Hobbies/Interests: mallory, walking/hiking, travel    Patient goals for therapy: NA. Resolve pressure, improve pelvic floor function       Objective      PELVIC EVALUATION  ADDITIONAL HISTORY:  Sex assigned at birth: Female  Gender identity: Female    Pronouns: She/Her Hers      Bladder History:  Feels bladder filling: No, reports normal urges and frequency  Triggers for feeling of inability to wait to go to the bathroom: No    How long can you wait to urinate: depends, an hour maybe  Gets up at night to urinate: Yes multiple times with breastfeeding  Can stop the flow of urine when urinating: Yes  Volume of urine usually released: Medium   Other issues:    Number of bladder infections in last 12 months:  0  Fluid intake per day: 2000 200 90  Medications  taken for bladder: No     Activities causing urine leak:  No leakage. Minor leakage during pregnancy.     Amount of urine typically leaked: NA  Pads used to help with leaking: No        Bowel History:  Frequency of bowel movement: 1-2 times per day  Consistency of stool: Soft    Ignores the urge to defecate: Sometimes  Other bowel issues:  minor bowel urgency and looser stools occurring every 1-2 weeks  Length of time spent trying to have a bowel movement:  <10 minutes     Sexual Function History:  Sexual orientation: Straight    Sexually active: Yes  Lubrication used: Yes Yes   Pelvic pain: Sitting; Deep penetration (rectal or vaginal)    Pain or difficulty with orgasms/erection/ejaculation: No    State of menopause: Perimenopause (have not gone through menopause yet)  Hormone medications: No      Are you currently pregnant: No  Number of previous pregnancies: 1  Number of deliveries: 1  If you have delivered before, did you have any of these issues during delivery: Tearing; Vaginal delivery  Have you been diagnosed with pelvic prolapse or abdominal separation: No  Do you get regular exercise: Yes  I do this type of exercise: walking and running  Have you tried pelvic floor strengthening exercises for 4 weeks: No  Do you have any history of trauma that is relevant to your care that you d like to share: No        Discussed reason for referral regarding pelvic health needs and external/internal pelvic floor muscle examination with patient/guardian.  Opportunity provided to ask questions and verbal consent for assessment and intervention was given.      POSTURE: Sitting Posture: WNL    HIP SCREEN: WNL  Functional Strength Testing: Double Leg Squat: lateral shift to R    PELVIC/SI SCREEN: negative pelvic compression and distraction       PELVIC EXAM  External Visual Inspection:  At rest: Normal  With voluntary pelvic floor contraction: Perineal elevation  Relaxation of PFM: Yes  With intra-abdominal pressure: Cough:  Perineal descent  Bearing down as defecation: Perineal descent    Integumentary:   Introitus: Unremarkable    External Digital Palpation per Perineum:   Ischiocavernosis: Unremarkable  Bulbo cavernosis: Tenderness, R  Transverse perineal: Tightness, Tenderness, B  Levator ani: Tightness, Tenderness, B  Perineal body: Tightness    Scar:   Location/Type: perineal   Mobility: Hypomobile    Internal Digital Palpation:  Per Vagina:  Tenderness  Myofascial Resistance to Palpation: Soft, Taut  Digital Muscle Performance: P (Power): 2/5  E (Endurance): 5 sec  F (Fast Twitch): 4 repetitions  Compensations: Abdominals, Breath holding  Relaxation Post-Contraction: Normal      Pelvic Organ Prolapse: No visual descent of tissues       ABDOMINAL ASSESSMENT  Diastasis Rectus Abdominis (SHAHRAM):  SHAHRAM presence: No  Location   Above Umbilicus Depth/Finger width: 1 finger width   At Umbilicus Depth/Finger width: 1 finger width   Below Umbilicus Depth/Finger width: <1  Visual doming above umbilicus with rectus activation    Abdominal Activation/Strength: SLR: 0/5 B, no pelvic rotation      Assessment & Plan   CLINICAL IMPRESSIONS  Medical Diagnosis: Pelvic floor weakness in female    Treatment Diagnosis: Pelvic floor weakness in female, pelvic pain   Impression/Assessment: Patient is a 29 year old female with pelvic pressure, pelvic pain complaints.  The following significant findings have been identified: Pain, Decreased strength, Impaired muscle performance, and Decreased activity tolerance. These impairments interfere with their ability to perform self care tasks and recreational activities as compared to previous level of function.     Clinical Decision Making (Complexity):  Clinical Presentation: Stable/Uncomplicated  Clinical Presentation Rationale: based on medical and personal factors listed in PT evaluation  Clinical Decision Making (Complexity): Low complexity    PLAN OF CARE  Treatment Interventions:  Modalities: Biofeedback,  Ultrasound  Interventions: Manual Therapy, Neuromuscular Re-education, Therapeutic Activity, Therapeutic Exercise, Self-Care/Home Management    Long Term Goals     PT Goal 1  Goal Description: Patient will report <1/10 pelvic pain with deep vaginal palpation and insertion to return to PLOF  Rationale: to maximize safety and independence with performance of ADLs and functional tasks;to maximize safety and independence with self cares  Target Date: 09/22/25  PT Goal 2  Goal Description: Patient will perform abdominal exercise such as a crunch or plank without abdominal bulging to indicate improved abdominal function and coordination for trunk support  Rationale: to maximize safety and independence with performance of ADLs and functional tasks  Target Date: 09/22/25      Frequency of Treatment: 1x per week reducing to every 2-3 weeks  Duration of Treatment: 12 weeks    Recommended Referrals to Other Professionals:   Education Assessment:   Learner/Method: Patient;No Barriers to Learning    Risks and benefits of evaluation/treatment have been explained.   Patient/Family/caregiver agrees with Plan of Care.     Evaluation Time:     PT Eval, Low Complexity Minutes (37892): 35       Signing Clinician: Emma Colby PT

## 2025-07-08 ENCOUNTER — THERAPY VISIT (OUTPATIENT)
Age: 30
End: 2025-07-08
Attending: OBSTETRICS & GYNECOLOGY
Payer: COMMERCIAL

## 2025-07-08 DIAGNOSIS — N81.89 PELVIC FLOOR WEAKNESS IN FEMALE: Primary | ICD-10-CM

## 2025-07-08 DIAGNOSIS — R10.2 PELVIC PAIN IN FEMALE: ICD-10-CM

## 2025-07-08 PROCEDURE — 97110 THERAPEUTIC EXERCISES: CPT | Mod: GP

## 2025-07-08 PROCEDURE — 97530 THERAPEUTIC ACTIVITIES: CPT | Mod: GP

## 2025-07-08 PROCEDURE — 97140 MANUAL THERAPY 1/> REGIONS: CPT | Mod: GP

## 2025-07-15 ENCOUNTER — THERAPY VISIT (OUTPATIENT)
Age: 30
End: 2025-07-15
Attending: OBSTETRICS & GYNECOLOGY
Payer: COMMERCIAL

## 2025-07-15 DIAGNOSIS — R10.2 PELVIC PAIN IN FEMALE: ICD-10-CM

## 2025-07-15 DIAGNOSIS — N81.89 PELVIC FLOOR WEAKNESS IN FEMALE: Primary | ICD-10-CM

## 2025-07-15 PROCEDURE — 97140 MANUAL THERAPY 1/> REGIONS: CPT | Mod: GP

## 2025-07-15 PROCEDURE — 97110 THERAPEUTIC EXERCISES: CPT | Mod: GP

## 2025-07-30 LAB
PATH REPORT.COMMENTS IMP SPEC: NORMAL
PATH REPORT.COMMENTS IMP SPEC: NORMAL
PATH REPORT.FINAL DX SPEC: NORMAL
PATH REPORT.GROSS SPEC: NORMAL
PATH REPORT.MICROSCOPIC SPEC OTHER STN: NORMAL
PATH REPORT.RELEVANT HX SPEC: NORMAL
PHOTO IMAGE: NORMAL

## 2025-08-10 ENCOUNTER — HOSPITAL ENCOUNTER (OUTPATIENT)
Dept: MRI IMAGING | Facility: CLINIC | Age: 30
Discharge: HOME | End: 2025-08-10
Payer: COMMERCIAL

## 2025-08-10 DIAGNOSIS — N89.9 NODULE OF VAGINA: ICD-10-CM

## 2025-08-10 PROCEDURE — 255N000002 HC RX 255 OP 636

## 2025-08-10 PROCEDURE — 72197 MRI PELVIS W/O & W/DYE: CPT

## 2025-08-10 PROCEDURE — A9585 GADOBUTROL INJECTION: HCPCS

## 2025-08-10 RX ORDER — GADOBUTROL 604.72 MG/ML
6 INJECTION INTRAVENOUS ONCE
Status: COMPLETED | OUTPATIENT
Start: 2025-08-10 | End: 2025-08-10

## 2025-08-10 RX ADMIN — GADOBUTROL 6 ML: 604.72 INJECTION INTRAVENOUS at 16:13

## 2025-08-12 ENCOUNTER — THERAPY VISIT (OUTPATIENT)
Age: 30
End: 2025-08-12
Payer: COMMERCIAL

## 2025-08-12 DIAGNOSIS — N81.89 PELVIC FLOOR WEAKNESS IN FEMALE: Primary | ICD-10-CM

## 2025-08-12 DIAGNOSIS — R10.2 PELVIC PAIN IN FEMALE: ICD-10-CM

## 2025-08-12 PROCEDURE — 97530 THERAPEUTIC ACTIVITIES: CPT | Mod: GP

## 2025-08-12 PROCEDURE — 97110 THERAPEUTIC EXERCISES: CPT | Mod: GP

## (undated) RX ORDER — BUPIVACAINE HYDROCHLORIDE 5 MG/ML
INJECTION, SOLUTION EPIDURAL; INTRACAUDAL; PERINEURAL
Status: DISPENSED
Start: 2025-04-03